# Patient Record
Sex: FEMALE | Race: WHITE | NOT HISPANIC OR LATINO | Employment: FULL TIME | URBAN - METROPOLITAN AREA
[De-identification: names, ages, dates, MRNs, and addresses within clinical notes are randomized per-mention and may not be internally consistent; named-entity substitution may affect disease eponyms.]

---

## 2017-04-17 ENCOUNTER — OFFICE VISIT (OUTPATIENT)
Dept: URGENT CARE | Facility: URGENT CARE | Age: 32
End: 2017-04-17
Payer: COMMERCIAL

## 2017-04-17 VITALS
HEART RATE: 85 BPM | SYSTOLIC BLOOD PRESSURE: 116 MMHG | OXYGEN SATURATION: 99 % | DIASTOLIC BLOOD PRESSURE: 76 MMHG | BODY MASS INDEX: 21.11 KG/M2 | TEMPERATURE: 98.8 F | WEIGHT: 123 LBS

## 2017-04-17 DIAGNOSIS — J45.20 MILD INTERMITTENT ASTHMA WITHOUT COMPLICATION: Primary | ICD-10-CM

## 2017-04-17 DIAGNOSIS — J45.20 MILD INTERMITTENT ASTHMA, UNCOMPLICATED: ICD-10-CM

## 2017-04-17 DIAGNOSIS — J06.9 VIRAL URI WITH COUGH: ICD-10-CM

## 2017-04-17 PROCEDURE — 99213 OFFICE O/P EST LOW 20 MIN: CPT | Performed by: FAMILY MEDICINE

## 2017-04-17 RX ORDER — ALBUTEROL SULFATE 0.83 MG/ML
1 SOLUTION RESPIRATORY (INHALATION) EVERY 6 HOURS PRN
Qty: 50 VIAL | Refills: 5 | Status: SHIPPED | OUTPATIENT
Start: 2017-04-17 | End: 2019-12-02

## 2017-04-17 RX ORDER — BUDESONIDE 0.5 MG/2ML
0.5 INHALANT ORAL DAILY
Qty: 1 BOX | Refills: 5 | Status: ON HOLD | OUTPATIENT
Start: 2017-04-17 | End: 2019-04-10

## 2017-04-17 RX ORDER — ALBUTEROL SULFATE 90 UG/1
2-4 AEROSOL, METERED RESPIRATORY (INHALATION) EVERY 4 HOURS PRN
Qty: 1 INHALER | Refills: 2 | Status: SHIPPED | OUTPATIENT
Start: 2017-04-17 | End: 2018-11-07

## 2017-04-17 NOTE — MR AVS SNAPSHOT
After Visit Summary   4/17/2017    Cuca Jung    MRN: [id number]           Patient Information     Date Of Birth          1985        Visit Information        Provider Department      4/17/2017 5:00 PM Giovanni Mir MD [facility]        Today's Diagnoses     Mild intermittent asthma without complication    -  1    Mild intermittent asthma, uncomplicated           Follow-ups after your visit        Who to contact     If you have questions or need follow up information about today's clinic visit or your schedule please contact [facility] directly at [phone].  Normal or non-critical lab and imaging results will be communicated to you by Survioshart, letter or phone within 4 business days after the clinic has received the results. If you do not hear from us within 7 days, please contact the clinic through Survios[employer] or phone. If you have a critical or abnormal lab result, we will notify you by phone as soon as possible.  Submit refill requests through [employer] or call your pharmacy and they will forward the refill request to us. Please allow 3 business days for your refill to be completed.          Additional Information About Your Visit        MyChart Information     [employer] gives you secure access to your electronic health record. If you see a primary care provider, you can also send messages to your care team and make appointments. If you have questions, please call your primary care clinic.  If you do not have a primary care provider, please call [phone] and they will assist you.        Care EveryWhere ID     This is your Care EveryWhere ID. This could be used by other organizations to access your [facility] medical records  [id number]        Your Vitals Were     Pulse Temperature Pulse Oximetry BMI (Body Mass Index)          85 98.8  F (37.1  C) (Oral) 99% 21.11 kg/m2         Blood Pressure from Last 3 Encounters:   04/17/17 116/76   09/27/16  118/71   08/11/16 111/68    Weight from Last 3 Encounters:   04/17/17 123 lb (55.8 kg)   09/27/16 121 lb (54.9 kg)   08/11/16 118 lb 12.8 oz (53.9 kg)              Today, you had the following     No orders found for display         Today's Medication Changes          These changes are accurate as of: 4/17/17  5:52 PM.  If you have any questions, ask your nurse or doctor.               These medicines have changed or have updated prescriptions.        Dose/Directions    * albuterol 108 (90 BASE) MCG/ACT Inhaler   Commonly known as:  PROAIR HFA/PROVENTIL HFA/VENTOLIN HFA   This may have changed:    - how much to take  - when to take this  - reasons to take this   Used for:  Mild intermittent asthma without complication   Changed by:  Giovanni Mir MD        Dose:  2-4 puff   Inhale 2-4 puffs into the lungs every 4 hours as needed for shortness of breath / dyspnea or wheezing Reported on 4/17/2017   Quantity:  1 Inhaler   Refills:  2       * albuterol (2.5 MG/3ML) 0.083% neb solution   This may have changed:  Another medication with the same name was changed. Make sure you understand how and when to take each.   Used for:  Mild intermittent asthma, uncomplicated   Changed by:  Giovanni Mir MD        Dose:  1 vial   Take 1 vial (2.5 mg) by nebulization every 6 hours as needed for shortness of breath / dyspnea or wheezing   Quantity:  50 vial   Refills:  5       * Notice:  This list has 2 medication(s) that are the same as other medications prescribed for you. Read the directions carefully, and ask your doctor or other care provider to review them with you.         Where to get your medicines      These medications were sent to [facility] [address] [place] [facility] [place] - [address] [facility]  [address] [facility] [address]     Phone:  [phone]     albuterol (2.5 MG/3ML) 0.083% neb solution    albuterol 108 (90 BASE) MCG/ACT Inhaler    budesonide 0.5 MG/2ML neb solution                 Primary Care Provider    None Specified       No primary provider on file.        Thank you!     Thank you for choosing [facility]  for your care. Our goal is always to provide you with excellent care. Hearing back from our patients is one way we can continue to improve our services. Please take a few minutes to complete the written survey that you may receive in the mail after your visit with us. Thank you!             Your Updated Medication List - Protect others around you: Learn how to safely use, store and throw away your medicines at www.disposemymeds.org.          This list is accurate as of: 4/17/17  5:52 PM.  Always use your most recent med list.                   Brand Name Dispense Instructions for use    * albuterol 108 (90 BASE) MCG/ACT Inhaler    PROAIR HFA/PROVENTIL HFA/VENTOLIN HFA    1 Inhaler    Inhale 2-4 puffs into the lungs every 4 hours as needed for shortness of breath / dyspnea or wheezing Reported on 4/17/2017       * albuterol (2.5 MG/3ML) 0.083% neb solution     50 vial    Take 1 vial (2.5 mg) by nebulization every 6 hours as needed for shortness of breath / dyspnea or wheezing       budesonide 0.5 MG/2ML neb solution    PULMICORT    1 Box    Take 2 mLs (0.5 mg) by nebulization daily       CALCIUM + D PO          drospirenone-ethinyl estradiol 3-0.02 MG per tablet    CAROLA    112 tablet    Take 1 tablet by mouth daily Active tablets only for prevention of menses       meclizine 25 MG tablet    ANTIVERT    30 tablet    Take 1 tablet (25 mg) by mouth every 6 hours as needed for dizziness       MELATONIN PO          Multi-vitamin Tabs tablet      Take 1 tablet by mouth daily       * Notice:  This list has 2 medication(s) that are the same as other medications prescribed for you. Read the directions carefully, and ask your doctor or other care provider to review them with you.

## 2017-04-17 NOTE — PROGRESS NOTES
Some of this note was populated by a medical assistant.    SUBJECTIVE:                                                    Cuca Jung is a 31 year old female who presents to clinic today for the following health issues:      RESPIRATORY SYMPTOMS      Duration: 3 days sore throat with strep exposure.     Description  nasal congestion, rhinorrhea, sore throat, facial pain/pressure, cough dry, wheezing, chills, fatigue/malaise, hoarse voice and myalgias    Severity: moderate    Accompanying signs and symptoms: None    History (predisposing factors):  strep exposure and asthma    Precipitating or alleviating factors: None    Therapies tried and outcome:  rest and fluids OTC NSAID     Has been using prn pulmicort and albuterol nebs or inhaler less than once per week on avg.     Problem list and histories reviewed & adjusted, as indicated.  Additional history: as documented    Patient Active Problem List   Diagnosis     Mild intermittent asthma, uncomplicated     Family history of malignant neoplasm of breast     Past Surgical History:   Procedure Laterality Date     PE TUBES  age 2     TONSILLECTOMY  age 23       Social History   Substance Use Topics     Smoking status: Never Smoker     Smokeless tobacco: Never Used     Alcohol use 0.0 oz/week     0 Standard drinks or equivalent per week     Family History   Problem Relation Age of Onset     DIABETES No family hx of      Glaucoma No family hx of      Macular Degeneration No family hx of      Hypertension Father      Breast Cancer Mother 40     Hypertension Mother      CEREBROVASCULAR DISEASE Mother      Thyroid Disease Maternal Uncle      Hypertension Paternal Grandmother      Hypertension Paternal Grandfather          Current Outpatient Prescriptions   Medication Sig Dispense Refill     Calcium Citrate-Vitamin D (CALCIUM + D PO)        MELATONIN PO        budesonide (PULMICORT) 0.5 MG/2ML nebulizer solution Take 2 mLs (0.5 mg) by nebulization daily 1 Box 11      albuterol (2.5 MG/3ML) 0.083% nebulizer solution Take 1 vial (2.5 mg) by nebulization every 6 hours as needed for shortness of breath / dyspnea or wheezing 50 vial 11     drospirenone-ethinyl estradiol (CAROLA) 3-0.02 MG per tablet Take 1 tablet by mouth daily Active tablets only for prevention of menses 112 tablet 3     multivitamin, therapeutic with minerals (MULTI-VITAMIN) TABS Take 1 tablet by mouth daily       meclizine (ANTIVERT) 25 MG tablet Take 1 tablet (25 mg) by mouth every 6 hours as needed for dizziness (Patient not taking: Reported on 4/17/2017) 30 tablet 1     albuterol (PROAIR HFA, PROVENTIL HFA, VENTOLIN HFA) 108 (90 BASE) MCG/ACT inhaler Inhale 2 puffs into the lungs every 6 hours Reported on 4/17/2017       Allergies   Allergen Reactions     Seasonal Allergies      Nickel Rash       Reviewed and updated as needed this visit by clinical staff  Tobacco  Allergies  Meds       Reviewed and updated as needed this visit by Provider         ROS:  Constitutional, HEENT, cardiovascular, pulmonary, gi and gu systems are negative, except as otherwise noted.    OBJECTIVE:                                                    /76 (BP Location: Left arm, Patient Position: Chair, Cuff Size: Adult Regular)  Pulse 85  Temp 98.8  F (37.1  C) (Oral)  Wt 123 lb (55.8 kg)  SpO2 99%  BMI 21.11 kg/m2  Body mass index is 21.11 kg/(m^2).  GENERAL: healthy, alert and no distress  NECK: no adenopathy, no asymmetry, masses, or scars and thyroid normal to palpation  RESP: noted mild expiratory wheeze with slightly decrease in air exchange.   CV: regular rate and rhythm, normal S1 S2, no S3 or S4, no murmur, click or rub, no peripheral edema and peripheral pulses strong  ABDOMEN: soft, nontender, no hepatosplenomegaly, no masses and bowel sounds normal  MS: no gross musculoskeletal defects noted, no edema          ASSESSMENT/PLAN:                                                        ICD-10-CM    1. Mild intermittent  asthma without complication J45.20 albuterol (PROAIR HFA/PROVENTIL HFA/VENTOLIN HFA) 108 (90 BASE) MCG/ACT Inhaler   2. Mild intermittent asthma, uncomplicated J45.20 albuterol (2.5 MG/3ML) 0.083% neb solution     budesonide (PULMICORT) 0.5 MG/2ML neb solution   3. Viral URI with cough J06.9     B97.89      Viral URI making her well controlled asthma worse.     PLAN  Patient educational/instructional material provided including reasons for follow-up   The patient indicates understanding of these issues and agrees with the plan.  Giovanni Mir MD    [facility]

## 2017-07-06 DIAGNOSIS — Z01.419 ENCOUNTER FOR GYNECOLOGICAL EXAMINATION WITHOUT ABNORMAL FINDING: ICD-10-CM

## 2017-07-06 RX ORDER — DROSPIRENONE AND ETHINYL ESTRADIOL 0.02-3(28)
1 KIT ORAL DAILY
Qty: 112 TABLET | Refills: 0 | Status: SHIPPED | OUTPATIENT
Start: 2017-07-06 | End: 2017-08-25

## 2017-07-06 NOTE — TELEPHONE ENCOUNTER
Pharmacy sent a refill request for OCPs. Pt is due for AE. TC to pt to get her scheduled. Scheduled with BE on 08/25/2017. Refill sent per protocol. Michaela Chaudhary RN

## 2017-08-25 ENCOUNTER — OFFICE VISIT (OUTPATIENT)
Dept: FAMILY MEDICINE | Facility: CLINIC | Age: 32
End: 2017-08-25
Payer: COMMERCIAL

## 2017-08-25 ENCOUNTER — RESULT FOLLOW UP (OUTPATIENT)
Dept: OBGYN | Facility: CLINIC | Age: 32
End: 2017-08-25

## 2017-08-25 ENCOUNTER — OFFICE VISIT (OUTPATIENT)
Dept: OBGYN | Facility: CLINIC | Age: 32
End: 2017-08-25
Payer: COMMERCIAL

## 2017-08-25 VITALS
BODY MASS INDEX: 21.51 KG/M2 | HEIGHT: 64 IN | SYSTOLIC BLOOD PRESSURE: 104 MMHG | DIASTOLIC BLOOD PRESSURE: 72 MMHG | HEART RATE: 77 BPM | WEIGHT: 126 LBS | OXYGEN SATURATION: 99 %

## 2017-08-25 VITALS
WEIGHT: 125 LBS | SYSTOLIC BLOOD PRESSURE: 101 MMHG | OXYGEN SATURATION: 100 % | HEART RATE: 69 BPM | DIASTOLIC BLOOD PRESSURE: 69 MMHG | TEMPERATURE: 98.5 F | BODY MASS INDEX: 21.46 KG/M2

## 2017-08-25 DIAGNOSIS — R20.2 PARESTHESIA: Primary | ICD-10-CM

## 2017-08-25 DIAGNOSIS — Z80.3 FAMILY HISTORY OF MALIGNANT NEOPLASM OF BREAST: ICD-10-CM

## 2017-08-25 DIAGNOSIS — Z01.419 ENCOUNTER FOR GYNECOLOGICAL EXAMINATION WITHOUT ABNORMAL FINDING: Primary | ICD-10-CM

## 2017-08-25 DIAGNOSIS — Z13.220 SCREENING FOR LIPOID DISORDERS: ICD-10-CM

## 2017-08-25 DIAGNOSIS — N87.0 DYSPLASIA OF CERVIX, LOW GRADE (CIN 1): ICD-10-CM

## 2017-08-25 LAB
BASOPHILS # BLD AUTO: 0 10E9/L (ref 0–0.2)
BASOPHILS NFR BLD AUTO: 0.1 %
DIFFERENTIAL METHOD BLD: NORMAL
EOSINOPHIL # BLD AUTO: 0.1 10E9/L (ref 0–0.7)
EOSINOPHIL NFR BLD AUTO: 1.4 %
ERYTHROCYTE [DISTWIDTH] IN BLOOD BY AUTOMATED COUNT: 12.3 % (ref 10–15)
FOLATE SERPL-MCNC: 64.5 NG/ML
HCT VFR BLD AUTO: 36.7 % (ref 35–47)
HGB BLD-MCNC: 12.2 G/DL (ref 11.7–15.7)
LYMPHOCYTES # BLD AUTO: 1.8 10E9/L (ref 0.8–5.3)
LYMPHOCYTES NFR BLD AUTO: 21.2 %
MCH RBC QN AUTO: 29 PG (ref 26.5–33)
MCHC RBC AUTO-ENTMCNC: 33.2 G/DL (ref 31.5–36.5)
MCV RBC AUTO: 87 FL (ref 78–100)
MONOCYTES # BLD AUTO: 0.8 10E9/L (ref 0–1.3)
MONOCYTES NFR BLD AUTO: 9.2 %
NEUTROPHILS # BLD AUTO: 5.7 10E9/L (ref 1.6–8.3)
NEUTROPHILS NFR BLD AUTO: 68.1 %
PLATELET # BLD AUTO: 270 10E9/L (ref 150–450)
RBC # BLD AUTO: 4.2 10E12/L (ref 3.8–5.2)
VIT B12 SERPL-MCNC: 380 PG/ML (ref 193–986)
WBC # BLD AUTO: 8.3 10E9/L (ref 4–11)

## 2017-08-25 PROCEDURE — 80053 COMPREHEN METABOLIC PANEL: CPT | Performed by: PHYSICIAN ASSISTANT

## 2017-08-25 PROCEDURE — 99395 PREV VISIT EST AGE 18-39: CPT | Performed by: OBSTETRICS & GYNECOLOGY

## 2017-08-25 PROCEDURE — 82746 ASSAY OF FOLIC ACID SERUM: CPT | Performed by: PHYSICIAN ASSISTANT

## 2017-08-25 PROCEDURE — 82306 VITAMIN D 25 HYDROXY: CPT | Performed by: PHYSICIAN ASSISTANT

## 2017-08-25 PROCEDURE — 82607 VITAMIN B-12: CPT | Performed by: PHYSICIAN ASSISTANT

## 2017-08-25 PROCEDURE — 36415 COLL VENOUS BLD VENIPUNCTURE: CPT | Performed by: PHYSICIAN ASSISTANT

## 2017-08-25 PROCEDURE — 85025 COMPLETE CBC W/AUTO DIFF WBC: CPT | Performed by: PHYSICIAN ASSISTANT

## 2017-08-25 PROCEDURE — G0124 SCREEN C/V THIN LAYER BY MD: HCPCS | Performed by: OBSTETRICS & GYNECOLOGY

## 2017-08-25 PROCEDURE — 99213 OFFICE O/P EST LOW 20 MIN: CPT | Performed by: PHYSICIAN ASSISTANT

## 2017-08-25 PROCEDURE — 87624 HPV HI-RISK TYP POOLED RSLT: CPT | Performed by: OBSTETRICS & GYNECOLOGY

## 2017-08-25 PROCEDURE — G0145 SCR C/V CYTO,THINLAYER,RESCR: HCPCS | Performed by: OBSTETRICS & GYNECOLOGY

## 2017-08-25 RX ORDER — DROSPIRENONE AND ETHINYL ESTRADIOL 0.02-3(28)
1 KIT ORAL DAILY
Qty: 112 TABLET | Refills: 3 | Status: SHIPPED | OUTPATIENT
Start: 2017-08-25 | End: 2017-10-13

## 2017-08-25 ASSESSMENT — ENCOUNTER SYMPTOMS
CHILLS: 0
HEADACHES: 0
DOUBLE VISION: 0
BLURRED VISION: 0
DIZZINESS: 0
FEVER: 0
FOCAL WEAKNESS: 0
ABDOMINAL PAIN: 0
SENSORY CHANGE: 1
NAUSEA: 0
TINGLING: 1
DIARRHEA: 0
SHORTNESS OF BREATH: 0
VOMITING: 0

## 2017-08-25 NOTE — PROGRESS NOTES
HPI    SUBJECTIVE:   Cuca Jung is a 31 year old female who presents to clinic today for the following health issues:      Numbness and tingling      Duration: on going    Description (location/character/radiation):hands, feet and chest    Intensity:  mild    Accompanying signs and symptoms: no    History (similar episodes/previous evaluation): None    Precipitating or alleviating factors: None    Therapies tried and outcome: None     Started about 1 yr ago, more frequent for past month. Tingling on all fingertips bilaterally. Feet also tingling but this is usually unilateral, sometimes same time as fingertips but also sometimes separate. Fingertips it lasts for several hours, for feet usually only a few minutes. Some reduced sensation in these areas also.  Denies vision changes, HA, neck pain, dizziness, N/V, focal weakness, presyncope, or syncope.    Mostly vegetarian diet, no hx anemia. Does take a multivitamin and a probiotic    Additional complaints: None    Chart Review:  No flowsheet data found.  No flowsheet data found.    Patient Active Problem List   Diagnosis     Mild intermittent asthma, uncomplicated     Family history of malignant neoplasm of breast     Past Surgical History:   Procedure Laterality Date     PE TUBES  age 2     TONSILLECTOMY  age 23     Family History   Problem Relation Age of Onset     Breast Cancer Mother 40     Hypertension Mother      CEREBROVASCULAR DISEASE Mother      Hypertension Father      Hypertension Paternal Grandfather      Hypertension Paternal Grandmother      Thyroid Disease Maternal Uncle      DIABETES No family hx of      Glaucoma No family hx of      Macular Degeneration No family hx of       Social History   Substance Use Topics     Smoking status: Never Smoker     Smokeless tobacco: Never Used     Alcohol use 0.0 oz/week     0 Standard drinks or equivalent per week        Problem list, Medication list, Allergies, Medical/Social/Surg hx reviewed in Crossbar, updated  as appropriate.      Review of Systems   Constitutional: Negative for chills and fever.   Eyes: Negative for blurred vision and double vision.   Respiratory: Negative for shortness of breath.    Cardiovascular: Negative for chest pain.   Gastrointestinal: Negative for abdominal pain, diarrhea, nausea and vomiting.   Skin: Negative for rash.   Neurological: Positive for tingling and sensory change. Negative for dizziness, focal weakness and headaches.   All other systems reviewed and are negative.        Physical Exam   Constitutional: She is oriented to person, place, and time and well-developed, well-nourished, and in no distress.   HENT:   Head: Normocephalic and atraumatic.   Right Ear: Tympanic membrane, external ear and ear canal normal.   Left Ear: Tympanic membrane, external ear and ear canal normal.   Cardiovascular: Normal rate, regular rhythm and normal heart sounds.    Pulmonary/Chest: Effort normal and breath sounds normal.   Musculoskeletal: Normal range of motion.   Neurological: She is alert and oriented to person, place, and time. She has normal motor skills, normal strength and intact cranial nerves. Gait normal.   Diminished sensation to fingertips   Skin: Skin is warm and dry.   Nursing note and vitals reviewed.    Vital Signs  /69  Pulse 69  Temp 98.5  F (36.9  C) (Oral)  Wt 125 lb (56.7 kg)  SpO2 100%  BMI 21.46 kg/m2   Body mass index is 21.46 kg/(m^2).    Diagnostic Test Results:  pending    ASSESSMENT/PLAN:                                                        ICD-10-CM    1. Paresthesia R20.2 Comprehensive metabolic panel     CBC with platelets differential     Vitamin B12     Folate     Vitamin D Deficiency     Unremarkable neuro exam. Labs pending. If labs normal, will refer to neurology.    I have discussed any lab or imaging results, the patient's diagnosis, and my plan of treatment with the patient and/or family. Patient is aware to come back in if with worsening symptoms or  if no relief despite treatment plan.  Patient voiced understanding and had no further questions.       Follow Up: Data Unavailable    RUBÉN Gore, PA-C  [facility]

## 2017-08-25 NOTE — LETTER
February 3, 2020      Cuca Jung  [address] [place] [id number]      Dear MsUsman Jung,    At [facility], your health and wellness is our primary concern. That is why we are following up on a positive high risk HPV test  from 12/02/19.  Your Provider had recommended that you have a Colposcopy  completed by 03/02/20. Our records do not show that this has been scheduled.    It is important to complete the follow up that your provider has suggested for you to ensure that there are no worsening changes which may, over time, develop into cancer.      Please call  [phone] to schedule an appointment for a Colposcopy (this cannot be scheduled through [facility])  at your earliest convenience. If you have questions or concerns, please call the clinic and we will be happy to assist you.    If you have completed the tests outside of [facility], please have the results forwarded to our office. We will update the chart for your primary Physician to review before your next annual physical.     Thank you for choosing [facility]!    Sincerely,      Your [facility] Care Team //[facility]

## 2017-08-25 NOTE — PROGRESS NOTES
"  SUBJECTIVE:   Cuca Jung is a 31 year old female who presents to clinic today for the following health issues:      Numbness and tingling      Duration: on going    Description (location/character/radiation):hands, feet and chest    Intensity:  mild    Accompanying signs and symptoms: no    History (similar episodes/previous evaluation): None    Precipitating or alleviating factors: None    Therapies tried and outcome: None     Started about 1 yr ago, more frequent for past month. Tingling on all fingertips bilaterally. Feet also tingling but this is usually unilateral, sometimes same time as fingertips but also sometimes separate. Fingertips it lats for several hours. Some reduced sensation.     Mostly vegetarian diet, no hx anemia. Does take a multivitamin and a probiotic    {additional problems for provider to add:930987}    Problem list and histories reviewed & adjusted, as indicated.  Additional history: {NONE - AS DOCUMENTED:107985::\"as documented\"}    {HIST REVIEW/ LINKS 2:871775}    Reviewed and updated as needed this visit by clinical staffTobacco  Allergies  Meds  Med Hx  Surg Hx  Fam Hx  Soc Hx      Reviewed and updated as needed this visit by Provider         {PROVIDER CHARTING PREFERENCE:814116}    "

## 2017-08-25 NOTE — NURSING NOTE
"Chief Complaint   Patient presents with     Numbness     On hands and feet       Initial /69  Pulse 69  Temp 98.5  F (36.9  C) (Oral)  Wt 125 lb (56.7 kg)  SpO2 100%  BMI 21.46 kg/m2 Estimated body mass index is 21.46 kg/(m^2) as calculated from the following:    Height as of 8/11/16: 5' 4\" (1.626 m).    Weight as of this encounter: 125 lb (56.7 kg).  Medication Reconciliation: complete   Susy Rothman MA      "

## 2017-08-25 NOTE — MR AVS SNAPSHOT
After Visit Summary   8/25/2017    Cuca Jung    MRN: [id number]           Patient Information     Date Of Birth          1985        Visit Information        Provider Department      8/25/2017 3:10 PM Pau Peter PA-C [facility]        Today's Diagnoses     Paresthesia    -  1       Follow-ups after your visit        Your next 10 appointments already scheduled     Aug 25, 2017  3:10 PM CDT   Office Visit with Pau Peter PA-C   [facility] ([facility])    [address] [place] [id number] [phone]           Bring a current list of meds and any records pertaining to this visit. For Physicals, please bring immunization records and any forms needing to be filled out. Please arrive 10 minutes early to complete paperwork.              Future tests that were ordered for you today     Open Future Orders        Priority Expected Expires Ordered    Lipid Profile Routine  2/23/2018 8/25/2017    *MA Screening Digital Bilateral Routine  8/25/2018 8/25/2017            Who to contact     If you have questions or need follow up information about today's clinic visit or your schedule please contact [facility] directly at [phone].  Normal or non-critical lab and imaging results will be communicated to you by MyChart, letter or phone within 4 business days after the clinic has received the results. If you do not hear from us within 7 days, please contact the clinic through SolidX Partnershart or phone. If you have a critical or abnormal lab result, we will notify you by phone as soon as possible.  Submit refill requests through [employer] or call your pharmacy and they will forward the refill request to us. Please allow 3 business days for your refill to be completed.          Additional Information About Your Visit        [employer]YepLike! Information     [employer] gives you secure access to your electronic  health record. If you see a primary care provider, you can also send messages to your care team and make appointments. If you have questions, please call your primary care clinic.  If you do not have a primary care provider, please call [phone] and they will assist you.        Care EveryWhere ID     This is your Care EveryWhere ID. This could be used by other organizations to access your [place] medical records  [id number]        Your Vitals Were     Pulse Temperature Pulse Oximetry BMI (Body Mass Index)          69 98.5  F (36.9  C) (Oral) 100% 21.46 kg/m2         Blood Pressure from Last 3 Encounters:   08/25/17 101/69   04/17/17 116/76   09/27/16 118/71    Weight from Last 3 Encounters:   08/25/17 125 lb (56.7 kg)   04/17/17 123 lb (55.8 kg)   09/27/16 121 lb (54.9 kg)              We Performed the Following     CBC with platelets differential     Comprehensive metabolic panel     Folate     Vitamin B12     Vitamin D Deficiency          Where to get your medicines      These medications were sent to [facility] [address] [place] [facility] [id number] [place] [address]  [id number] [place] [address]     Phone:  [phone]     drospirenone-ethinyl estradiol 3-0.02 MG per tablet          Primary Care Provider    None Specified       No primary provider on file.        Equal Access to Services     MARCELA OLIVEIRA AH: Radha Cerrato, waaxda luqadaha, qaybta kaalmada елена, jean-paul garber. So [facility] [phone].    ATENCIÓN: Si habla español, tiene a garcia disposición servicios gratuitos de asistencia lingüística. Llame al [phone].    We comply with applicable federal civil rights laws and [place] laws. We do not discriminate on the basis of race, color, national origin, age, disability sex, sexual orientation or gender identity.            Thank you!     Thank you for choosing [facility]  for your care. Our goal is always to provide you with  excellent care. Hearing back from our patients is one way we can continue to improve our services. Please take a few minutes to complete the written survey that you may receive in the mail after your visit with us. Thank you!             Your Updated Medication List - Protect others around you: Learn how to safely use, store and throw away your medicines at www.disposemymeds.org.          This list is accurate as of: 8/25/17  2:57 PM.  Always use your most recent med list.                   Brand Name Dispense Instructions for use Diagnosis    * albuterol 108 (90 BASE) MCG/ACT Inhaler    PROAIR HFA/PROVENTIL HFA/VENTOLIN HFA    1 Inhaler    Inhale 2-4 puffs into the lungs every 4 hours as needed for shortness of breath / dyspnea or wheezing Reported on 4/17/2017    Mild intermittent asthma without complication       * albuterol (2.5 MG/3ML) 0.083% neb solution     50 vial    Take 1 vial (2.5 mg) by nebulization every 6 hours as needed for shortness of breath / dyspnea or wheezing    Mild intermittent asthma, uncomplicated       budesonide 0.5 MG/2ML neb solution    PULMICORT    1 Box    Take 2 mLs (0.5 mg) by nebulization daily    Mild intermittent asthma, uncomplicated       CALCIUM + D PO           drospirenone-ethinyl estradiol 3-0.02 MG per tablet    CAROLA    112 tablet    Take 1 tablet by mouth daily Active tablets only for prevention of menses    Encounter for gynecological examination without abnormal finding       FIBER PO           MELATONIN PO           Multi-vitamin Tabs tablet      Take 1 tablet by mouth daily        PROBIOTIC DAILY PO           * Notice:  This list has 2 medication(s) that are the same as other medications prescribed for you. Read the directions carefully, and ask your doctor or other care provider to review them with you.

## 2017-08-25 NOTE — MR AVS SNAPSHOT
After Visit Summary   8/25/2017    Cuca Jung    MRN: [id number]           Patient Information     Date Of Birth          1985        Visit Information        Provider Department      8/25/2017 1:30 PM Karla Bae [place] [facility] [place]        Today's Diagnoses     Encounter for gynecological examination without abnormal finding    -  1    Family history of malignant neoplasm of breast        Screening for lipoid disorders           Follow-ups after your visit        Future tests that were ordered for you today     Open Future Orders        Priority Expected Expires Ordered    Lipid Profile Routine  2/23/2018 8/25/2017    *MA Screening Digital Bilateral Routine  8/25/2018 8/25/2017            Who to contact     If you have questions or need follow up information about today's clinic visit or your schedule please contact [facility] directly at [phone].  Normal or non-critical lab and imaging results will be communicated to you by MyChart, letter or phone within 4 business days after the clinic has received the results. If you do not hear from us within 7 days, please contact the clinic through Trineanhart or phone. If you have a critical or abnormal lab result, we will notify you by phone as soon as possible.  Submit refill requests through Medminder or call your pharmacy and they will forward the refill request to us. Please allow 3 business days for your refill to be completed.          Additional Information About Your Visit        MyChart Information     Medminder gives you secure access to your electronic health record. If you see a primary care provider, you can also send messages to your care team and make appointments. If you have questions, please call your primary care clinic.  If you do not have a primary care provider, please call [phone] and they will assist you.        Care EveryWhere ID     This is your Care EveryWhere ID. This could be used by  "other organizations to access your [place] medical records  [id number]        Your Vitals Were     Pulse Height Pulse Oximetry BMI (Body Mass Index)          77 5' 4\" (1.626 m) 99% 21.63 kg/m2         Blood Pressure from Last 3 Encounters:   08/25/17 101/69   08/25/17 104/72   04/17/17 116/76    Weight from Last 3 Encounters:   08/25/17 125 lb (56.7 kg)   08/25/17 126 lb (57.2 kg)   04/17/17 123 lb (55.8 kg)              We Performed the Following     HPV High Risk Types DNA Cervical     Pap imaged thin layer screen with HPV - recommended age 30 - 65 years (select HPV order below)          Where to get your medicines      These medications were sent to [facility] [place] [facility] [address] [facility]  [address] [facility] [address]     Phone:  [phone]     drospirenone-ethinyl estradiol 3-0.02 MG per tablet          Primary Care Provider    None Specified       No primary provider on file.        Equal Access to Services     MARCELA OLIVEIRA : Hadii esteban baigo Sobryce, waaxda luqadaha, qaybta kaalmada adesh[place], jean-paul casillas . So [facility] [phone].    ATENCIÓN: Si habla español, tiene a garcia disposición servicios gratuitos de asistencia lingüística. Llame al [phone].    We comply with applicable federal civil rights laws and [place] laws. We do not discriminate on the basis of race, color, national origin, age, disability sex, sexual orientation or gender identity.            Thank you!     Thank you for choosing [facility]  for your care. Our goal is always to provide you with excellent care. Hearing back from our patients is one way we can continue to improve our services. Please take a few minutes to complete the written survey that you may receive in the mail after your visit with us. Thank you!             Your Updated Medication List - Protect others around you: Learn how to safely use, store and throw away your medicines at " www.disposemymeds.org.          This list is accurate as of: 8/25/17 11:59 PM.  Always use your most recent med list.                   Brand Name Dispense Instructions for use Diagnosis    * albuterol 108 (90 BASE) MCG/ACT Inhaler    PROAIR HFA/PROVENTIL HFA/VENTOLIN HFA    1 Inhaler    Inhale 2-4 puffs into the lungs every 4 hours as needed for shortness of breath / dyspnea or wheezing Reported on 4/17/2017    Mild intermittent asthma without complication       * albuterol (2.5 MG/3ML) 0.083% neb solution     50 vial    Take 1 vial (2.5 mg) by nebulization every 6 hours as needed for shortness of breath / dyspnea or wheezing    Mild intermittent asthma, uncomplicated       budesonide 0.5 MG/2ML neb solution    PULMICORT    1 Box    Take 2 mLs (0.5 mg) by nebulization daily    Mild intermittent asthma, uncomplicated       CALCIUM + D PO           drospirenone-ethinyl estradiol 3-0.02 MG per tablet    CAROLA    112 tablet    Take 1 tablet by mouth daily Active tablets only for prevention of menses    Encounter for gynecological examination without abnormal finding       FIBER PO           MELATONIN PO           Multi-vitamin Tabs tablet      Take 1 tablet by mouth daily        PROBIOTIC DAILY PO           * Notice:  This list has 2 medication(s) that are the same as other medications prescribed for you. Read the directions carefully, and ask your doctor or other care provider to review them with you.

## 2017-08-25 NOTE — LETTER
October 2, 2018      Cuca Jung  [address]  [place] [address]    Dear ,      At [facility], your health and wellness is our primary concern. That is why we are following up on a colposcopy from 10/13/17, which was reported as atypical squamous epithelium of undetermined significance. Your provider had recommended that you have a Pap smear and HPV test completed by 10/13/18. Our records do not show that this has been scheduled.    It is important to complete the follow up that your provider has suggested for you to ensure that there are no worsening changes which may, over time, develop into cancer.      Please contact our office at  [phone] to schedule an appointment for a Pap smear and HPV test at your earliest convenience. If you have questions or concerns, please call the clinic and we will be happy to assist you.    If you have completed the tests outside of [facility], please have the results forwarded to our office. We will update the chart for your primary Physician to review before your next annual physical.     Thank you for choosing [facility]!    Sincerely,      JEWELL HANCOCK MD/catalina

## 2017-08-25 NOTE — PROGRESS NOTES
Cuca is a 31 year old  female who presents for annual exam.     Menses are rare  No LMP recorded.. Using oral contraceptives for contraception.  She is not currently considering pregnancy.  Besides routine health maintenance,  she would like to discuss some numbness of hands and feet at times.   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   Dating the same evette but since she has been  really not wanting more.  GYNECOLOGIC HISTORY:  Menarche:   Cuca is sexually active with 1male partner(s) and is currently in monogamous relationship.    History sexually transmitted infections:No STD history  STI testing offered?  Declined  BELLA exposure: Unknown  History of abnormal Pap smear: NO - age 30- 65 PAP every 3 years recommended  Family history of breast CA: Yes (Please explain): mom  Family history of uterine/ovarian CA: No    Family history of colon CA: No    HEALTH MAINTENANCE:  Cholesterol: (  Cholesterol   Date Value Ref Range Status   2016 233 (H) <200 mg/dL Final     Comment:     Desirable:       <200 mg/dl    History of abnormal lipids: Yes  Mammo: na . History of abnormal Mammo: Not applicable.  Regular Self Breast Exams: Yes  Calcium/Vitamin D intake: source:  dairy, dietary supplement(s) Adequate? Yes  TSH: (  TSH   Date Value Ref Range Status   2016 2.65 0.40 - 4.00 mU/L Final    )  Pap; (No results found for: PAP )    HISTORY:  Obstetric History       T0      L0     SAB0   TAB0   Ectopic0   Multiple0   Live Births0         Past Medical History:   Diagnosis Date     Borderline high cholesterol      Mild intermittent asthma, uncomplicated     exercise or smoke brings it on     Past Surgical History:   Procedure Laterality Date     PE TUBES  age 2     TONSILLECTOMY  age 23     Family History   Problem Relation Age of Onset     Breast Cancer Mother 40     Hypertension Mother      CEREBROVASCULAR DISEASE Mother      Hypertension Father      Hypertension  Paternal Grandfather      Hypertension Paternal Grandmother      Thyroid Disease Maternal Uncle      DIABETES No family hx of      Glaucoma No family hx of      Macular Degeneration No family hx of      Social History     Social History     Marital status: Single     Spouse name: N/A     Number of children: 0     Years of education: N/A     Occupational History     LPN at [employer]     just finished RN school     Social History Main Topics     Smoking status: Never Smoker     Smokeless tobacco: Never Used     Alcohol use 0.0 oz/week     0 Standard drinks or equivalent per week     Drug use: No     Sexual activity: Yes     Partners: Male     Birth control/ protection: Pill, Condom     Other Topics Concern     None     Social History Narrative       Current Outpatient Prescriptions:      Probiotic Product (PROBIOTIC DAILY PO), , Disp: , Rfl:      FIBER PO, , Disp: , Rfl:      drospirenone-ethinyl estradiol (CAROLA) 3-0.02 MG per tablet, Take 1 tablet by mouth daily Active tablets only for prevention of menses, Disp: 112 tablet, Rfl: 3     albuterol (PROAIR HFA/PROVENTIL HFA/VENTOLIN HFA) 108 (90 BASE) MCG/ACT Inhaler, Inhale 2-4 puffs into the lungs every 4 hours as needed for shortness of breath / dyspnea or wheezing Reported on 4/17/2017, Disp: 1 Inhaler, Rfl: 2     albuterol (2.5 MG/3ML) 0.083% neb solution, Take 1 vial (2.5 mg) by nebulization every 6 hours as needed for shortness of breath / dyspnea or wheezing, Disp: 50 vial, Rfl: 5     budesonide (PULMICORT) 0.5 MG/2ML neb solution, Take 2 mLs (0.5 mg) by nebulization daily, Disp: 1 Box, Rfl: 5     Calcium Citrate-Vitamin D (CALCIUM + D PO), , Disp: , Rfl:      MELATONIN PO, , Disp: , Rfl:      multivitamin, therapeutic with minerals (MULTI-VITAMIN) TABS, Take 1 tablet by mouth daily, Disp: , Rfl:      Allergies   Allergen Reactions     Seasonal Allergies      Nickel Rash       Past medical, surgical, social and family  "history were reviewed and updated in EPIC.      EXAM:  /72  Pulse 77  Ht 5' 4\" (1.626 m)  Wt 126 lb (57.2 kg)  SpO2 99%  BMI 21.63 kg/m2   BMI: Body mass index is 21.63 kg/(m^2).  Constitutional: healthy, alert and no distress  Head: Normocephalic. No masses, lesions, tenderness or abnormalities  Neck: Neck supple. Trachea midline. No adenopathy. Thyroid symmetric, normal size.   Cardiovascular: RRR.   Respiratory: Negative.   Breast: Breasts reveal mild symmetric fibrocystic densities, but there are no dominant, discrete, fixed or suspicious masses found.  Gastrointestinal: Abdomen soft, non-tender, non-distended. No masses, organomegaly.  :  Vulva:  No external lesions, normal female hair distribution, no inguinal adenopathy.    Urethra:  Midline, non-tender, well supported, no discharge  Vagina:  Moist, pink, no abnormal discharge, no lesions  Uterus:  Normal size , non-tender, freely mobile  Ovaries:  No masses appreciated, non-tender, mobile  Rectal Exam: deferred  Musculoskeletal: extremities normal  Skin: no suspicious lesions or rashes  Psychiatric: Affect appropriate, cooperative,mentation appears normal.     COUNSELING:   Reviewed preventive health counseling, as reflected in patient instructions       Contraception       Safe sex practices/STD prevention   reports that she has never smoked. She has never used smokeless tobacco.    Body mass index is 21.63 kg/(m^2).      FRAX Risk Assessment    ASSESSMENT:  31 year old female with satisfactory annual exam  (Z01.419) Encounter for gynecological examination without abnormal finding  (primary encounter diagnosis)  Comment: OCP  Plan: HPV High Risk Types DNA Cervical, Pap imaged         thin layer screen with HPV - recommended age 30        - 65 years (select HPV order below),         drospirenone-ethinyl estradiol (CAROLA) 3-0.02 MG         per tablet        Refill of OCP done with continuous use.     Discussed sending pap smear for HPV typing as " well and that if both pap and HPV are negative, then can have q5 year pap smears.      (Z80.3) Family history of malignant neoplasm of breast  Comment: mother age 40  Plan: *MA Screening Digital Bilateral         Still has not done her mammogram and will get this done.    (Z13.220) Screening for lipoid disorders  Comment: elevated last year  Plan: Lipid Profile        RTC fasting, check this yearly.      Karla Bae MD

## 2017-08-26 LAB
ALBUMIN SERPL-MCNC: 3.4 G/DL (ref 3.4–5)
ALP SERPL-CCNC: 43 U/L (ref 40–150)
ALT SERPL W P-5'-P-CCNC: 15 U/L (ref 0–50)
ANION GAP SERPL CALCULATED.3IONS-SCNC: 7 MMOL/L (ref 3–14)
AST SERPL W P-5'-P-CCNC: 14 U/L (ref 0–45)
BILIRUB SERPL-MCNC: 0.4 MG/DL (ref 0.2–1.3)
BUN SERPL-MCNC: 10 MG/DL (ref 7–30)
CALCIUM SERPL-MCNC: 8.9 MG/DL (ref 8.5–10.1)
CHLORIDE SERPL-SCNC: 107 MMOL/L (ref 94–109)
CO2 SERPL-SCNC: 26 MMOL/L (ref 20–32)
CREAT SERPL-MCNC: 0.74 MG/DL (ref 0.52–1.04)
GFR SERPL CREATININE-BSD FRML MDRD: >90 ML/MIN/1.7M2
GLUCOSE SERPL-MCNC: 85 MG/DL (ref 70–99)
POTASSIUM SERPL-SCNC: 3.8 MMOL/L (ref 3.4–5.3)
PROT SERPL-MCNC: 7.3 G/DL (ref 6.8–8.8)
SODIUM SERPL-SCNC: 140 MMOL/L (ref 133–144)

## 2017-08-27 LAB — DEPRECATED CALCIDIOL+CALCIFEROL SERPL-MC: 52 UG/L (ref 20–75)

## 2017-08-30 LAB
COPATH REPORT: ABNORMAL
PAP: ABNORMAL

## 2017-08-31 ENCOUNTER — RADIANT APPOINTMENT (OUTPATIENT)
Dept: MAMMOGRAPHY | Facility: CLINIC | Age: 32
End: 2017-08-31
Attending: OBSTETRICS & GYNECOLOGY
Payer: COMMERCIAL

## 2017-08-31 DIAGNOSIS — Z12.31 VISIT FOR SCREENING MAMMOGRAM: ICD-10-CM

## 2017-08-31 DIAGNOSIS — Z13.220 SCREENING FOR LIPOID DISORDERS: ICD-10-CM

## 2017-08-31 LAB
CHOLEST SERPL-MCNC: 265 MG/DL
HDLC SERPL-MCNC: 99 MG/DL
LDLC SERPL CALC-MCNC: 130 MG/DL
NONHDLC SERPL-MCNC: 166 MG/DL
TRIGL SERPL-MCNC: 181 MG/DL

## 2017-08-31 PROCEDURE — 36415 COLL VENOUS BLD VENIPUNCTURE: CPT | Performed by: OBSTETRICS & GYNECOLOGY

## 2017-08-31 PROCEDURE — 80061 LIPID PANEL: CPT | Performed by: OBSTETRICS & GYNECOLOGY

## 2017-08-31 PROCEDURE — G0202 SCR MAMMO BI INCL CAD: HCPCS

## 2017-09-01 PROBLEM — R87.610 ASCUS WITH POSITIVE HIGH RISK HPV CERVICAL: Status: ACTIVE | Noted: 2017-08-25

## 2017-09-01 PROBLEM — R87.612 PAPANICOLAOU SMEAR OF CERVIX WITH LOW GRADE SQUAMOUS INTRAEPITHELIAL LESION (LGSIL): Status: ACTIVE | Noted: 2017-08-25

## 2017-09-01 PROBLEM — R87.810 ASCUS WITH POSITIVE HIGH RISK HPV CERVICAL: Status: ACTIVE | Noted: 2017-08-25

## 2017-09-01 LAB
FINAL DIAGNOSIS: ABNORMAL
HPV HR 12 DNA CVX QL NAA+PROBE: POSITIVE
HPV16 DNA SPEC QL NAA+PROBE: NEGATIVE
HPV18 DNA SPEC QL NAA+PROBE: NEGATIVE
SPECIMEN DESCRIPTION: ABNORMAL

## 2017-09-01 NOTE — PROGRESS NOTES
7/06 LSIL pap -- [place] Bx: MERRY 1  5/07 NIL pap  5/08 LSIL pap -- 6/08 [place] ECC: neg  1/09 ASCUS pap, + HR HPV (type unknown)   7/09, 9/11, 1/13, 7/14 - all NIL paps  8/25/17 ASCUS pap, + HR HPV (not 16/18). Plan colp due by 11/25/17.  09/05/17: I called the pt and left a message for the pt to call me back. Pt returned the call and was informed of the results and given the recommendation for a [place] due by 11/25/17. (sk)  10/13/17: [place] Bx atypical squamous epithelium of undetermined significance. Plan cotest in 1 year.Pt was advised. (sk)  10/2/18 [facility] reminder letter sent ([facility])  11/07/18: Ascus pap,+ HR HPV (not 16 or 18) result. Plan cotest in 1 year per provider. [facility] result letter sent to the pt with the results and recommendations. Pt viewed [facility] message. (sk)  12/2/19 NIL, +HR HPV, not 16/18. Plan [place] by 3/2/20 per provider visit notes. (LN)  12/09/19:Msg left to call back. (sk)  12/10/19:Pt was advised. (sk)  02/03/20 [place] reminder letter sent. ([facility])  05/06/20 3mo colp not done, chart and tracking updated for 6mo colp/pap. ([facility])

## 2017-10-13 ENCOUNTER — OFFICE VISIT (OUTPATIENT)
Dept: OBGYN | Facility: CLINIC | Age: 32
End: 2017-10-13
Payer: COMMERCIAL

## 2017-10-13 VITALS
TEMPERATURE: 98.5 F | DIASTOLIC BLOOD PRESSURE: 64 MMHG | BODY MASS INDEX: 21.63 KG/M2 | HEART RATE: 83 BPM | OXYGEN SATURATION: 97 % | WEIGHT: 126.7 LBS | HEIGHT: 64 IN | SYSTOLIC BLOOD PRESSURE: 112 MMHG

## 2017-10-13 DIAGNOSIS — R87.810 CERVICAL HIGH RISK HUMAN PAPILLOMAVIRUS (HPV) DNA TEST POSITIVE: Primary | ICD-10-CM

## 2017-10-13 LAB — BETA HCG QUAL IFA URINE: NEGATIVE

## 2017-10-13 PROCEDURE — 57455 BIOPSY OF CERVIX W/SCOPE: CPT | Performed by: OBSTETRICS & GYNECOLOGY

## 2017-10-13 PROCEDURE — 84703 CHORIONIC GONADOTROPIN ASSAY: CPT | Performed by: OBSTETRICS & GYNECOLOGY

## 2017-10-13 PROCEDURE — 88305 TISSUE EXAM BY PATHOLOGIST: CPT | Performed by: OBSTETRICS & GYNECOLOGY

## 2017-10-13 PROCEDURE — 88342 IMHCHEM/IMCYTCHM 1ST ANTB: CPT | Performed by: OBSTETRICS & GYNECOLOGY

## 2017-10-13 ASSESSMENT — ANXIETY QUESTIONNAIRES
5. BEING SO RESTLESS THAT IT IS HARD TO SIT STILL: NOT AT ALL
1. FEELING NERVOUS, ANXIOUS, OR ON EDGE: SEVERAL DAYS
GAD7 TOTAL SCORE: 8
7. FEELING AFRAID AS IF SOMETHING AWFUL MIGHT HAPPEN: MORE THAN HALF THE DAYS
6. BECOMING EASILY ANNOYED OR IRRITABLE: SEVERAL DAYS
3. WORRYING TOO MUCH ABOUT DIFFERENT THINGS: SEVERAL DAYS
2. NOT BEING ABLE TO STOP OR CONTROL WORRYING: SEVERAL DAYS

## 2017-10-13 ASSESSMENT — PATIENT HEALTH QUESTIONNAIRE - PHQ9
5. POOR APPETITE OR OVEREATING: MORE THAN HALF THE DAYS
SUM OF ALL RESPONSES TO PHQ QUESTIONS 1-9: 4

## 2017-10-13 NOTE — MR AVS SNAPSHOT
After Visit Summary   10/13/2017    Cuca Jung    MRN: [id number]           Patient Information     Date Of Birth          1985        Visit Information        Provider Department      10/13/2017 10:30 AM Karla Bae MD; [facility]        Today's Diagnoses     Pregnancy examination or test, pregnancy unconfirmed    -  1      Care Instructions    Colposcopy Post-Procedure Patient Instructions      You may experience any of the following for a couple of days following colposcopy:    Mild cramping    Vaginal bleeding     Vaginal discharge that looks black and clumpy    Please call your healthcare provider if you have any of the following symptoms:    Fever--Temperature greater than 100 degrees    Cramping after 48 hours    Bleeding heavier than a normal period in the first 24-48 hours    If you are bleeding and soaking more than one pad an hour    Or any other worrisome problems.    We recommend that:    You use pads, not tampons for the bleeding.    You may resume sexual activity in 2-3 days, or after bleeding stops.    You may use Tylenol or ibuprofen (Motrin or Advil) for any discomfort.      [facility] - OB/GYN : [phone]          Follow-ups after your visit        Who to contact     If you have questions or need follow up information about today's clinic visit or your schedule please contact [facility] [place] at [phone].  Normal or non-critical lab and imaging results will be communicated to you by MyChart, letter or phone within 4 business days after the clinic has received the results. If you do not hear from us within 7 days, please contact the clinic through MyChart or phone. If you have a critical or abnormal lab result, we will notify you by phone as soon as possible.  Submit refill requests through StudySoup or call your pharmacy and they will forward the refill request to us. Please allow 3 business days  "for your refill to be completed.          Additional Information About Your Visit        MyChart Information     kingskyhart gives you secure access to your electronic health record. If you see a primary care provider, you can also send messages to your care team and make appointments. If you have questions, please call your primary care clinic.  If you do not have a primary care provider, please call [phone] and they will assist you.        Care EveryWhere ID     This is your Care EveryWhere ID. This could be used by other organizations to access your [place] medical records  [id number]        Your Vitals Were     Pulse Temperature Height Pulse Oximetry BMI (Body Mass Index)       83 98.5  F (36.9  C) (Oral) 5' 4\" (1.626 m) 97% 21.75 kg/m2        Blood Pressure from Last 3 Encounters:   10/13/17 112/64   08/25/17 101/69   08/25/17 104/72    Weight from Last 3 Encounters:   10/13/17 126 lb 11.2 oz (57.5 kg)   08/25/17 125 lb (56.7 kg)   08/25/17 126 lb (57.2 kg)              We Performed the Following     Beta HCG qual IFA urine        Primary Care Provider    None Specified       No primary provider on file.        Equal Access to Services     MARCELA OLIVEIRA : Hadii esteban Cerrato, wawendyda christiano, qaybta kaalmada adecamilayada, jean-paul casillas . So [facility] [phone].    ATENCIÓN: Si habla español, tiene a garcia disposición servicios gratuitos de asistencia lingüística. Llame al [phone].    We comply with applicable federal civil rights laws and [place] laws. We do not discriminate on the basis of race, color, national origin, age, disability, sex, sexual orientation, or gender identity.            Thank you!     Thank you for [place] [facility]  [place]. Our goal is always to provide you with excellent care. Hearing back from our patients is one way we can continue to improve our services. Please take a few minutes to complete the written survey that you " may receive in the mail after your visit with us. Thank you!             Your Updated Medication List - Protect others around you: Learn how to safely use, store and throw away your medicines at www.disposemymeds.org.          This list is accurate as of: 10/13/17 11:13 AM.  Always use your most recent med list.                   Brand Name Dispense Instructions for use Diagnosis    * albuterol 108 (90 BASE) MCG/ACT Inhaler    PROAIR HFA/PROVENTIL HFA/VENTOLIN HFA    1 Inhaler    Inhale 2-4 puffs into the lungs every 4 hours as needed for shortness of breath / dyspnea or wheezing Reported on 4/17/2017    Mild intermittent asthma without complication       * albuterol (2.5 MG/3ML) 0.083% neb solution     50 vial    Take 1 vial (2.5 mg) by nebulization every 6 hours as needed for shortness of breath / dyspnea or wheezing    Mild intermittent asthma, uncomplicated       budesonide 0.5 MG/2ML neb solution    PULMICORT    1 Box    Take 2 mLs (0.5 mg) by nebulization daily    Mild intermittent asthma, uncomplicated       CALCIUM + D PO           FIBER PO           LORYNA PO           MELATONIN PO           Multi-vitamin Tabs tablet      Take 1 tablet by mouth daily        PHENERGAN 25 MG tablet   Generic drug:  promethazine     20    1 TABLET EVERY 4 TO 6 HOURS AS NEEDED FOR NAUSEA    Food poisoning, unspecified       PROBIOTIC DAILY PO           rifampin 300 MG capsule    RIFADIN    8    2 CAPSULES PO BID x 2 days    Contact with or exposure to other communicable diseases(V01.89)       * Notice:  This list has 2 medication(s) that are the same as other medications prescribed for you. Read the directions carefully, and ask your doctor or other care provider to review them with you.

## 2017-10-13 NOTE — PROGRESS NOTES
Cuca Jung is a 31 year old year old female  who presents for initial colposcopy, referred. Pap smear 2 months ago showed: ASCUS, hpv other hr. The prior pap showed normal.     No LMP recorded.  UPT today is negative  Patient does not smoke  Type of contraception: oral contraceptive  Age at first sexual intercourse: 15  Number of sexual partners (lifetime): 6+  Past GYN history: HPV  Prior cervical/vaginal disease: MERRY 1  Prior cervical treatment: no treatment.    PROCEDURE:  Before the procedure, it was ensured that the patient was educated regarding the nature of her findings to date, the implications, and what was to be done. She has been made aware of the role of HPV, the natural history of infection, ways to minimize her future risk, the effect of HPV on the cervix, and treatment options available should they be indicated. The details of the   colposcopic procedure were reviewed. All questions were answered before proceeding, and informed consent was therefore obtained.  Speculum placed in vagina and excellent visualization of cervix   acheived, cervix swabbed x 3 with acetic acid solution.    FINDINGS:  Cervix: acetowhite area from 12:00 to 9:00 and HPV effect at 3:00  Please refer to images section for details.  Pap repeated?: No  SCJ seen?: yes   ECC done?: No  Lugol's solution used?: Yes all stains but above  Satisfactory examination?: yes  Small cervical biopsy done at 3 o'clock and monsels used for hemostasis. EBL minimal, tolerated well.    ASSESSMENT: HPV related changes.  PLAN: specimens labelled and sent to Pathology, will base further treatment on Pathology findings, treatment options discussed with patient and post biopsy instructions given to patient.  Discussed if biopsy is MERRY 1 or normal, plan repeat pap with HPV one year.      Karla Bae MD

## 2017-10-14 ASSESSMENT — ANXIETY QUESTIONNAIRES: GAD7 TOTAL SCORE: 8

## 2017-10-18 LAB — COPATH REPORT: NORMAL

## 2018-04-08 ENCOUNTER — OFFICE VISIT (OUTPATIENT)
Dept: URGENT CARE | Facility: URGENT CARE | Age: 33
End: 2018-04-08
Payer: COMMERCIAL

## 2018-04-08 VITALS
DIASTOLIC BLOOD PRESSURE: 75 MMHG | WEIGHT: 130 LBS | TEMPERATURE: 98.4 F | SYSTOLIC BLOOD PRESSURE: 116 MMHG | OXYGEN SATURATION: 99 % | BODY MASS INDEX: 22.31 KG/M2 | HEART RATE: 73 BPM

## 2018-04-08 DIAGNOSIS — S76.911A MUSCLE STRAIN OF THIGH, RIGHT, INITIAL ENCOUNTER: Primary | ICD-10-CM

## 2018-04-08 DIAGNOSIS — S76.311A HAMSTRING MUSCLE STRAIN, RIGHT, INITIAL ENCOUNTER: ICD-10-CM

## 2018-04-08 PROCEDURE — 99213 OFFICE O/P EST LOW 20 MIN: CPT | Performed by: FAMILY MEDICINE

## 2018-04-08 ASSESSMENT — PAIN SCALES - GENERAL: PAINLEVEL: MILD PAIN (3)

## 2018-04-08 NOTE — PROGRESS NOTES
SUBJECTIVE:  Chief Complaint   Patient presents with     Muscle Pain     r hamstring     Cuca Jung is a 32 year old female who presents with a chief complaint of right thigh pain and tenderness.  Symptoms began 1 day(s) ago, are moderate and sudden onset, still present and constant  Context:while running she developed sudden right thigh posterior pain that gradually worsened in the hours after presentation     Pain exacerbated by walking, running, weight-bearing and going up on her toes.   Relieved by rest.  She treated it initially with Tylenol and Ibuprofen. This is the first time this type of injury has occurred to this patient.     Past Medical History:   Diagnosis Date     ASCUS with positive high risk HPV cervical 1/15/09, 08/25/2017    type unknown; not 16/18     Borderline high cholesterol      History of colposcopy 7/19/2006, 06/10/2008    2006 - MERRY 1, 2008 - ECC: neg     Mild intermittent asthma, uncomplicated     exercise or smoke brings it on     NO ACTIVE PROBLEMS      Papanicolaou smear of cervix with low grade squamous intraepithelial lesion (LGSIL) 7/12/2006, 05/13/2008       ALLERGIES:  Seasonal allergies; Staples; Adhesive tape; and Nickel      Current Outpatient Prescriptions on File Prior to Visit:  Drospirenone-Ethinyl Estradiol (LORYNA PO)    Probiotic Product (PROBIOTIC DAILY PO)    FIBER PO    albuterol (PROAIR HFA/PROVENTIL HFA/VENTOLIN HFA) 108 (90 BASE) MCG/ACT Inhaler Inhale 2-4 puffs into the lungs every 4 hours as needed for shortness of breath / dyspnea or wheezing Reported on 4/17/2017   albuterol (2.5 MG/3ML) 0.083% neb solution Take 1 vial (2.5 mg) by nebulization every 6 hours as needed for shortness of breath / dyspnea or wheezing   budesonide (PULMICORT) 0.5 MG/2ML neb solution Take 2 mLs (0.5 mg) by nebulization daily   Calcium Citrate-Vitamin D (CALCIUM + D PO)    multivitamin, therapeutic with minerals (MULTI-VITAMIN) TABS Take 1 tablet by mouth daily   MELATONIN PO       No current facility-administered medications on file prior to visit.     Social History   Substance Use Topics     Smoking status: Never Smoker     Smokeless tobacco: Never Used     Alcohol use 0.0 oz/week     0 Standard drinks or equivalent per week      Comment: once per month       Family History   Problem Relation Age of Onset     Breast Cancer Mother 40     Mastectomy in Early 40s     Hypertension Mother      CEREBROVASCULAR DISEASE Mother      Hypertension Father      Hypertension Paternal Grandfather      Hypertension Paternal Grandmother      Thyroid Disease Maternal Uncle      DIABETES No family hx of      Glaucoma No family hx of      Macular Degeneration No family hx of      Lipids Father      Neurologic Disorder Paternal Grandmother      Parkinson's     Neurologic Disorder Other      MS       ROS:  CONSTITUTIONAL:NEGATIVE for fever, chills,    INTEGUMENTARY/SKIN: NEGATIVE for worrisome rashes,    EYES: NEGATIVE for vision changes or irritation  ENT/MOUTH: NEGATIVE for ear, mouth and throat problems  RESP:NEGATIVE for significant cough or SOB  GI: NEGATIVE for nausea, abdominal pain  or change in bowel habits    EXAM:   /75 (BP Location: Left arm, Patient Position: Chair, Cuff Size: Adult Regular)  Pulse 73  Temp 98.4  F (36.9  C) (Oral)  Wt 130 lb (59 kg)  SpO2 99%  BMI 22.31 kg/m2  M/S Exam:right thigh tenderness to palpation distal posterior thigh, pain with standing/ walking on her toes and purple discoloration distal posterior thigh  GENERAL APPEARANCE: alert, mild distress and cooperative  EXTREMITIES: peripheral pulses normal  SKIN: no suspicious lesions or rashes  NEURO: Normal strength and tone, sensory exam grossly normal, mentation intact and speech normal    X-RAY was not done.    ASSESSMENT:  Muscle strain of thigh, right, initial encounter     Hamstring muscle strain, right, initial encounter          acetaminophen and/or ibuprofen for pain  Warm packs to the site of pain  Use  cane   if needed for ambulating    Note for work

## 2018-04-08 NOTE — PATIENT INSTRUCTIONS
Muscle Strain in the Extremities  A muscle strain is a stretching and tearing of muscle fibers. This causes pain, especially when you move that muscle. There may also be some swelling and bruising.  Home care    Keep the hurt area raised to reduce pain and swelling. This is especially important during the first 48 hours.    Apply an ice pack over the injured area for 15 to 20 minutes every 3 to 6 hours. You should do this for the first 24 to 48 hours. You can make an ice pack by filling a plastic bag that seals at the top with ice cubes and then wrapping it with a thin towel. Be careful not to injure your skin with the ice treatments. Ice should never be applied directly to skin. Continue the use of ice packs for relief of pain and swelling as needed. After 48 hours, apply heat (warm shower or warm bath) for 15 to 20 minutes several times a day, or alternate ice and heat.    You may use over-the-counter pain medicine to control pain, unless another medicine was prescribed. If you have chronic liver or kidney disease or ever had a stomach ulcer or GI bleeding, talk with your healthcare provider before using these medicines.    For leg strains: If crutches have been recommended, don t put full weight on the hurt leg until you can do so without pain. You can return to sports when you are able to hop and run on the injured leg without pain.  Follow-up care  Follow up with your healthcare provider, or as advised.  When to seek medical advice  Call your healthcare provider right away if any of these occur:    The toes of the injured leg become swollen, cold, blue, numb, or tingly    Pain or swelling increases  Date Last Reviewed: 11/19/2015 2000-2017 [employer]. [address] [place] [address], [place] [address]. All rights reserved. This information is not intended as a substitute for professional medical care. Always follow your healthcare professional's instructions.

## 2018-04-08 NOTE — NURSING NOTE
"Chief Complaint   Patient presents with     Muscle Pain     r hamstring       Initial /75 (BP Location: Left arm, Patient Position: Chair, Cuff Size: Adult Regular)  Pulse 73  Temp 98.4  F (36.9  C) (Oral)  Wt 130 lb (59 kg)  SpO2 99%  BMI 22.31 kg/m2 Estimated body mass index is 22.31 kg/(m^2) as calculated from the following:    Height as of 10/13/17: 5' 4\" (1.626 m).    Weight as of this encounter: 130 lb (59 kg).  Medication Reconciliation: complete     Judith Crespo CMA      "

## 2018-04-08 NOTE — MR AVS SNAPSHOT
After Visit Summary   4/8/2018    Cuca Jung    MRN: [id number]           Patient Information     Date Of Birth          1985        Visit Information        Provider Department      4/8/2018 11:35 AM Nguyen Flaherty MD [facility]        Today's Diagnoses     Muscle strain of thigh, right, initial encounter    -  1      Care Instructions      Muscle Strain in the Extremities  A muscle strain is a stretching and tearing of muscle fibers. This causes pain, especially when you move that muscle. There may also be some swelling and bruising.  Home care    Keep the hurt area raised to reduce pain and swelling. This is especially important during the first 48 hours.    Apply an ice pack over the injured area for 15 to 20 minutes every 3 to 6 hours. You should do this for the first 24 to 48 hours. You can make an ice pack by filling a plastic bag that seals at the top with ice cubes and then wrapping it with a thin towel. Be careful not to injure your skin with the ice treatments. Ice should never be applied directly to skin. Continue the use of ice packs for relief of pain and swelling as needed. After 48 hours, apply heat (warm shower or warm bath) for 15 to 20 minutes several times a day, or alternate ice and heat.    You may use over-the-counter pain medicine to control pain, unless another medicine was prescribed. If you have chronic liver or kidney disease or ever had a stomach ulcer or GI bleeding, talk with your healthcare provider before using these medicines.    For leg strains: If crutches have been recommended, don t put full weight on the hurt leg until you can do so without pain. You can return to sports when you are able to hop and run on the injured leg without pain.  Follow-up care  Follow up with your healthcare provider, or as advised.  When to seek medical advice  Call your healthcare provider right away if any of these occur:    The toes of the injured  leg become swollen, cold, blue, numb, or tingly    Pain or swelling increases  Date Last Reviewed: 11/19/2015 2000-2017 [employer]. [address] [place] [address], [place] [address]. All rights reserved. This information is not intended as a substitute for professional medical care. Always follow your healthcare professional's instructions.                Follow-ups after your visit        Who to contact     If you have questions or need follow up information about today's clinic visit or your schedule please contact [facility] directly at [phone].  Normal or non-critical lab and imaging results will be communicated to you by [email or web address], letter or phone within 4 business days after the clinic has received the results. If you do not hear from us within 7 days, please contact the clinic through [employer]Bank[employer] or phone. If you have a critical or abnormal lab result, we will notify you by phone as soon as possible.  Submit refill requests through Orderlord or call your pharmacy and they will forward the refill request to us. Please allow 3 business days for your refill to be completed.          Additional Information About Your Visit        MyChart Information     [employer] gives you secure access to your electronic health record. If you see a primary care provider, you can also send messages to your care team and make appointments. If you have questions, please call your primary care clinic.  If you do not have a primary care provider, please call [phone] and they will assist you.        Care EveryWhere ID     This is your Care EveryWhere ID. This could be used by other organizations to access your [facility] medical records  [id number]        Your Vitals Were     Pulse Temperature Pulse Oximetry BMI (Body Mass Index)          73 98.4  F (36.9  C) (Oral) 99% 22.31 kg/m2         Blood Pressure from Last 3 Encounters:   04/08/18 116/75   10/13/17 112/64   08/25/17 101/69    Weight from Last 3  Encounters:   04/08/18 130 lb (59 kg)   10/13/17 126 lb 11.2 oz (57.5 kg)   08/25/17 125 lb (56.7 kg)              Today, you had the following     No orders found for display       Primary Care Provider    None Specified       No primary provider on file.        Equal Access to Services     MARCELA OLIVEIRA : Hadii aad ku hadaurydeirdre Sobryce, waaxda luqadaha, qaybta kaalmada adecamilayaallison, jean-paul fultonkiamelinda casillas . So [facility] [phone].    ATENCIÓN: Si habla español, tiene a garcia disposición servicios gratuitos de asistencia lingüística. Sheilaame al [phone].    We comply with applicable federal civil rights laws and [place] laws. We do not discriminate on the basis of race, color, national origin, age, disability, sex, sexual orientation, or gender identity.            Thank you!     Thank you for choosing [facility]  for your care. Our goal is always to provide you with excellent care. Hearing back from our patients is one way we can continue to improve our services. Please take a few minutes to complete the written survey that you may receive in the mail after your visit with us. Thank you!             Your Updated Medication List - Protect others around you: Learn how to safely use, store and throw away your medicines at www.disposemymeds.org.          This list is accurate as of 4/8/18 12:32 PM.  Always use your most recent med list.                   Brand Name Dispense Instructions for use Diagnosis    * albuterol 108 (90 BASE) MCG/ACT Inhaler    PROAIR HFA/PROVENTIL HFA/VENTOLIN HFA    1 Inhaler    Inhale 2-4 puffs into the lungs every 4 hours as needed for shortness of breath / dyspnea or wheezing Reported on 4/17/2017    Mild intermittent asthma without complication       * albuterol (2.5 MG/3ML) 0.083% neb solution     50 vial    Take 1 vial (2.5 mg) by nebulization every 6 hours as needed for shortness of breath / dyspnea or wheezing    Mild intermittent asthma, uncomplicated        budesonide 0.5 MG/2ML neb solution    PULMICORT    1 Box    Take 2 mLs (0.5 mg) by nebulization daily    Mild intermittent asthma, uncomplicated       CALCIUM + D PO           FIBER PO           LORYNA PO       Cervical high risk human papillomavirus (HPV) DNA test positive       MELATONIN PO           Multi-vitamin Tabs tablet      Take 1 tablet by mouth daily        PROBIOTIC DAILY PO           * Notice:  This list has 2 medication(s) that are the same as other medications prescribed for you. Read the directions carefully, and ask your doctor or other care provider to review them with you.

## 2018-04-08 NOTE — LETTER
[facility]  [address]  [facility] [address]      April 8, 2018    RE:  Cuca Jung                                                                                                                                                       [address] [facility] [address]  [place] [facility] [address]            To whom it may concern:    Cuca Jung is under my professional care for Muscle strain of thigh, right, initial encounter.   She  may return to work with the following: Light duty-no standing or walking more than 2 hours per shift for 7 days .          Sincerely,        Nguyen Flaherty MD    [facility]

## 2018-05-15 ENCOUNTER — OFFICE VISIT (OUTPATIENT)
Dept: URGENT CARE | Facility: URGENT CARE | Age: 33
End: 2018-05-15
Payer: COMMERCIAL

## 2018-05-15 VITALS
RESPIRATION RATE: 16 BRPM | SYSTOLIC BLOOD PRESSURE: 117 MMHG | TEMPERATURE: 98.7 F | OXYGEN SATURATION: 99 % | HEART RATE: 89 BPM | BODY MASS INDEX: 21.97 KG/M2 | WEIGHT: 128 LBS | DIASTOLIC BLOOD PRESSURE: 79 MMHG

## 2018-05-15 DIAGNOSIS — R10.30 LOWER ABDOMINAL PAIN: ICD-10-CM

## 2018-05-15 DIAGNOSIS — R19.7 DIARRHEA, UNSPECIFIED TYPE: Primary | ICD-10-CM

## 2018-05-15 LAB
ALBUMIN UR-MCNC: ABNORMAL MG/DL
APPEARANCE UR: CLEAR
BILIRUB UR QL STRIP: NEGATIVE
COLOR UR AUTO: YELLOW
GLUCOSE UR STRIP-MCNC: NEGATIVE MG/DL
HGB UR QL STRIP: NEGATIVE
KETONES UR STRIP-MCNC: NEGATIVE MG/DL
LEUKOCYTE ESTERASE UR QL STRIP: NEGATIVE
MUCOUS THREADS #/AREA URNS LPF: PRESENT /LPF
NITRATE UR QL: NEGATIVE
NON-SQ EPI CELLS #/AREA URNS LPF: ABNORMAL /LPF
PH UR STRIP: 5.5 PH (ref 5–7)
RBC #/AREA URNS AUTO: ABNORMAL /HPF
SOURCE: ABNORMAL
SP GR UR STRIP: >1.03 (ref 1–1.03)
UROBILINOGEN UR STRIP-ACNC: 0.2 EU/DL (ref 0.2–1)
WBC #/AREA URNS AUTO: ABNORMAL /HPF

## 2018-05-15 PROCEDURE — 99213 OFFICE O/P EST LOW 20 MIN: CPT | Performed by: INTERNAL MEDICINE

## 2018-05-15 PROCEDURE — 81001 URINALYSIS AUTO W/SCOPE: CPT | Performed by: INTERNAL MEDICINE

## 2018-05-15 RX ORDER — ONDANSETRON 4 MG/1
4 TABLET, FILM COATED ORAL EVERY 6 HOURS PRN
Qty: 18 TABLET | Refills: 0 | Status: SHIPPED | OUTPATIENT
Start: 2018-05-15 | End: 2018-11-07

## 2018-05-15 NOTE — PROGRESS NOTES
SUBJECTIVE:  Cuca Jung is an 32 year old female who presents for not feeling well.  Yesterday started having stomach issues and diarrhea.  Has some low back and flank pain and has h/o UTIs and kidney stones.  Fever up to 101.  Stools are like brown water, over 10 times today.  As soon as eats or drinks, stomach gets agitated and has stools.  No blood in the stools.  No recent travel.  Took some pepto and tums and ibuprofen, which don't help much.  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  Did eat some chicken she now questions if was good.  No recent travel.  No skin rashes.  No cough or runny nose.  Has some fatigue.  No recent abx.  No vaginal itching or discharge.  Has IUD in place.  She doesn not have concerns for STDs.       has a past medical history of ASCUS with positive high risk HPV cervical (1/15/09, 08/25/2017); Borderline high cholesterol; History of colposcopy (7/19/2006, 06/10/2008); Mild intermittent asthma, uncomplicated; NO ACTIVE PROBLEMS; and Papanicolaou smear of cervix with low grade squamous intraepithelial lesion (LGSIL) (7/12/2006, 05/13/2008).  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 [employer]     Social History Main Topics     Smoking status: Never Smoker     Smokeless tobacco: Never Used     Alcohol use 0.0 oz/week     0 Standard drinks or equivalent per week      Comment: once per month     Drug use: No     Sexual activity: Yes     Partners: Male     Birth control/ protection: Condom, Pill     Other Topics Concern     None     Social History Narrative    ** Merged History Encounter **          Family History   Problem Relation Age of Onset     Breast Cancer Mother 40     Mastectomy in Early 40s     Hypertension Mother      CEREBROVASCULAR DISEASE Mother      Hypertension Father      Hypertension Paternal  Grandfather      Hypertension Paternal Grandmother      Thyroid Disease Maternal Uncle      DIABETES No family hx of      Glaucoma No family hx of      Macular Degeneration No family hx of      Lipids Father      Neurologic Disorder Paternal Grandmother      Parkinson's     Neurologic Disorder Other      MS       ALLERGIES:  Seasonal allergies; Staples; Adhesive tape; and Nickel    Current Outpatient Prescriptions   Medication     albuterol (2.5 MG/3ML) 0.083% neb solution     albuterol (PROAIR HFA/PROVENTIL HFA/VENTOLIN HFA) 108 (90 BASE) MCG/ACT Inhaler     budesonide (PULMICORT) 0.5 MG/2ML neb solution     Calcium Citrate-Vitamin D (CALCIUM + D PO)     Drospirenone-Ethinyl Estradiol (LORYNA PO)     FIBER PO     MELATONIN PO     multivitamin, therapeutic with minerals (MULTI-VITAMIN) TABS     Probiotic Product (PROBIOTIC DAILY PO)     No current facility-administered medications for this visit.          ROS:  ROS is done and is negative for general, constitutional, eye, ENT, Respiratory, cardiovascular, GI, , Skin, musculoskeletal except as noted elsewhere.      OBJECTIVE:  /79 (BP Location: Left arm, Patient Position: Chair, Cuff Size: Adult Regular)  Pulse 89  Temp 98.7  F (37.1  C) (Oral)  Resp 16  Wt 128 lb (58.1 kg)  SpO2 99%  BMI 21.97 kg/m2  GENERAL APPEARANCE: Alert, in no acute distress  EYES: normal  NOSE:normal  OROPHARYNX:normal, mmm  NECK:No adenopathy,masses or thyromegaly  RESP: normal and clear to auscultation  CV:regular rate and rhythm and no murmurs, clicks, or gallops  ABDOMEN: Abdomen soft. BS normal.  Lower abdomen diffuse mild tenderness with moderate tenderness over mid suprapubic region. No rebound. No masses, organomegaly  SKIN: no ulcers, lesions or rash  MUSCULOSKELETAL:Musculoskeletal normal      RESULTS  Results for orders placed or performed in visit on 10/13/17   Beta HCG qual IFA urine   Result Value Ref Range    Beta HCG Qual IFA Urine Negative NEG^Negative     "  Surgical pathology exam   Result Value Ref Range    Copath Report       Patient Name: JOCELINE MANZANO  MR#: [id number]  Specimen #: [id number]  Collected: 10/13/2017  Received: 10/16/2017  Reported: 10/18/2017 11:12  Ordering Phy(s): JEWELL HANCOCK    For improved result formatting, select 'View Enhanced Report Format'  under Linked Documents section.    SPECIMEN(S):  Cervical biopsy, 3 o'clock    FINAL DIAGNOSIS:  Cervix at 3:00, biopsy:  Cervix from transformation zone with:  - A small amount of very thin atypical squamous epithelium of  undetermined significance.  - Normal endocervical glandular epithelium.  - Severe chronic cervicitis.    Electronically signed out by:    Kavon Beltran M.D.    CLINICAL HISTORY:  31 year old female with ASCUS pap smear [id number] and positive high risk  HPV, not 16 or 18, on 08/25/2017.    GROSS:  The specimen is received in formalin, labeled with the patient's name  and date of birth, and designated \"cervical biopsy 3:00\". It consists of  one fragment of tan-white soft tissue admixed with a scant amount of  mucus , measuring up to 0.3 cm in greatest dimension.  Entirely submitted  in one cassette. (Dictated by: Deja QUINTANA 10/16/2017 10:36 AM)    MICROSCOPIC:  Microscopic examination is performed.  Multiple deeper tissue levels are  also examined microscopically.  Immunohistochemical antibody assay for  p16 is performed and is negative in the squamous epithelium.  The  immunohistochemical control reacts appropriately.    CPT Codes:  A: 18071-XQ6, 14071-EKY    TESTING LAB LOCATION:  [address] [address] [phone]    COLLECTION SITE:  Client: [facility]  Location: [facility]OB (B)     .  No results found for this or any previous visit (from the past 48 hour(s)).    ASSESSMENT/PLAN:    ASSESSMENT / PLAN:  (R19.7) Diarrhea, unspecified type  (primary " encounter diagnosis)  Comment: currently c/w viral or food bourne illness.  Does have fever, so cannot r/o bacterial etiology.    Plan: Enteric Bacteria and Virus Panel by NATHAN Stool,         Clostridium difficile Toxin B PCR, Ova and         Parasite Exam Routine, ondansetron (ZOFRAN) 4         MG tablet        Discussed with pt that sxs will likely improve over the next 2 days.  zofran prn if develops nausea.  I reviewed supportive care including hydration, expected course, and signs of concern including dehydration or worsened abd pain.  Follow up as needed or if she does not improve within 2 day(s) or if worsens in any way.  Reviewed red flag symptoms and is to go to the ER if experiences any of these.  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    (R10.30) Lower abdominal pain  Comment: check UA as has h/o UTI and kidney stones, but UA not c/w with either of these.  Pain is c/w diarrhea.  Plan: *UA reflex to Microscopic and Culture ([place]         and [facility] (except [place] and         [place]), Urine Microscopic        I reviewed supportive care, expected course, and signs of concern.  Follow up as needed or if she does not improve within 2 day(s) or if worsens in any way.  Reviewed red flag symptoms and is to go to the ER if experiences any of these.      See [facility] for orders, medications, letters, patient instructions    Lana Humphries M.D.

## 2018-05-15 NOTE — MR AVS SNAPSHOT
After Visit Summary   5/15/2018    Cuca Jung    MRN: [id number]           Patient Information     Date Of Birth          1985        Visit Information        Provider Department      5/15/2018 5:15 PM Lana Humphries MD [facility]        Today's Diagnoses     Diarrhea, unspecified type    -  1    Lower abdominal pain           Follow-ups after your visit        Follow-up notes from your care team     Return in about 2 days (around 5/17/2018), or if symptoms worsen or fail to improve, for follow up with primary doctor.      Future tests that were ordered for you today     Open Future Orders        Priority Expected Expires Ordered    Enteric Bacteria and Virus Panel by NATHAN Stool Routine  5/15/2019 5/15/2018    Clostridium difficile Toxin B PCR Routine  6/14/2018 5/15/2018    Ova and Parasite Exam Routine Routine  5/15/2019 5/15/2018            Who to contact     If you have questions or need follow up information about today's clinic visit or your schedule please contact [facility] directly at [phone].  Normal or non-critical lab and imaging results will be communicated to you by MyChart, letter or phone within 4 business days after the clinic has received the results. If you do not hear from us within 7 days, please contact the clinic through Compliance 360hart or phone. If you have a critical or abnormal lab result, we will notify you by phone as soon as possible.  Submit refill requests through GenSpera or call your pharmacy and they will forward the refill request to us. Please allow 3 business days for your refill to be completed.          Additional Information About Your Visit        Compliance 360hart Information     [employer]Sper[employer] gives you secure access to your electronic health record. If you see a primary care provider, you can also send messages to your care team and make appointments. If you have questions, please call your primary care clinic.  If you do not  have a primary care provider, please call [phone] and they will assist you.        Care EveryWhere ID     This is your Care EveryWhere ID. This could be used by other organizations to access your [facility] medical records  [id number]        Your Vitals Were     Pulse Temperature Respirations Pulse Oximetry BMI (Body Mass Index)       89 98.7  F (37.1  C) (Oral) 16 99% 21.97 kg/m2        Blood Pressure from Last 3 Encounters:   05/15/18 117/79   04/08/18 116/75   10/13/17 112/64    Weight from Last 3 Encounters:   05/15/18 128 lb (58.1 kg)   04/08/18 130 lb (59 kg)   10/13/17 126 lb 11.2 oz (57.5 kg)              We Performed the Following     *UA reflex to Microscopic and Culture ([facility] (except [place] and [place])     Urine Microscopic          Today's Medication Changes          These changes are accurate as of 5/15/18  6:55 PM.  If you have any questions, ask your nurse or doctor.               Start taking these medicines.        Dose/Directions    ondansetron 4 MG tablet   Commonly known as:  ZOFRAN   Used for:  Diarrhea, unspecified type   Started by:  Lana Humphries MD        Dose:  4 mg   Take 1 tablet (4 mg) by mouth every 6 hours as needed for nausea   Quantity:  18 tablet   Refills:  0            Where to get your medicines      These medications were sent to [facility] [id number] [place] [facility] [id number] [place] [address]  [id number] [place] [id number]     Phone:  [phone]     ondansetron 4 MG tablet                Primary Care Provider Fax #    Physician No Ref-Primary [phone]       No address on file        Equal Access to Services     [facility] ROXANNE : Radha Cerrato, waaxda luqadaha, qaybta kaalmajean-paul sousa. So [facility] [phone].    ATENCIÓN: Si habla español, tiene a garcia disposición servicios gratuitos de asistencia lingüística. Llame al [phone].    We comply with applicable federal  civil rights laws and [place] laws. We do not discriminate on the basis of race, color, national origin, age, disability, sex, sexual orientation, or gender identity.            Thank you!     Thank you for choosing [facility]  for your care. Our goal is always to provide you with excellent care. Hearing back from our patients is one way we can continue to improve our services. Please take a few minutes to complete the written survey that you may receive in the mail after your visit with us. Thank you!             Your Updated Medication List - Protect others around you: Learn how to safely use, store and throw away your medicines at www.disposemymeds.org.          This list is accurate as of 5/15/18  6:55 PM.  Always use your most recent med list.                   Brand Name Dispense Instructions for use Diagnosis    * albuterol 108 (90 Base) MCG/ACT Inhaler    PROAIR HFA/PROVENTIL HFA/VENTOLIN HFA    1 Inhaler    Inhale 2-4 puffs into the lungs every 4 hours as needed for shortness of breath / dyspnea or wheezing Reported on 4/17/2017    Mild intermittent asthma without complication       * albuterol (2.5 MG/3ML) 0.083% neb solution     50 vial    Take 1 vial (2.5 mg) by nebulization every 6 hours as needed for shortness of breath / dyspnea or wheezing    Mild intermittent asthma, uncomplicated       budesonide 0.5 MG/2ML neb solution    PULMICORT    1 Box    Take 2 mLs (0.5 mg) by nebulization daily    Mild intermittent asthma, uncomplicated       CALCIUM + D PO           FIBER PO           LORYNA PO       Cervical high risk human papillomavirus (HPV) DNA test positive       MELATONIN PO           Multi-vitamin Tabs tablet      Take 1 tablet by mouth daily        ondansetron 4 MG tablet    ZOFRAN    18 tablet    Take 1 tablet (4 mg) by mouth every 6 hours as needed for nausea    Diarrhea, unspecified type       PROBIOTIC DAILY PO           * Notice:  This list has 2 medication(s) that are  the same as other medications prescribed for you. Read the directions carefully, and ask your doctor or other care provider to review them with you.

## 2018-05-18 DIAGNOSIS — R19.7 DIARRHEA, UNSPECIFIED TYPE: ICD-10-CM

## 2018-05-18 LAB
C DIFF TOX B STL QL: NORMAL
SPECIMEN SOURCE: NORMAL

## 2018-05-18 PROCEDURE — 87209 SMEAR COMPLEX STAIN: CPT | Performed by: INTERNAL MEDICINE

## 2018-05-18 PROCEDURE — 87506 IADNA-DNA/RNA PROBE TQ 6-11: CPT | Performed by: INTERNAL MEDICINE

## 2018-05-18 PROCEDURE — 87177 OVA AND PARASITES SMEARS: CPT | Performed by: INTERNAL MEDICINE

## 2018-05-21 LAB
O+P STL MICRO: NORMAL
O+P STL MICRO: NORMAL
SPECIMEN SOURCE: NORMAL

## 2018-06-16 ENCOUNTER — OFFICE VISIT (OUTPATIENT)
Dept: URGENT CARE | Facility: URGENT CARE | Age: 33
End: 2018-06-16
Payer: COMMERCIAL

## 2018-06-16 VITALS
TEMPERATURE: 99.2 F | SYSTOLIC BLOOD PRESSURE: 129 MMHG | WEIGHT: 128 LBS | DIASTOLIC BLOOD PRESSURE: 85 MMHG | HEART RATE: 84 BPM | OXYGEN SATURATION: 98 % | BODY MASS INDEX: 21.97 KG/M2

## 2018-06-16 DIAGNOSIS — T14.8XXA WOUND INFECTION: Primary | ICD-10-CM

## 2018-06-16 DIAGNOSIS — L08.9 WOUND INFECTION: Primary | ICD-10-CM

## 2018-06-16 PROCEDURE — 99213 OFFICE O/P EST LOW 20 MIN: CPT | Performed by: FAMILY MEDICINE

## 2018-06-16 RX ORDER — CEPHALEXIN 500 MG/1
500 CAPSULE ORAL 3 TIMES DAILY
Qty: 30 CAPSULE | Refills: 0 | Status: SHIPPED | OUTPATIENT
Start: 2018-06-16 | End: 2018-11-07

## 2018-06-16 NOTE — PATIENT INSTRUCTIONS
Laceration: Infected Repair  You have a laceration, or a cut through the skin. It has been closed with stitches. But the cut has become infected. Infection is more likely to occur if:    The wound was deep or a puncture wound. These are more likely to get infected than shallow or wide-open wounds that are easier to clean.    Dirt or particles were deep into the wound at the time of injury    You have diabetes, HIV infection, or other problem that affects your immune system    You are taking medicine that affects your immune system    Home care of the laceration was not followed carefully  Treatment of an infected repair may require removal of some or all of the stitches. You may need to take oral antibiotic medicine to treat the infection.  Home care    If antibiotics have been prescribed, take them exactly as directed. Don't stop taking them until they are gone or you are told to stop, even if you feel better.     Follow the healthcare provider s instructions on how to care for the cut.    Unless otherwise instructed, change the bandage twice a day for the first few days, until the drainage stops. Then change it once a day. Change the bandage if it becomes wet, stained with wound fluid, or dirty.  Clean the wound daily:    After removing the bandage, gently wash the area with soap and water. Use a wet cotton swab to loosen and remove any blood or crust that forms.    After cleaning, apply a thin layer of over-the-counter antibiotic ointment if advised. Reapply a fresh bandage.    Follow the healthcare provider's instructions for keeping the wound dry. You may be given restrictions on showering or tub baths.    If the bandage gets wet, remove it. Gently pat the wound dry with a clean cloth, then replace the wet bandage with a dry one.    Don't scratch, rub, or pick at the area.    Wash your hands with soap and warm water before and after cleaning the wound or changing the bandage.  Follow-up care  Follow up with  your healthcare provider, or as advised. It is important to follow up to make sure the infection is improving.  When to seek medical advice  Call your healthcare provider right away if any of these occur:    Symptoms don't begin to improve    Wound pain, redness, or swelling increases    Red streaks spread from the wound    Increase in pus coming from the wound    Fever of 100.4 F (38 C) or higher, or as directed by your healthcare provider  Date Last Reviewed: 7/1/2017 2000-2017 [employer]. [address] [place] [address]. All rights reserved. This information is not intended as a substitute for professional medical care. Always follow your healthcare professional's instructions.

## 2018-06-16 NOTE — MR AVS SNAPSHOT
After Visit Summary   6/16/2018    Cuca Jung    MRN: [id number]           Patient Information     Date Of Birth          1985        Visit Information        Provider Department      6/16/2018 9:05 AM Nguyen Flaherty MD [facility]        Today's Diagnoses     Wound infection    -  1      Care Instructions      Laceration: Infected Repair  You have a laceration, or a cut through the skin. It has been closed with stitches. But the cut has become infected. Infection is more likely to occur if:    The wound was deep or a puncture wound. These are more likely to get infected than shallow or wide-open wounds that are easier to clean.    Dirt or particles were deep into the wound at the time of injury    You have diabetes, HIV infection, or other problem that affects your immune system    You are taking medicine that affects your immune system    Home care of the laceration was not followed carefully  Treatment of an infected repair may require removal of some or all of the stitches. You may need to take oral antibiotic medicine to treat the infection.  Home care    If antibiotics have been prescribed, take them exactly as directed. Don't stop taking them until they are gone or you are told to stop, even if you feel better.     Follow the healthcare provider s instructions on how to care for the cut.    Unless otherwise instructed, change the bandage twice a day for the first few days, until the drainage stops. Then change it once a day. Change the bandage if it becomes wet, stained with wound fluid, or dirty.  Clean the wound daily:    After removing the bandage, gently wash the area with soap and water. Use a wet cotton swab to loosen and remove any blood or crust that forms.    After cleaning, apply a thin layer of over-the-counter antibiotic ointment if advised. Reapply a fresh bandage.    Follow the healthcare provider's instructions for keeping the wound dry. You may  be given restrictions on showering or tub baths.    If the bandage gets wet, remove it. Gently pat the wound dry with a clean cloth, then replace the wet bandage with a dry one.    Don't scratch, rub, or pick at the area.    Wash your hands with soap and warm water before and after cleaning the wound or changing the bandage.  Follow-up care  Follow up with your healthcare provider, or as advised. It is important to follow up to make sure the infection is improving.  When to seek medical advice  Call your healthcare provider right away if any of these occur:    Symptoms don't begin to improve    Wound pain, redness, or swelling increases    Red streaks spread from the wound    Increase in pus coming from the wound    Fever of 100.4 F (38 C) or higher, or as directed by your healthcare provider  Date Last Reviewed: 7/1/2017 2000-2017 [employer]. [address] [place] [address]. All rights reserved. This information is not intended as a substitute for professional medical care. Always follow your healthcare professional's instructions.                Follow-ups after your visit        Who to contact     If you have questions or need follow up information about today's clinic visit or your schedule please contact [facility] directly at [phone].  Normal or non-critical lab and imaging results will be communicated to you by Venus Concepthart, letter or phone within 4 business days after the clinic has received the results. If you do not hear from us within 7 days, please contact the clinic through [employer] Concepthart or phone. If you have a critical or abnormal lab result, we will notify you by phone as soon as possible.  Submit refill requests through Aliveshoes or call your pharmacy and they will forward the refill request to us. Please allow 3 business days for your refill to be completed.          Additional Information About Your Visit        Aliveshoes Information     [employer] gives you  secure access to your electronic health record. If you see a primary care provider, you can also send messages to your care team and make appointments. If you have questions, please call your primary care clinic.  If you do not have a primary care provider, please call [phone] and they will assist you.        Care EveryWhere ID     This is your Care EveryWhere ID. This could be used by other organizations to access your [facility] medical records  [id number]        Your Vitals Were     Pulse Temperature Pulse Oximetry BMI (Body Mass Index)          84 99.2  F (37.3  C) (Oral) 98% 21.97 kg/m2         Blood Pressure from Last 3 Encounters:   06/16/18 129/85   05/15/18 117/79   04/08/18 116/75    Weight from Last 3 Encounters:   06/16/18 128 lb (58.1 kg)   05/15/18 128 lb (58.1 kg)   04/08/18 130 lb (59 kg)              Today, you had the following     No orders found for display         Today's Medication Changes          These changes are accurate as of 6/16/18  9:16 AM.  If you have any questions, ask your nurse or doctor.               Start taking these medicines.        Dose/Directions    cephALEXin 500 MG capsule   Commonly known as:  KEFLEX   Used for:  Wound infection   Started by:  Nguyen Flaherty MD        Dose:  500 mg   Take 1 capsule (500 mg) by mouth 3 times daily   Quantity:  30 capsule   Refills:  0            Where to get your medicines      These medications were sent to [place] [address] [place] [facility] [id number] [place] [address]  [address] [facility] [address]     Phone:  [phone]     cephALEXin 500 MG capsule                Primary Care Provider Fax #    Physician No Ref-Primary [phone]       No address on file        Equal Access to Services     MARCELA OLIVEIRA : Radha Cerrato, elizabeth alvarado, jean-paul caal. So [facility] [phone].    ATENCIÓN: Si habla español, tiene a garcia disposición servicios  dian de asistencia lingüística. Key palafox [phone].    We comply with applicable federal civil rights laws and [place] laws. We do not discriminate on the basis of race, color, national origin, age, disability, sex, sexual orientation, or gender identity.            Thank you!     Thank you for choosing [facility]  for your care. Our goal is always to provide you with excellent care. Hearing back from our patients is one way we can continue to improve our services. Please take a few minutes to complete the written survey that you may receive in the mail after your visit with us. Thank you!             Your Updated Medication List - Protect others around you: Learn how to safely use, store and throw away your medicines at www.disposemymeds.org.          This list is accurate as of 6/16/18  9:16 AM.  Always use your most recent med list.                   Brand Name Dispense Instructions for use Diagnosis    * albuterol 108 (90 Base) MCG/ACT Inhaler    PROAIR HFA/PROVENTIL HFA/VENTOLIN HFA    1 Inhaler    Inhale 2-4 puffs into the lungs every 4 hours as needed for shortness of breath / dyspnea or wheezing Reported on 4/17/2017    Mild intermittent asthma without complication       * albuterol (2.5 MG/3ML) 0.083% neb solution     50 vial    Take 1 vial (2.5 mg) by nebulization every 6 hours as needed for shortness of breath / dyspnea or wheezing    Mild intermittent asthma, uncomplicated       budesonide 0.5 MG/2ML neb solution    PULMICORT    1 Box    Take 2 mLs (0.5 mg) by nebulization daily    Mild intermittent asthma, uncomplicated       CALCIUM + D PO           cephALEXin 500 MG capsule    KEFLEX    30 capsule    Take 1 capsule (500 mg) by mouth 3 times daily    Wound infection       FIBER PO           LORYNA PO       Cervical high risk human papillomavirus (HPV) DNA test positive       MELATONIN PO           Multi-vitamin Tabs tablet      Take 1 tablet by mouth daily         ondansetron 4 MG tablet    ZOFRAN    18 tablet    Take 1 tablet (4 mg) by mouth every 6 hours as needed for nausea    Diarrhea, unspecified type       PROBIOTIC DAILY PO           * Notice:  This list has 2 medication(s) that are the same as other medications prescribed for you. Read the directions carefully, and ask your doctor or other care provider to review them with you.

## 2018-06-16 NOTE — PROGRESS NOTES
SUBJECTIVE:   Chief Complaint   Patient presents with     Knee Pain     Patient complains of pain and redness in left knee after having surgery      Cuca Jung is a 32 year old female who presents for evaluation of and area of redness, tenderness, swelling and warmth of the skin that developed on the  left, distal  Lateral thigh  She had excision of a melanoma at the site 10 days ago and in the last 2 days has noticed increased redness, tenderness and warmth.  She has about 15 stitches in 8 cm wound      Therapies tried: none.    Past Medical History:   Diagnosis Date     ASCUS with positive high risk HPV cervical 1/15/09, 08/25/2017    type unknown; not 16/18     Borderline high cholesterol      History of colposcopy 7/19/2006, 06/10/2008    2006 - MERRY 1, 2008 - ECC: neg     Mild intermittent asthma, uncomplicated     exercise or smoke brings it on     NO ACTIVE PROBLEMS      Papanicolaou smear of cervix with low grade squamous intraepithelial lesion (LGSIL) 7/12/2006, 05/13/2008     Patient Active Problem List   Diagnosis     Encounter for surveillance of other contraceptive     Family history of malignant neoplasm of breast     CARDIOVASCULAR SCREENING; LDL GOAL LESS THAN 160     Mild intermittent asthma, uncomplicated     Family history of malignant neoplasm of breast     Dysplasia of cervix, low grade (MERRY 1)       ALLERGIES:  Seasonal allergies; Staples; Adhesive tape; and Nickel      Current Outpatient Prescriptions on File Prior to Visit:  albuterol (2.5 MG/3ML) 0.083% neb solution Take 1 vial (2.5 mg) by nebulization every 6 hours as needed for shortness of breath / dyspnea or wheezing   albuterol (PROAIR HFA/PROVENTIL HFA/VENTOLIN HFA) 108 (90 BASE) MCG/ACT Inhaler Inhale 2-4 puffs into the lungs every 4 hours as needed for shortness of breath / dyspnea or wheezing Reported on 4/17/2017   budesonide (PULMICORT) 0.5 MG/2ML neb solution Take 2 mLs (0.5 mg) by nebulization daily   Calcium  Citrate-Vitamin D (CALCIUM + D PO)    Drospirenone-Ethinyl Estradiol (LORYNA PO)    FIBER PO    MELATONIN PO    multivitamin, therapeutic with minerals (MULTI-VITAMIN) TABS Take 1 tablet by mouth daily   ondansetron (ZOFRAN) 4 MG tablet Take 1 tablet (4 mg) by mouth every 6 hours as needed for nausea   Probiotic Product (PROBIOTIC DAILY PO)      No current facility-administered medications on file prior to visit.     Social History   Substance Use Topics     Smoking status: Never Smoker     Smokeless tobacco: Never Used     Alcohol use 0.0 oz/week     0 Standard drinks or equivalent per week      Comment: once per month       Family History   Problem Relation Age of Onset     Breast Cancer Mother 40     Mastectomy in Early 40s     Hypertension Mother      CEREBROVASCULAR DISEASE Mother      Hypertension Father      Hypertension Paternal Grandfather      Hypertension Paternal Grandmother      Thyroid Disease Maternal Uncle      DIABETES No family hx of      Glaucoma No family hx of      Macular Degeneration No family hx of      Lipids Father      Neurologic Disorder Paternal Grandmother      Parkinson's     Neurologic Disorder Other      MS       ROS:  CONSTITUTIONAL:NEGATIVE for fever, chills,  Has felt hot flashes  INTEGUMENTARY/SKIN: NEGATIVE for worrisome rashes,    EYES: NEGATIVE for vision changes or irritation  ENT/MOUTH: NEGATIVE for ear, mouth and throat problems  GI: NEGATIVE for nausea, abdominal pain,      OBJECTIVE:  /85 (BP Location: Left arm, Patient Position: Chair, Cuff Size: Adult Regular)  Pulse 84  Temp 99.2  F (37.3  C) (Oral)  Wt 128 lb (58.1 kg)  SpO2 98%  BMI 21.97 kg/m2    Skin area involved is 9 cm by 4 cm on the left,  lateral and distal thigh.   The skin appear erythematous, moderately swollen, with tenderness and warmth to the touch.  GENERAL APPEARANCE: healthy, alert    EYES: EOMI,  PERRL, conjunctiva clear  MS:  extremities normal- no gross deformities noted, no erythema,  FROM noted in all extremities  NEURO: Normal strength and tone, sensory exam grossly normal,  normal speech and mentation     ASSESSMENT:  Wound infection     - cephALEXin (KEFLEX) 500 MG capsule; Take 1 capsule (500 mg) by mouth 3 times daily      patient to monitor for signs or symptoms of worsening/ spreading infection.    Stitches out in 4 days  Go to Urgent care or Emergency Department if worsening fevers/chills and/or spreading infection.     Use Tylenol or ibuprofen for pain or fever.

## 2018-10-03 RX ORDER — DROSPIRENONE AND ETHINYL ESTRADIOL 0.02-3(28)
1 KIT ORAL DAILY
Qty: 112 TABLET | Refills: 0 | Status: SHIPPED | OUTPATIENT
Start: 2018-10-03 | End: 2018-11-07

## 2018-10-03 NOTE — TELEPHONE ENCOUNTER
Pending Prescriptions:                       Disp   Refills    drospirenone-ethinyl estradiol (CAROLA) 3-0.*84 tab*3            Sig: Take 1 tablet by mouth daily    Due for AE - appt made. Rx sent.  Charissa Mckeon

## 2018-11-07 ENCOUNTER — OFFICE VISIT (OUTPATIENT)
Dept: OBGYN | Facility: CLINIC | Age: 33
End: 2018-11-07
Payer: COMMERCIAL

## 2018-11-07 VITALS
DIASTOLIC BLOOD PRESSURE: 61 MMHG | SYSTOLIC BLOOD PRESSURE: 103 MMHG | BODY MASS INDEX: 22.14 KG/M2 | WEIGHT: 129.7 LBS | HEART RATE: 61 BPM | HEIGHT: 64 IN | OXYGEN SATURATION: 100 %

## 2018-11-07 DIAGNOSIS — Z13.220 SCREENING FOR LIPOID DISORDERS: ICD-10-CM

## 2018-11-07 DIAGNOSIS — R25.2 MUSCLE CRAMPS: ICD-10-CM

## 2018-11-07 DIAGNOSIS — Z01.419 ENCOUNTER FOR GYNECOLOGICAL EXAMINATION WITHOUT ABNORMAL FINDING: Primary | ICD-10-CM

## 2018-11-07 DIAGNOSIS — N87.0 DYSPLASIA OF CERVIX, LOW GRADE (CIN 1): ICD-10-CM

## 2018-11-07 DIAGNOSIS — Z80.3 FAMILY HISTORY OF MALIGNANT NEOPLASM OF BREAST: ICD-10-CM

## 2018-11-07 DIAGNOSIS — Z13.0 SCREENING FOR IRON DEFICIENCY ANEMIA: ICD-10-CM

## 2018-11-07 DIAGNOSIS — J45.20 MILD INTERMITTENT ASTHMA WITHOUT COMPLICATION: ICD-10-CM

## 2018-11-07 PROCEDURE — 87624 HPV HI-RISK TYP POOLED RSLT: CPT | Performed by: OBSTETRICS & GYNECOLOGY

## 2018-11-07 PROCEDURE — 88141 CYTOPATH C/V INTERPRET: CPT | Performed by: OBSTETRICS & GYNECOLOGY

## 2018-11-07 PROCEDURE — 99395 PREV VISIT EST AGE 18-39: CPT | Performed by: OBSTETRICS & GYNECOLOGY

## 2018-11-07 PROCEDURE — 88175 CYTOPATH C/V AUTO FLUID REDO: CPT | Performed by: OBSTETRICS & GYNECOLOGY

## 2018-11-07 RX ORDER — ALBUTEROL SULFATE 90 UG/1
2-4 AEROSOL, METERED RESPIRATORY (INHALATION) EVERY 4 HOURS PRN
Qty: 1 INHALER | Refills: 11 | Status: SHIPPED | OUTPATIENT
Start: 2018-11-07 | End: 2019-12-02

## 2018-11-07 RX ORDER — DROSPIRENONE AND ETHINYL ESTRADIOL 0.02-3(28)
1 KIT ORAL DAILY
Qty: 112 TABLET | Refills: 3 | Status: SHIPPED | OUTPATIENT
Start: 2018-11-07 | End: 2019-04-05

## 2018-11-07 NOTE — PROGRESS NOTES
"Chief Complaint   Patient presents with     Physical       Initial /61 (BP Location: Left arm, Patient Position: Sitting, Cuff Size: Adult Regular)  Pulse 61  Ht 5' 4.25\" (1.632 m)  Wt 129 lb 11.2 oz (58.8 kg)  LMP 10/07/2018  SpO2 100%  Breastfeeding? No  BMI 22.09 kg/m2 Estimated body mass index is 22.09 kg/(m^2) as calculated from the following:    Height as of this encounter: 5' 4.25\" (1.632 m).    Weight as of this encounter: 129 lb 11.2 oz (58.8 kg).  BP completed using cuff size: regular    Questioned patient about current smoking habits.  Pt. has never smoked.          The following HM Due: pap smear      The following patient reported/Care Every where data was sent to:  P ABSTRACT QUALITY INITIATIVES [09806]  n/a      patient has appointment for today and orders have been placed                  Cuca is a 32 year old P0. female who presents for annual exam.     Menses are regular q 12 weeks and normal and crampy lasting 5 days.  Menses flow: light and medium.  Patient's last menstrual period was 10/07/2018.. Using oral contraceptives and condoms for contraception.  She is not currently considering pregnancy.  Besides routine health maintenance,  she would like to discuss health updates, labs, muscle cramps.  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 Has been getting more charley horse pains and not sure why.  Mother was found to have high platelets after TIA so wonders if she also needs to be concerned about that?  GYNECOLOGIC HISTORY:  Menarche: 13  Age at first intercourse: 15 Number of lifetime partners: more than 6  Cuca is sexually active with male partner(s) and is currently in monogamous relationship with boyfriend.    History sexually transmitted infections:  HPV  STI testing offered?  Declined  BELLA exposure: No  History of abnormal Pap smear: YES - updated in Problem List and Health Maintenance accordingly  Family history of breast " CA: Yes (Please explain): mother  Family history of uterine/ovarian CA: No    Family history of colon CA: No    HEALTH MAINTENANCE:  Cholesterol: (  Cholesterol   Date Value Ref Range Status   2017 265 (H) <200 mg/dL Final     Comment:     Desirable:       <200 mg/dl   2016 233 (H) <200 mg/dL Final     Comment:     Desirable:       <200 mg/dl    History of abnormal lipids: Yes  Mammo: 2017 . History of abnormal Mammo: No.  Regular Self Breast Exams: Yes  Calcium/Vitamin D intake: source:  dairy, dietary supplement(s), green leafy veggies Adequate? Yes  TSH: (  TSH   Date Value Ref Range Status   2016 2.65 0.40 - 4.00 mU/L Final    )  Pap; (  Lab Results   Component Value Date    PAP ASC-US 2017    PAP NIL 2007    PAP LSIL 2006    )    HISTORY:  Obstetric History       T0      L0     SAB0   TAB0   Ectopic0   Multiple0   Live Births0         Past Medical History:   Diagnosis Date     ASCUS with positive high risk HPV cervical 1/15/09, 2017    type unknown; not 16/18     Borderline high cholesterol      History of colposcopy 2006, 06/10/2008    2006 - MERRY 1, 2008 - ECC: neg     Mild intermittent asthma, uncomplicated     exercise or smoke brings it on     NO ACTIVE PROBLEMS      Papanicolaou smear of cervix with low grade squamous intraepithelial lesion (LGSIL) 2006, 2008     Past Surgical History:   Procedure Laterality Date     HC CREATE EARDRUM OPENING,GEN ANESTH  1991    P.E. Tubes     PE TUBES  age 2     skin cancer Left 2018    maligant melenoma     TONSILLECTOMY  age 23     Family History   Problem Relation Age of Onset     Breast Cancer Mother 40     Mastectomy in Early 40s     Hypertension Mother      Cerebrovascular Disease Mother      TIA     Blood Disease Mother      high platelets      Hypertension Father      Lipids Father      Hypertension Paternal Grandfather      Hypertension Paternal Grandmother      Neurologic Disorder  Paternal Grandmother      Parkinson's     Thyroid Disease Maternal Uncle      Neurologic Disorder Other      MS     Diabetes No family hx of      Glaucoma No family hx of      Macular Degeneration No family hx of      Social History     Social History     Marital status: Single     Spouse name: N/A     Number of children: 0     Years of education: N/A     Occupational History     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 [employer]     Social History Main Topics     Smoking status: Never Smoker     Smokeless tobacco: Never Used     Alcohol use 0.0 oz/week     0 Standard drinks or equivalent per week      Comment: once per month     Drug use: No     Sexual activity: Yes     Partners: Male     Birth control/ protection: Condom, Pill     Other Topics Concern     Not on file     Social History Narrative    ** Merged History Encounter **            Current Outpatient Prescriptions:      albuterol (PROAIR HFA/PROVENTIL HFA/VENTOLIN HFA) 108 (90 BASE) MCG/ACT Inhaler, Inhale 2-4 puffs into the lungs every 4 hours as needed for shortness of breath / dyspnea or wheezing Reported on 4/17/2017, Disp: 1 Inhaler, Rfl: 2     Calcium Citrate-Vitamin D (CALCIUM + D PO), , Disp: , Rfl:      Drospirenone-Ethinyl Estradiol (LORYNA PO), , Disp: , Rfl:      multivitamin, therapeutic with minerals (MULTI-VITAMIN) TABS, Take 1 tablet by mouth daily, Disp: , Rfl:      albuterol (2.5 MG/3ML) 0.083% neb solution, Take 1 vial (2.5 mg) by nebulization every 6 hours as needed for shortness of breath / dyspnea or wheezing (Patient not taking: Reported on 11/7/2018), Disp: 50 vial, Rfl: 5     budesonide (PULMICORT) 0.5 MG/2ML neb solution, Take 2 mLs (0.5 mg) by nebulization daily (Patient not taking: Reported on 11/7/2018), Disp: 1 Box, Rfl: 5     FIBER PO, , Disp: , Rfl:      MELATONIN PO, , Disp: , Rfl:      Probiotic Product (PROBIOTIC DAILY PO), , Disp: , Rfl:      [DISCONTINUED] drospirenone-ethinyl estradiol (CAROLA) 3-0.02 MG per  "tablet, Take 1 tablet by mouth daily Active pills only, Disp: 112 tablet, Rfl: 0     Allergies   Allergen Reactions     Seasonal Allergies      Staples      metals     Adhesive Tape Rash     Nickel Rash       Past medical, surgical, social and family history were reviewed and updated in [facility].    EXAM:  /61 (BP Location: Left arm, Patient Position: Sitting, Cuff Size: Adult Regular)  Pulse 61  Ht 5' 4.25\" (1.632 m)  Wt 129 lb 11.2 oz (58.8 kg)  LMP 10/07/2018  SpO2 100%  Breastfeeding? No  BMI 22.09 kg/m2   BMI: Body mass index is 22.09 kg/(m^2).  Constitutional: healthy, alert and no distress  Head: Normocephalic. No masses, lesions, tenderness or abnormalities  Neck: Neck supple. Trachea midline. No adenopathy. Thyroid symmetric, normal size.   Cardiovascular: RRR.   Respiratory: Negative.   Breast: Breasts reveal mild symmetric fibrocystic densities, but there are no dominant, discrete, fixed or suspicious masses found.  Gastrointestinal: Abdomen soft, non-tender, non-distended. No masses, organomegaly.  :  Vulva:  No external lesions, normal female hair distribution, no inguinal adenopathy.    Urethra:  Midline, non-tender, well supported, no discharge  Vagina:  Moist, pink, no abnormal discharge, no lesions  Uterus:  Normal size , non-tender, freely mobile  Ovaries:  No masses appreciated, non-tender, mobile  Rectal Exam: deferred  Musculoskeletal: extremities normal  Skin: no suspicious lesions or rashes  Psychiatric: Affect appropriate, cooperative,mentation appears normal.     COUNSELING:   Reviewed preventive health counseling, as reflected in patient instructions   reports that she has never smoked. She has never used smokeless tobacco.    Body mass index is 22.09 kg/(m^2).    FRAX Risk Assessment    ASSESSMENT:  32 year old female with satisfactory annual exam  (Z01.419) Encounter for gynecological examination without abnormal finding  (primary encounter diagnosis)  Comment: OCP  Plan: " drospirenone-ethinyl estradiol (CAROLA) 3-0.02 MG         per tablet        Refill done since doing well.     She will check on last tdap and get one now if due. ??    (N87.0) Dysplasia of cervix, low grade (MERRY 1)  Comment: last pap ASCUS other hr HPV  Plan: HPV High Risk Types DNA Cervical, Pap imaged         thin layer diagnostic with HPV (select HPV         order below)        Discussed if this pap is normal or ASCUS with other hr HPV can skip the colpo this year since we did one last year and trying to limit. Questions answered. Will let her know pap results when available.     (Z13.220) Screening for lipoid disorders  Comment: elevated always--discussed risk calculator  Plan: Lipid Profile        RTC fasting for lab    (Z13.0) Screening for iron deficiency anemia  Plan: CBC with platelets        RTC for lab, mother with elevated platelets.    (R25.2) Muscle cramps  Plan: Basic metabolic panel  (Ca, Cl, CO2, Creat,         Gluc, K, Na, BUN)        RTC for lab    (J45.20) Mild intermittent asthma without complication  Comment: stable  Plan: albuterol (PROAIR HFA/PROVENTIL HFA/VENTOLIN         HFA) 108 (90 Base) MCG/ACT inhaler        Refill done.    (Z80.3) Family history of malignant neoplasm of breast  Comment: mom age 40  Plan: MA Screen Bilateral w/Ayo        mammo done last year was normal, due for repeat.      Karla Bae MD

## 2018-11-07 NOTE — PATIENT INSTRUCTIONS
Make lab only appointment fasting (nothing but water 10 hours prior to blood draw) at any [facility].

## 2018-11-07 NOTE — MR AVS SNAPSHOT
After Visit Summary   11/7/2018    Cuca Jung    MRN: [id number]           Patient Information     Date Of Birth          1985        Visit Information        Provider Department      11/7/2018 9:15 AM Karla Bae MD [facility]        Today's Diagnoses     Encounter for gynecological examination without abnormal finding    -  1    Dysplasia of cervix, low grade (MERRY 1)        Screening for lipoid disorders        Screening for iron deficiency anemia        Muscle cramps        Mild intermittent asthma without complication        Family history of malignant neoplasm of breast          Care Instructions    Make lab only appointment fasting (nothing but water 10 hours prior to blood draw) at any [facility].            Follow-ups after your visit        Future tests that were ordered for you today     Open Future Orders        Priority Expected Expires Ordered    MA Screen Bilateral w/Ayo Routine  11/7/2019 11/7/2018    CBC with platelets Routine  11/7/2019 11/7/2018    Basic metabolic panel  (Ca, Cl, CO2, Creat, Gluc, K, Na, BUN) Routine  11/7/2019 11/7/2018    Lipid Profile Routine  11/7/2019 11/7/2018            Who to contact     If you have questions or need follow up information about today's clinic visit or your schedule please contact [facility] directly at [phone].  Normal or non-critical lab and imaging results will be communicated to you by MyChart, letter or phone within 4 business days after the clinic has received the results. If you do not hear from us within 7 days, please contact the clinic through MyChart or phone. If you have a critical or abnormal lab result, we will notify you by phone as soon as possible.  Submit refill requests through [employer] or call your pharmacy and they will forward the refill request to us. Please allow 3 business days for your refill to be completed.          Additional Information About Your  "Visit        [facility] Information     [employer] gives you secure access to your electronic health record. If you see a primary care provider, you can also send messages to your care team and make appointments. If you have questions, please call your primary care clinic.  If you do not have a primary care provider, please call [phone] and they will assist you.        Care EveryWhere ID     This is your Care EveryWhere ID. This could be used by other organizations to access your [place] medical records  [id number]        Your Vitals Were     Pulse Height Last Period Pulse Oximetry Breastfeeding? BMI (Body Mass Index)    61 5' 4.25\" (1.632 m) 10/07/2018 100% No 22.09 kg/m2       Blood Pressure from Last 3 Encounters:   11/07/18 103/61   06/16/18 129/85   05/15/18 117/79    Weight from Last 3 Encounters:   11/07/18 129 lb 11.2 oz (58.8 kg)   06/16/18 128 lb (58.1 kg)   05/15/18 128 lb (58.1 kg)              We Performed the Following     HPV High Risk Types DNA Cervical     Pap imaged thin layer diagnostic with HPV (select HPV order below)          Today's Medication Changes          These changes are accurate as of 11/7/18 10:50 AM.  If you have any questions, ask your nurse or doctor.               These medicines have changed or have updated prescriptions.        Dose/Directions    * albuterol (2.5 MG/3ML) 0.083% neb solution   This may have changed:  Another medication with the same name was changed. Make sure you understand how and when to take each.   Used for:  Mild intermittent asthma, uncomplicated   Changed by:  Karla Bae MD        Dose:  1 vial   Take 1 vial (2.5 mg) by nebulization every 6 hours as needed for shortness of breath / dyspnea or wheezing   Quantity:  50 vial   Refills:  5       * albuterol 108 (90 Base) MCG/ACT inhaler   Commonly known as:  PROAIR HFA/PROVENTIL HFA/VENTOLIN HFA   This may have changed:  additional instructions   Used for:  Mild intermittent asthma without " complication   Changed by:  Karla Bae MD        Dose:  2-4 puff   Inhale 2-4 puffs into the lungs every 4 hours as needed for shortness of breath / dyspnea or wheezing   Quantity:  1 Inhaler   Refills:  11       * Notice:  This list has 2 medication(s) that are the same as other medications prescribed for you. Read the directions carefully, and ask your doctor or other care provider to review them with you.      Stop taking these medicines if you haven't already. Please contact your care team if you have questions.     ondansetron 4 MG tablet   Commonly known as:  ZOFRAN   Stopped by:  Karla Bae MD                Where to get your medicines      These medications were sent to [place] [address] [place] [facility] [address] [address] [facility]  [address] [facility] [address]     Phone:  [phone]     albuterol 108 (90 Base) MCG/ACT inhaler    drospirenone-ethinyl estradiol 3-0.02 MG per tablet                Primary Care Provider Fax #    Physician No Ref-Primary [phone]       No address on file        Equal Access to Services     [facility]: Hadii esteban valencia hadasho Sobryce, waaxda luqadaha, qaybta kaalmada [place], jean-paul casillas . So [facility] [phone].    ATENCIÓN: Si habla español, tiene a garcia disposición servicios gratuitos de asistencia lingüística. [facility] [phone].    We comply with applicable federal civil rights laws and [place] laws. We do not discriminate on the basis of race, color, national origin, age, disability, sex, sexual orientation, or gender identity.            Thank you!     Thank you for choosing [facility]  for your care. Our goal is always to provide you with excellent care. Hearing back from our patients is one way we can continue to improve our services. Please take a few minutes to complete the written survey that you may receive in the mail after your visit with us. Thank you!             Your  Updated Medication List - Protect others around you: Learn how to safely use, store and throw away your medicines at www.disposemymeds.org.          This list is accurate as of 11/7/18 10:50 AM.  Always use your most recent med list.                   Brand Name Dispense Instructions for use Diagnosis    * albuterol (2.5 MG/3ML) 0.083% neb solution     50 vial    Take 1 vial (2.5 mg) by nebulization every 6 hours as needed for shortness of breath / dyspnea or wheezing    Mild intermittent asthma, uncomplicated       * albuterol 108 (90 Base) MCG/ACT inhaler    PROAIR HFA/PROVENTIL HFA/VENTOLIN HFA    1 Inhaler    Inhale 2-4 puffs into the lungs every 4 hours as needed for shortness of breath / dyspnea or wheezing    Mild intermittent asthma without complication       budesonide 0.5 MG/2ML neb solution    PULMICORT    1 Box    Take 2 mLs (0.5 mg) by nebulization daily    Mild intermittent asthma, uncomplicated       CALCIUM + D PO           FIBER PO           * LORYNA PO       Cervical high risk human papillomavirus (HPV) DNA test positive       * drospirenone-ethinyl estradiol 3-0.02 MG per tablet    CAROLA    112 tablet    Take 1 tablet by mouth daily Active pills only    Encounter for gynecological examination without abnormal finding       MELATONIN PO           Multi-vitamin Tabs tablet      Take 1 tablet by mouth daily        PROBIOTIC DAILY PO           * Notice:  This list has 4 medication(s) that are the same as other medications prescribed for you. Read the directions carefully, and ask your doctor or other care provider to review them with you.

## 2018-11-12 LAB
COPATH REPORT: ABNORMAL
PAP: ABNORMAL

## 2018-11-14 DIAGNOSIS — R25.2 MUSCLE CRAMPS: ICD-10-CM

## 2018-11-14 DIAGNOSIS — Z13.0 SCREENING FOR IRON DEFICIENCY ANEMIA: ICD-10-CM

## 2018-11-14 DIAGNOSIS — Z13.220 SCREENING FOR LIPOID DISORDERS: ICD-10-CM

## 2018-11-14 LAB
ANION GAP SERPL CALCULATED.3IONS-SCNC: 10 MMOL/L (ref 3–14)
BUN SERPL-MCNC: 14 MG/DL (ref 7–30)
CALCIUM SERPL-MCNC: 8.9 MG/DL (ref 8.5–10.1)
CHLORIDE SERPL-SCNC: 105 MMOL/L (ref 94–109)
CHOLEST SERPL-MCNC: 196 MG/DL
CO2 SERPL-SCNC: 25 MMOL/L (ref 20–32)
CREAT SERPL-MCNC: 0.82 MG/DL (ref 0.52–1.04)
ERYTHROCYTE [DISTWIDTH] IN BLOOD BY AUTOMATED COUNT: 12.3 % (ref 10–15)
FINAL DIAGNOSIS: ABNORMAL
GFR SERPL CREATININE-BSD FRML MDRD: 80 ML/MIN/1.7M2
GLUCOSE SERPL-MCNC: 83 MG/DL (ref 70–99)
HCT VFR BLD AUTO: 34.3 % (ref 35–47)
HDLC SERPL-MCNC: 76 MG/DL
HGB BLD-MCNC: 11.4 G/DL (ref 11.7–15.7)
HPV HR 12 DNA CVX QL NAA+PROBE: POSITIVE
HPV16 DNA SPEC QL NAA+PROBE: NEGATIVE
HPV18 DNA SPEC QL NAA+PROBE: NEGATIVE
LDLC SERPL CALC-MCNC: 103 MG/DL
MCH RBC QN AUTO: 28.8 PG (ref 26.5–33)
MCHC RBC AUTO-ENTMCNC: 33.2 G/DL (ref 31.5–36.5)
MCV RBC AUTO: 87 FL (ref 78–100)
NONHDLC SERPL-MCNC: 120 MG/DL
PLATELET # BLD AUTO: 266 10E9/L (ref 150–450)
POTASSIUM SERPL-SCNC: 3.5 MMOL/L (ref 3.4–5.3)
RBC # BLD AUTO: 3.96 10E12/L (ref 3.8–5.2)
SODIUM SERPL-SCNC: 140 MMOL/L (ref 133–144)
SPECIMEN DESCRIPTION: ABNORMAL
SPECIMEN SOURCE CVX/VAG CYTO: ABNORMAL
TRIGL SERPL-MCNC: 85 MG/DL
WBC # BLD AUTO: 8.3 10E9/L (ref 4–11)

## 2018-11-14 PROCEDURE — 36415 COLL VENOUS BLD VENIPUNCTURE: CPT | Performed by: OBSTETRICS & GYNECOLOGY

## 2018-11-14 PROCEDURE — 80061 LIPID PANEL: CPT | Performed by: OBSTETRICS & GYNECOLOGY

## 2018-11-14 PROCEDURE — 80048 BASIC METABOLIC PNL TOTAL CA: CPT | Performed by: OBSTETRICS & GYNECOLOGY

## 2018-11-14 PROCEDURE — 85027 COMPLETE CBC AUTOMATED: CPT | Performed by: OBSTETRICS & GYNECOLOGY

## 2019-01-01 ENCOUNTER — OFFICE VISIT (OUTPATIENT)
Dept: URGENT CARE | Facility: URGENT CARE | Age: 34
End: 2019-01-01
Payer: COMMERCIAL

## 2019-01-01 ENCOUNTER — ANCILLARY PROCEDURE (OUTPATIENT)
Dept: GENERAL RADIOLOGY | Facility: CLINIC | Age: 34
End: 2019-01-01
Attending: PHYSICIAN ASSISTANT
Payer: COMMERCIAL

## 2019-01-01 VITALS
TEMPERATURE: 98.7 F | WEIGHT: 126 LBS | RESPIRATION RATE: 19 BRPM | OXYGEN SATURATION: 98 % | BODY MASS INDEX: 21.46 KG/M2 | DIASTOLIC BLOOD PRESSURE: 90 MMHG | SYSTOLIC BLOOD PRESSURE: 131 MMHG | HEART RATE: 103 BPM

## 2019-01-01 DIAGNOSIS — R05.9 COUGH: ICD-10-CM

## 2019-01-01 DIAGNOSIS — R06.02 SOB (SHORTNESS OF BREATH): Primary | ICD-10-CM

## 2019-01-01 PROCEDURE — 94640 AIRWAY INHALATION TREATMENT: CPT | Performed by: PHYSICIAN ASSISTANT

## 2019-01-01 PROCEDURE — 99215 OFFICE O/P EST HI 40 MIN: CPT | Mod: 25 | Performed by: PHYSICIAN ASSISTANT

## 2019-01-01 RX ORDER — IPRATROPIUM BROMIDE AND ALBUTEROL SULFATE 2.5; .5 MG/3ML; MG/3ML
SOLUTION RESPIRATORY (INHALATION)
Start: 2019-01-01 | End: 2019-04-05

## 2019-01-01 NOTE — PROGRESS NOTES
S: 34 yo female here for SHORTNESS OF BREATH and wet cough for 1 month, worse the last 2 days. Albuterol and Pulmicort no help. No fever. Airplane ride mid December. No leg or calf swelling. On Carola. Nonsmoker. 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      Allergies   Allergen Reactions     Seasonal Allergies      Staples      metals     Adhesive Tape Rash     Nickel Rash       Past Medical History:   Diagnosis Date     ASCUS with positive high risk HPV cervical 1/15/09, 08/25/2017, 11/07/18    type unknown; not 16/18. 11/07/18: See problem list.      Borderline high cholesterol      History of colposcopy 7/19/2006, 06/10/2008    2006 - MERRY 1, 2008 - ECC: neg     Mild intermittent asthma, uncomplicated     exercise or smoke brings it on     Papanicolaou smear of cervix with low grade squamous intraepithelial lesion (LGSIL) 7/12/2006, 05/13/2008         Current Outpatient Medications on File Prior to Visit:  albuterol (PROAIR HFA/PROVENTIL HFA/VENTOLIN HFA) 108 (90 Base) MCG/ACT inhaler Inhale 2-4 puffs into the lungs every 4 hours as needed for shortness of breath / dyspnea or wheezing   Calcium Citrate-Vitamin D (CALCIUM + D PO)    Drospirenone-Ethinyl Estradiol (LORYNA PO)    drospirenone-ethinyl estradiol (CAROLA) 3-0.02 MG per tablet Take 1 tablet by mouth daily Active pills only   FIBER PO    MELATONIN PO    multivitamin, therapeutic with minerals (MULTI-VITAMIN) TABS Take 1 tablet by mouth daily   Probiotic Product (PROBIOTIC DAILY PO)    albuterol (2.5 MG/3ML) 0.083% neb solution Take 1 vial (2.5 mg) by nebulization every 6 hours as needed for shortness of breath / dyspnea or wheezing (Patient not taking: Reported on 1/1/2019)   budesonide (PULMICORT) 0.5 MG/2ML neb solution Take 2 mLs (0.5 mg) by nebulization daily (Patient not taking: Reported on 11/7/2018)     No current facility-administered medications on file prior to visit.     Social History     Tobacco Use     Smoking status:  Never Smoker     Smokeless tobacco: Never Used   Substance Use Topics     Alcohol use: Yes     Alcohol/week: 0.0 oz     Comment: once per month       ROS:  CONSTITUTIONAL: Negative for fatigue or fever.  EYES: Negative for eye problems.  ENT: As above.  RESP: As above.  CV: Negative for chest pains.  GI: Negative for vomiting.  MUSCULOSKELETAL:  Negative for significant muscle or joint pains.  NEUROLOGIC: Negative for headaches.  SKIN: Negative for rash.    OBJECTIVE:  /90 (BP Location: Left arm, Patient Position: Chair, Cuff Size: Adult Regular)   Pulse 103   Temp 98.7  F (37.1  C) (Oral)   Resp 19   Wt 57.2 kg (126 lb)   SpO2 98%   BMI 21.46 kg/m    GENERAL APPEARANCE: visibly appears short of breath.  EYES:Conjunctiva/sclera clear.  EARS: No cerumen.   Ear canals w/o erythema.  TM's intact w/o erythema.    NOSE/MOUTH: Nose without ulcers, erythema or lesions.  SINUSES: No maxillary sinus tenderness.  THROAT: No erythema w/o tonsillar enlargement . No exudates.  NECK: Supple, nontender, no lymphadenopathy.  RESP: Lungs clear to auscultation - no rales, rhonchi or wheezes  CV: Regular rate and rhythm, normal S1 S2, no murmur noted.  NEURO: Awake, alert    SKIN: No rashes        ASSESSMENT:     ICD-10-CM    1. SOB (shortness of breath) R06.02    2. Cough R05 INHALATION/NEBULIZER TREATMENT, INITIAL     ipratropium - albuterol 0.5 mg/2.5 mg/3 mL (DUONEB) 0.5-2.5 (3) MG/3ML neb solution     ALBUTEROL/IPRATROPIUM 3ML NEB     CANCELED: XR Chest 2 Views     CANCELED: CBC with platelets differential     CANCELED: Beta HCG Qual, Urine - [facility] and [place] ([id number])     PLAN:Duoneb did not help. She is visibly short of breath. Will go to [facility] for further eval. I worry about PE and cannot do d dimer here and get back in timely fashion to help in my decision process. [facility] called.    Natty Mera PA-C

## 2019-01-01 NOTE — NURSING NOTE
The following nebulizer treatment was given:     MEDICATION: Duoneb  : Connectivity  LOT #: [id number]    EXPIRATION DATE:  05/20  NDC # 9992-9935-71     Nebulizer Start Time:  0110  Nebulizer Stop Time:  0115  See Vital Signs Flowsheet  Judith Crespo [facility]

## 2019-01-16 ENCOUNTER — E-VISIT (OUTPATIENT)
Dept: OBGYN | Facility: CLINIC | Age: 34
End: 2019-01-16
Payer: COMMERCIAL

## 2019-01-16 DIAGNOSIS — N89.8 VAGINAL DISCHARGE: Primary | ICD-10-CM

## 2019-01-16 PROCEDURE — 99207 ZZC NO BILLABLE SERVICE THIS VISIT: CPT | Performed by: OBSTETRICS & GYNECOLOGY

## 2019-01-17 DIAGNOSIS — N89.8 VAGINAL DISCHARGE: ICD-10-CM

## 2019-01-17 LAB
SPECIMEN SOURCE: ABNORMAL
WET PREP SPEC: ABNORMAL

## 2019-01-17 PROCEDURE — 87210 SMEAR WET MOUNT SALINE/INK: CPT | Performed by: OBSTETRICS & GYNECOLOGY

## 2019-01-18 DIAGNOSIS — N76.0 BV (BACTERIAL VAGINOSIS): Primary | ICD-10-CM

## 2019-01-18 DIAGNOSIS — B96.89 BV (BACTERIAL VAGINOSIS): Primary | ICD-10-CM

## 2019-01-18 RX ORDER — METRONIDAZOLE 500 MG/1
500 TABLET ORAL 2 TIMES DAILY
Qty: 14 TABLET | Refills: 0 | Status: SHIPPED | OUTPATIENT
Start: 2019-01-18 | End: 2019-03-14

## 2019-02-27 ENCOUNTER — E-VISIT (OUTPATIENT)
Dept: OBGYN | Facility: CLINIC | Age: 34
End: 2019-02-27
Payer: COMMERCIAL

## 2019-02-27 DIAGNOSIS — N76.0 BV (BACTERIAL VAGINOSIS): Primary | ICD-10-CM

## 2019-02-27 DIAGNOSIS — B96.89 BV (BACTERIAL VAGINOSIS): Primary | ICD-10-CM

## 2019-02-27 PROCEDURE — 99444 ZZC PHYSICIAN ONLINE EVALUATION & MANAGEMENT SERVICE: CPT | Performed by: OBSTETRICS & GYNECOLOGY

## 2019-02-27 RX ORDER — METRONIDAZOLE 7.5 MG/G
1 GEL VAGINAL AT BEDTIME
Qty: 70 G | Refills: 0 | Status: SHIPPED | OUTPATIENT
Start: 2019-02-27 | End: 2019-03-14

## 2019-03-14 ENCOUNTER — OFFICE VISIT (OUTPATIENT)
Dept: FAMILY MEDICINE | Facility: CLINIC | Age: 34
End: 2019-03-14
Payer: COMMERCIAL

## 2019-03-14 VITALS
BODY MASS INDEX: 22.91 KG/M2 | HEIGHT: 64 IN | TEMPERATURE: 97.8 F | WEIGHT: 134.2 LBS | OXYGEN SATURATION: 98 % | HEART RATE: 103 BPM | DIASTOLIC BLOOD PRESSURE: 77 MMHG | SYSTOLIC BLOOD PRESSURE: 118 MMHG

## 2019-03-14 DIAGNOSIS — R20.2 PARESTHESIAS: ICD-10-CM

## 2019-03-14 DIAGNOSIS — R26.89 BALANCE PROBLEMS: ICD-10-CM

## 2019-03-14 DIAGNOSIS — R53.83 FATIGUE, UNSPECIFIED TYPE: Primary | ICD-10-CM

## 2019-03-14 DIAGNOSIS — J45.20 MILD INTERMITTENT ASTHMA, UNCOMPLICATED: ICD-10-CM

## 2019-03-14 DIAGNOSIS — Z23 NEED FOR TDAP VACCINATION: ICD-10-CM

## 2019-03-14 LAB
ANION GAP SERPL CALCULATED.3IONS-SCNC: 7 MMOL/L (ref 3–14)
BUN SERPL-MCNC: 18 MG/DL (ref 7–30)
CALCIUM SERPL-MCNC: 8.9 MG/DL (ref 8.5–10.1)
CHLORIDE SERPL-SCNC: 109 MMOL/L (ref 94–109)
CO2 SERPL-SCNC: 25 MMOL/L (ref 20–32)
CREAT SERPL-MCNC: 0.8 MG/DL (ref 0.52–1.04)
CRP SERPL-MCNC: <2.9 MG/L (ref 0–8)
ERYTHROCYTE [DISTWIDTH] IN BLOOD BY AUTOMATED COUNT: 12.3 % (ref 10–15)
ERYTHROCYTE [SEDIMENTATION RATE] IN BLOOD BY WESTERGREN METHOD: 8 MM/H (ref 0–20)
FOLATE SERPL-MCNC: 50.1 NG/ML
GFR SERPL CREATININE-BSD FRML MDRD: >90 ML/MIN/{1.73_M2}
GLUCOSE SERPL-MCNC: 105 MG/DL (ref 70–99)
HCT VFR BLD AUTO: 37.4 % (ref 35–47)
HGB BLD-MCNC: 12.8 G/DL (ref 11.7–15.7)
MCH RBC QN AUTO: 29.5 PG (ref 26.5–33)
MCHC RBC AUTO-ENTMCNC: 34.2 G/DL (ref 31.5–36.5)
MCV RBC AUTO: 86 FL (ref 78–100)
PLATELET # BLD AUTO: 295 10E9/L (ref 150–450)
POTASSIUM SERPL-SCNC: 3.8 MMOL/L (ref 3.4–5.3)
RBC # BLD AUTO: 4.34 10E12/L (ref 3.8–5.2)
SODIUM SERPL-SCNC: 141 MMOL/L (ref 133–144)
TSH SERPL DL<=0.005 MIU/L-ACNC: 1.13 MU/L (ref 0.4–4)
VIT B12 SERPL-MCNC: 360 PG/ML (ref 193–986)
WBC # BLD AUTO: 10.7 10E9/L (ref 4–11)

## 2019-03-14 PROCEDURE — 84443 ASSAY THYROID STIM HORMONE: CPT | Performed by: NURSE PRACTITIONER

## 2019-03-14 PROCEDURE — 99214 OFFICE O/P EST MOD 30 MIN: CPT | Performed by: NURSE PRACTITIONER

## 2019-03-14 PROCEDURE — 85652 RBC SED RATE AUTOMATED: CPT | Performed by: NURSE PRACTITIONER

## 2019-03-14 PROCEDURE — 82607 VITAMIN B-12: CPT | Performed by: NURSE PRACTITIONER

## 2019-03-14 PROCEDURE — 85027 COMPLETE CBC AUTOMATED: CPT | Performed by: NURSE PRACTITIONER

## 2019-03-14 PROCEDURE — 36415 COLL VENOUS BLD VENIPUNCTURE: CPT | Performed by: NURSE PRACTITIONER

## 2019-03-14 PROCEDURE — 80048 BASIC METABOLIC PNL TOTAL CA: CPT | Performed by: NURSE PRACTITIONER

## 2019-03-14 PROCEDURE — 86039 ANTINUCLEAR ANTIBODIES (ANA): CPT | Performed by: NURSE PRACTITIONER

## 2019-03-14 PROCEDURE — 82746 ASSAY OF FOLIC ACID SERUM: CPT | Performed by: NURSE PRACTITIONER

## 2019-03-14 PROCEDURE — 86038 ANTINUCLEAR ANTIBODIES: CPT | Performed by: NURSE PRACTITIONER

## 2019-03-14 PROCEDURE — 86140 C-REACTIVE PROTEIN: CPT | Performed by: NURSE PRACTITIONER

## 2019-03-14 RX ORDER — OMEGA-3 FATTY ACIDS/FISH OIL 300-1000MG
CAPSULE ORAL
Status: ON HOLD | COMMUNITY
End: 2019-04-09

## 2019-03-14 ASSESSMENT — MIFFLIN-ST. JEOR: SCORE: 1302.7

## 2019-03-14 NOTE — PROGRESS NOTES
bnp  SUBJECTIVE:   Cuca Jung is a 33 year old female who presents to clinic today for the following health issues:      Musculoskeletal problem/pain      Duration: 1 month    Description  Location: everywhere    Intensity:  moderate    Accompanying signs and symptoms: numbness, tingling and hypersensitive skin    History  Previous similar problem: no   Previous evaluation:  none    Precipitating or alleviating factors:  Trauma or overuse: no   Aggravating factors include: holding objects, lifting and certain movements    Therapies tried and outcome: nothing    Patient has concern for worsening fatigue, intermittent numbness/tingling of her extremities, hypesthesia of her skin.  She's noted weakness when holding a coffee cup, needs to hole objects with 2 hands for fear of dropping them.  She is more clumsy as well, is concerned for neurologic/rheumatologic problem. No headaches, visual changes, confusion (but admits to feeling foggy sometimes).loss of bowel. Bladder control, no skin rashes. 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.        Problem list and histories reviewed & adjusted, as indicated.  Additional history: as documented    Patient Active Problem List   Diagnosis     Encounter for surveillance of other contraceptive     Family history of malignant neoplasm of breast     CARDIOVASCULAR SCREENING; LDL GOAL LESS THAN 160     Mild intermittent asthma, uncomplicated     Family history of malignant neoplasm of breast     Dysplasia of cervix, low grade (MERRY 1)     Past Surgical History:   Procedure Laterality Date     HC CREATE EARDRUM OPENING,GEN ANESTH  1991    P.E. Tubes     PE TUBES  age 2     skin cancer Left 06/2018    maligant melenoma     TONSILLECTOMY  age 23       Social History     Tobacco Use     Smoking status: Never Smoker     Smokeless tobacco: Never Used   Substance Use Topics     Alcohol use: Yes     Alcohol/week: 0.0 oz     Comment: once per month     Family History   Problem Relation  Age of Onset     Breast Cancer Mother 40        Mastectomy in Early 40s     Hypertension Mother      Cerebrovascular Disease Mother         TIA     Blood Disease Mother         high platelets      Hypertension Father      Lipids Father      Hypertension Paternal Grandfather      Hypertension Paternal Grandmother      Neurologic Disorder Paternal Grandmother         Parkinson's     Thyroid Disease Maternal Uncle         thyroid cancer     Neurologic Disorder Other         MS     Multiple Sclerosis Paternal Aunt      Diabetes No family hx of      Glaucoma No family hx of      Macular Degeneration No family hx of          Current Outpatient Medications   Medication Sig Dispense Refill     albuterol (2.5 MG/3ML) 0.083% neb solution Take 1 vial (2.5 mg) by nebulization every 6 hours as needed for shortness of breath / dyspnea or wheezing 50 vial 5     albuterol (PROAIR HFA/PROVENTIL HFA/VENTOLIN HFA) 108 (90 Base) MCG/ACT inhaler Inhale 2-4 puffs into the lungs every 4 hours as needed for shortness of breath / dyspnea or wheezing 1 Inhaler 11     Biotin 2500 MCG CAPS Take 2,500 mcg by mouth       budesonide (PULMICORT) 0.5 MG/2ML neb solution Take 2 mLs (0.5 mg) by nebulization daily 1 Box 5     Calcium Citrate-Vitamin D (CALCIUM + D PO)        Drospirenone-Ethinyl Estradiol (LORYNA PO)        drospirenone-ethinyl estradiol (CAROLA) 3-0.02 MG per tablet Take 1 tablet by mouth daily Active pills only 112 tablet 3     ipratropium - albuterol 0.5 mg/2.5 mg/3 mL (DUONEB) 0.5-2.5 (3) MG/3ML neb solution 1 vial in clinic once       MELATONIN PO        multivitamin, therapeutic with minerals (MULTI-VITAMIN) TABS Take 1 tablet by mouth daily       omega 3 1000 MG CAPS        Probiotic Product (PROBIOTIC DAILY PO)        FIBER PO        BP Readings from Last 3 Encounters:   03/14/19 118/77   01/01/19 131/90   11/07/18 103/61    Wt Readings from Last 3 Encounters:   03/14/19 60.9 kg (134 lb 3.2 oz)   01/01/19 57.2 kg (126 lb)  "  11/07/18 58.8 kg (129 lb 11.2 oz)                    Reviewed and updated as needed this visit by clinical staff  Tobacco  Allergies  Meds  Med Hx  Surg Hx  Fam Hx  Soc Hx      Reviewed and updated as needed this visit by Provider         ROS:  Constitutional, HEENT, cardiovascular, pulmonary, gi and gu systems are negative, except as otherwise noted.    OBJECTIVE:     /77   Pulse 103   Temp 97.8  F (36.6  C) (Oral)   Ht 1.632 m (5' 4.25\")   Wt 60.9 kg (134 lb 3.2 oz)   SpO2 98%   BMI 22.86 kg/m    Body mass index is 22.86 kg/m .  GENERAL: healthy, alert and no distress  EYES: Eyes grossly normal to inspection and fundi benign-no diabetic or hypertensive changes seen  HENT: ear canals and TM's normal, nose and mouth without ulcers or lesions  NECK: no adenopathy, no asymmetry, masses, or scars and thyroid normal to palpation  RESP: lungs clear to auscultation - no rales, rhonchi or wheezes  CV: regular rate and rhythm, normal S1 S2, no S3 or S4, no murmur, click or rub, no peripheral edema and peripheral pulses strong  ABDOMEN: soft, nontender, no hepatosplenomegaly, no masses and bowel sounds normal  MS: no gross musculoskeletal defects noted, no edema,tandem  gait is normal.  SKIN: no suspicious lesions or rashes  NEURO: Normal strength and tone, mentation intact and speech normal,CN II-XII grossly intact, reflexes UE/LE symmetric, no pronator drift.  BACK: no CVA tenderness, no paralumbar tenderness  PSYCH: mentation appears normal, affect normal/bright  LYMPH: normal ant/post cervical, supraclavicular nodes    Diagnostic Test Results:  Results for orders placed or performed in visit on 03/14/19   CBC with platelets   Result Value Ref Range    WBC 10.7 4.0 - 11.0 10e9/L    RBC Count 4.34 3.8 - 5.2 10e12/L    Hemoglobin 12.8 11.7 - 15.7 g/dL    Hematocrit 37.4 35.0 - 47.0 %    MCV 86 78 - 100 fl    MCH 29.5 26.5 - 33.0 pg    MCHC 34.2 31.5 - 36.5 g/dL    RDW 12.3 10.0 - 15.0 %    Platelet Count " "295 150 - 450 10e9/L   Vitamin B12   Result Value Ref Range    Vitamin B12 360 193 - 986 pg/mL   Folate   Result Value Ref Range    Folate 50.1 >5.4 ng/mL   TSH with free T4 reflex   Result Value Ref Range    TSH 1.13 0.40 - 4.00 mU/L   Anti Nuclear Dyana IgG by IFA with Reflex   Result Value Ref Range    ALIZE interpretation Positive (A) NEG^Negative    ALIZE pattern 1 NUCLEOLAR     ALIZE titer 1 1:160    ESR: Erythrocyte sedimentation rate   Result Value Ref Range    Sed Rate 8 0 - 20 mm/h   CRP, inflammation   Result Value Ref Range    CRP Inflammation <2.9 0.0 - 8.0 mg/L   Basic metabolic panel  (Ca, Cl, CO2, Creat, Gluc, K, Na, BUN)   Result Value Ref Range    Sodium 141 133 - 144 mmol/L    Potassium 3.8 3.4 - 5.3 mmol/L    Chloride 109 94 - 109 mmol/L    Carbon Dioxide 25 20 - 32 mmol/L    Anion Gap 7 3 - 14 mmol/L    Glucose 105 (H) 70 - 99 mg/dL    Urea Nitrogen 18 7 - 30 mg/dL    Creatinine 0.80 0.52 - 1.04 mg/dL    GFR Estimate >90 >60 mL/min/[1.73_m2]    GFR Estimate If Black >90 >60 mL/min/[1.73_m2]    Calcium 8.9 8.5 - 10.1 mg/dL       ASSESSMENT/PLAN:           BMI:   Estimated body mass index is 22.86 kg/m  as calculated from the following:    Height as of this encounter: 1.632 m (5' 4.25\").    Weight as of this encounter: 60.9 kg (134 lb 3.2 oz).         1. Fatigue, unspecified type  Referring to Rheumatology for + ANA.  - CBC with platelets  - Vitamin B12  - Folate  - TSH with free T4 reflex  - Basic metabolic panel  (Ca, Cl, CO2, Creat, Gluc, K, Na, BUN)  - RHEUMATOLOGY REFERRAL    2. Paresthesias  + ALIZE- referring to Rheumatology  - Anti Nuclear Dyana IgG by IFA with Reflex  - ESR: Erythrocyte sedimentation rate  - CRP, inflammation  - Basic metabolic panel  (Ca, Cl, CO2, Creat, Gluc, K, Na, BUN)  - RHEUMATOLOGY REFERRAL    3. Balance problems  Referring to Rheumatology.    - RHEUMATOLOGY REFERRAL    4. Mild intermittent asthma, uncomplicated  ACT=23.  Well controlled.    5. Need for TDaP " berta  -TDaP  -      ADMIN VACCINE, FIRST    See Patient Instructions    CODY Qiu [facility]

## 2019-03-14 NOTE — PATIENT INSTRUCTIONS
"At [facility], we strive to deliver an exceptional experience to you, every time we see you.  If you receive a survey in the mail, please send us back your thoughts. We really do value your feedback.    Your care team:                            Family Medicine Internal Medicine   MD Jamaal Brothers MD Shantel Branch-Fleming, MD Katya Georgiev PA-C Megan Hill, APRN [facility]    Miles Cervantes, MD Pediatrics   Sheldon Meléndez, ROBY Caro, MD Chantell Grant APRN CNP   MD Diane Millan MD Deborah Mielke, MD Christina Daly, APRN [facility]      Clinic hours: Monday - Thursday 7 am-7 pm; Fridays 7 am-5 pm.   Urgent care: Monday - Friday 11 am-9 pm; Saturday and Sunday 9 am-5 pm.  Pharmacy : Monday -Thursday 8 am-8 pm; Friday 8 am-6 pm; Saturday and Sunday 9 am-5 pm.     Clinic: [phone]   Pharmacy: [phone]        Patient Education     Paraesthesias  Paraesthesia is a burning or prickling sensation that is sometimes felt in the hands, arms, legs or feet. It can also occur in other parts of the body. It can also feel like tingling or numbness, skin crawling, or itching. The feeling is not comfortable, but it is not painful. (The \"pins and needles\" feeling that happens when a foot or hand \"falls asleep\" is a temporary paraesthesia.)  Paraesthesias that last or come and go may be caused by medical issues that need to be treated. These include stroke, a bulging disk pressing on a nerve, a trapped nerve, vitamin deficiencies, uncontrolled diabetes, alcohol abuse, or even certain medicines.  Tests are often done. These tests may include blood tests, X-ray, CT (computerized tomography) scan, nerve conduction studies (NCS), or a muscle test (electromyography). Depending on the cause, treatment may include physical therapy.  Home care    Tell your healthcare provider about all medicines you take. This includes prescription and over-the-counter medicines, " vitamins, and herbs. Ask if any of the medicines may be causing your problems. Don't make any changes to prescription medicines without talking to your healthcare provider first.    You may be prescribed medicines to help relieve the tingling feeling or for pain. Take all medicines as directed.    A numb hand or foot may be more prone to injury. To help protect it:  ? Always use oven mitts.  ? Test water with an unaffected hand or foot.  ? Use caution when trimming nails. File sharp areas.  ? Wear shoes that fit well to avoid pressure points, blisters, and ulcers.  ? Inspect your hands and feet carefully (including the soles of your feet and between your toes) daily. If you see red areas, sores, or other problems, tell your healthcare provider.  Follow-up care  Follow up with your doctor, or as advised. You may need further testing or evaluation.     When to seek medical advice  Call your healthcare provider right away if any of the following occur:    Numbness or weakness of the face, one arm, or one leg    Slurred speech, confusion, trouble speaking, walking, or seeing    Severe headache, fainting spell, dizziness, or seizure    Chest, arm, neck, or upper back pain    Loss of bladder or bowel control    Open wound with redness, swelling, or pus         Date Last Reviewed: 4/1/2018 2000-2018 [employer]. [address] [place], [place] [id number]. All rights reserved. This information is not intended as a substitute for professional medical care. Always follow your healthcare professional's instructions.           Patient Education     Weakness with Uncertain Cause  Based on your exam today, the exact cause of your weakness is not certain. But your weakness does not seem to be a sign of a serious illness at this time. Keep an eye on your symptoms and get medical advice as instructed below.  Home care    Rest at home today. Don't over-exert yourself.    Take any medicine as prescribed.    For  the next few days, drink extra fluids (unless your healthcare provider wants you to restrict fluids for other reasons). Don't skip meals.    Unless otherwise directed, continue to take any prescription medicines.    Contact your healthcare provider if you have any questions or concerns.  Follow-up care  Follow up with your healthcare provider, or as advised.  When to seek medical advice  Call your healthcare provider right away for any of the following:    Symptoms get worse    Symptoms don't start getting better within 2 days    Fever of 100.4  F (38  C) or higher, or as directed by your healthcare provider  Call 911  Call 911 for any of these:    Chest, arm, neck, jaw, or upper back pain    Trouble breathing    Numbness or weakness of the face, one arm, or one leg    Slurred speech, confusion, or trouble speaking, walking, or seeing    Blood in vomit or stool (black or red color)    Loss of consciousness    Severe headache  Date Last Reviewed: 10/1/2017    5737-9936 [employer] [address] [place] [address], [place] [address]. All rights reserved. This information is not intended as a substitute for professional medical care. Always follow your healthcare professional's instructions.

## 2019-03-15 LAB
ANA PAT SER IF-IMP: ABNORMAL
ANA SER QL IF: POSITIVE
ANA TITR SER IF: ABNORMAL {TITER}

## 2019-03-16 ASSESSMENT — ASTHMA QUESTIONNAIRES: ACT_TOTALSCORE: 23

## 2019-03-17 ENCOUNTER — MYC MEDICAL ADVICE (OUTPATIENT)
Dept: FAMILY MEDICINE | Facility: CLINIC | Age: 34
End: 2019-03-17

## 2019-03-18 ENCOUNTER — OFFICE VISIT (OUTPATIENT)
Dept: FAMILY MEDICINE | Facility: CLINIC | Age: 34
End: 2019-03-18
Payer: COMMERCIAL

## 2019-03-18 ENCOUNTER — TELEPHONE (OUTPATIENT)
Dept: FAMILY MEDICINE | Facility: CLINIC | Age: 34
End: 2019-03-18

## 2019-03-18 VITALS
SYSTOLIC BLOOD PRESSURE: 123 MMHG | OXYGEN SATURATION: 100 % | TEMPERATURE: 98.6 F | HEIGHT: 65 IN | WEIGHT: 134.8 LBS | BODY MASS INDEX: 22.46 KG/M2 | DIASTOLIC BLOOD PRESSURE: 70 MMHG | HEART RATE: 68 BPM | RESPIRATION RATE: 18 BRPM

## 2019-03-18 DIAGNOSIS — Z23 NEED FOR PROPHYLACTIC VACCINATION WITH TETANUS-DIPHTHERIA (TD): ICD-10-CM

## 2019-03-18 DIAGNOSIS — R20.2 PARESTHESIA OF BOTH HANDS: ICD-10-CM

## 2019-03-18 DIAGNOSIS — R26.89 BALANCE PROBLEM: Primary | ICD-10-CM

## 2019-03-18 PROBLEM — C43.9 SKIN CANCER (MELANOMA) (H): Status: ACTIVE | Noted: 2018-06-05

## 2019-03-18 PROCEDURE — 99214 OFFICE O/P EST MOD 30 MIN: CPT | Performed by: PHYSICIAN ASSISTANT

## 2019-03-18 ASSESSMENT — MIFFLIN-ST. JEOR: SCORE: 1309.39

## 2019-03-18 ASSESSMENT — PAIN SCALES - GENERAL: PAINLEVEL: NO PAIN (0)

## 2019-03-18 NOTE — LETTER
My Asthma Action Plan  Name: Cuca Jung   YOB: 1985  Date: 3/18/2019   My doctor: Dodie Victoria PA-C   My clinic: [facility]        My Control Medicine: { :694858}  My Rescue Medicine: { :226217}  {AAP include Oral Steroid:155443} My Asthma Severity: { :322670}  Avoid your asthma triggers: { :091591}        {Is patient a child or adult?:049780}       GREEN ZONE   Good Control    I feel good    No cough or wheeze    Can work, sleep and play without asthma symptoms       Take your asthma control medicine every day.     1. If exercise triggers your asthma, take your rescue medication    15 minutes before exercise or sports, and    During exercise if you have asthma symptoms  2. Spacer to use with inhaler: If you have a spacer, make sure to use it with your inhaler             YELLOW ZONE Getting Worse  I have ANY of these:    I do not feel good    Cough or wheeze    Chest feels tight    Wake up at night   1. Keep taking your Green Zone medications  2. Start taking your rescue medicine:    every 20 minutes for up to 1 hour. Then every 4 hours for 24-48 hours.  3. If you stay in the Yellow Zone for more than 12-24 hours, contact your doctor.  4. If you do not return to the Green Zone in 12-24 hours or you get worse, start taking your oral steroid medicine if prescribed by your provider.           RED ZONE Medical Alert - Get Help  I have ANY of these:    I feel awful    Medicine is not helping    Breathing getting harder    Trouble walking or talking    Nose opens wide to breathe       1. Take your rescue medicine NOW  2. If your provider has prescribed an oral steroid medicine, start taking it NOW  3. Call your doctor NOW  4. If you are still in the Red Zone after 20 minutes and you have not reached your doctor:    Take your rescue medicine again and    Call 911 or go to the emergency room right away    See your regular doctor within 2 weeks of an Emergency Room  or Urgent Care visit for follow-up treatment.          Annual Reminders:  Meet with Asthma Educator,  Flu Shot in the Fall, consider Pneumonia Vaccination for patients with asthma (aged 19 and older).    Pharmacy:    [id number] - [place], [place] - [address] AT [facility] [address]  [id number] - [place], [place] - [address] [facility] [address]  [facility] - [place], [place] - [address]  [facility] - [place], [place] - [address] [facility]                      Asthma Triggers  How To Control Things That Make Your Asthma Worse    Triggers are things that make your asthma worse.  Look at the list below to help you find your triggers and what you can do about them.  You can help prevent asthma flare-ups by staying away from your triggers.      Trigger                                                          What you can do   Cigarette Smoke  Tobacco smoke can make asthma worse. Do not allow smoking in your home, car or around you.  Be sure no one smokes at a child s day care or school.  If you smoke, ask your health care provider for ways to help you quit.  Ask family members to quit too.  Ask your health care provider for a referral to Quit Plan to help you quit smoking, or call 2-347-768-PLAN.     Colds, Flu, Bronchitis  These are common triggers of asthma. Wash your hands often.  Don t touch your eyes, nose or mouth.  Get a flu shot every year.     Dust Mites  These are tiny bugs that live in cloth or carpet. They are too small to see. Wash sheets and blankets in hot water every week.   Encase pillows and mattress in dust mite proof covers.  Avoid having carpet if you can. If you have carpet, vacuum weekly.   Use a dust mask and HEPA vacuum.   Pollen and Outdoor Mold  Some people are allergic to trees, grass, or weed pollen, or molds. Try to keep your windows closed.  Limit time out doors when pollen count is high.   Ask you health care provider about  taking medicine during allergy season.     Animal Dander  Some people are allergic to skin flakes, urine or saliva from pets with fur or feathers. Keep pets with fur or feathers out of your home.    If you can t keep the pet outdoors, then keep the pet out of your bedroom.  Keep the bedroom door closed.  Keep pets off cloth furniture and away from stuffed toys.     Mice, Rats, and Cockroaches  Some people are allergic to the waste from these pests.   Cover food and garbage.  Clean up spills and food crumbs.  Store grease in the refrigerator.   Keep food out of the bedroom.   Indoor Mold  This can be a trigger if your home has high moisture. Fix leaking faucets, pipes, or other sources of water.   Clean moldy surfaces.  Dehumidify basement if it is damp and smelly.   Smoke, Strong Odors, and Sprays  These can reduce air quality. Stay away from strong odors and sprays, such as perfume, powder, hair spray, paints, smoke incense, paint, cleaning products, candles and new carpet.   Exercise or Sports  Some people with asthma have this trigger. Be active!  Ask your doctor about taking medicine before sports or exercise to prevent symptoms.    Warm up for 5-10 minutes before and after sports or exercise.     Other Triggers of Asthma  Cold air:  Cover your nose and mouth with a scarf.  Sometimes laughing or crying can be a trigger.  Some medicines and food can trigger asthma.

## 2019-03-18 NOTE — PROGRESS NOTES
"  SUBJECTIVE:   Cuca Jung is a 33 year old female who presents to clinic today for the following health issues:    Concern - off balance, tingling, weakness  Onset: onset 3 weeks ago    Description:     Patient states that her hands and fingers have been numb since 10 this morning. Patient states that before it was on and off. Patient also states that she feels more off balance -f.e when walking on treadmill she shift side to side and can't stay straight on it..Patient also states she feels \" fatigued and off\" in the entire body.     Intensity: severe    Progression of Symptoms:  Slowly worsening    Therapies Tried and outcome: None, was referred to rheumatology but haven't made an appointment.     Problem list and histories reviewed & adjusted, as indicated.  Additional history: as documented    Patient Active Problem List   Diagnosis     Encounter for surveillance of other contraceptive     Family history of malignant neoplasm of breast     CARDIOVASCULAR SCREENING; LDL GOAL LESS THAN 160     Mild intermittent asthma, uncomplicated     Family history of malignant neoplasm of breast     Dysplasia of cervix, low grade (MERRY 1)     Skin cancer (melanoma) (H)     Past Surgical History:   Procedure Laterality Date     HC CREATE EARDRUM OPENING,GEN ANESTH  1991    P.E. Tubes     PE TUBES  age 2     skin cancer Left 06/2018    maligant melenoma     TONSILLECTOMY  age 23       Social History     Tobacco Use     Smoking status: Never Smoker     Smokeless tobacco: Never Used   Substance Use Topics     Alcohol use: Yes     Alcohol/week: 0.0 oz     Comment: once per month     Family History   Problem Relation Age of Onset     Breast Cancer Mother 40        Mastectomy in Early 40s     Hypertension Mother      Cerebrovascular Disease Mother         TIA     Blood Disease Mother         high platelets      Hypertension Father      Lipids Father      Hypertension Paternal Grandfather      Hypertension Paternal Grandmother  "     Neurologic Disorder Paternal Grandmother         Parkinson's     Thyroid Disease Maternal Uncle         thyroid cancer     Neurologic Disorder Other         MS     Multiple Sclerosis Paternal Aunt      Diabetes No family hx of      Glaucoma No family hx of      Macular Degeneration No family hx of          Current Outpatient Medications   Medication Sig Dispense Refill     albuterol (2.5 MG/3ML) 0.083% neb solution Take 1 vial (2.5 mg) by nebulization every 6 hours as needed for shortness of breath / dyspnea or wheezing 50 vial 5     albuterol (PROAIR HFA/PROVENTIL HFA/VENTOLIN HFA) 108 (90 Base) MCG/ACT inhaler Inhale 2-4 puffs into the lungs every 4 hours as needed for shortness of breath / dyspnea or wheezing 1 Inhaler 11     Biotin 2500 MCG CAPS Take 2,500 mcg by mouth       budesonide (PULMICORT) 0.5 MG/2ML neb solution Take 2 mLs (0.5 mg) by nebulization daily 1 Box 5     Calcium Citrate-Vitamin D (CALCIUM + D PO)        Drospirenone-Ethinyl Estradiol (LORYNA PO)        drospirenone-ethinyl estradiol (CAROLA) 3-0.02 MG per tablet Take 1 tablet by mouth daily Active pills only 112 tablet 3     FIBER PO        ipratropium - albuterol 0.5 mg/2.5 mg/3 mL (DUONEB) 0.5-2.5 (3) MG/3ML neb solution 1 vial in clinic once       MELATONIN PO        multivitamin, therapeutic with minerals (MULTI-VITAMIN) TABS Take 1 tablet by mouth daily       omega 3 1000 MG CAPS        order for DME Equipment being ordered: bilateral short wrist braces, no thumb 2 Device 0     Probiotic Product (PROBIOTIC DAILY PO)        Allergies   Allergen Reactions     Seasonal Allergies      Staples      metals     Adhesive Tape Rash     Nickel Rash       Reviewed and updated as needed this visit by clinical staff  Tobacco  Allergies  Meds  Problems  Med Hx  Surg Hx  Fam Hx  Soc Hx        Reviewed and updated as needed this visit by Provider  Tobacco  Allergies  Meds  Problems  Med Hx  Surg Hx  Fam Hx         ROS:  Constitutional,  "HEENT, cardiovascular, pulmonary, gi and gu systems are negative, except as otherwise noted.    OBJECTIVE:     /70 (BP Location: Right arm, Patient Position: Sitting, Cuff Size: Adult Regular)   Pulse 68   Temp 98.6  F (37  C) (Oral)   Resp 18   Ht 1.638 m (5' 4.5\")   Wt 61.1 kg (134 lb 12.8 oz)   SpO2 100%   Breastfeeding? No   BMI 22.78 kg/m    Body mass index is 22.78 kg/m .  GENERAL: healthy, alert and no distress  EYES: Eyes grossly normal to inspection, PERRL and conjunctivae and sclerae normal  HENT: ear canals and TM's normal, nose and mouth without ulcers or lesions  NECK: no adenopathy, no asymmetry, masses, or scars and thyroid normal to palpation  RESP: lungs clear to auscultation - no rales, rhonchi or wheezes  CV: regular rate and rhythm, normal S1 S2, no S3 or S4, no murmur, click or rub, no peripheral edema and peripheral pulses strong  ABDOMEN: soft, nontender, no hepatosplenomegaly, no masses and bowel sounds normal  MS: no gross musculoskeletal defects noted, no edema  NEURO: Normal strength and tone, sensory exam grossly normal, mentation intact, oriented times 3, speech normal, cranial nerves 2-12 intact, DTR's normal and symmetric +2 and gait normal including heel/toe/tandem walking    Diagnostic Test Results:  Results for orders placed or performed in visit on 03/14/19   CBC with platelets   Result Value Ref Range    WBC 10.7 4.0 - 11.0 10e9/L    RBC Count 4.34 3.8 - 5.2 10e12/L    Hemoglobin 12.8 11.7 - 15.7 g/dL    Hematocrit 37.4 35.0 - 47.0 %    MCV 86 78 - 100 fl    MCH 29.5 26.5 - 33.0 pg    MCHC 34.2 31.5 - 36.5 g/dL    RDW 12.3 10.0 - 15.0 %    Platelet Count 295 150 - 450 10e9/L   Vitamin B12   Result Value Ref Range    Vitamin B12 360 193 - 986 pg/mL   Folate   Result Value Ref Range    Folate 50.1 >5.4 ng/mL   TSH with free T4 reflex   Result Value Ref Range    TSH 1.13 0.40 - 4.00 mU/L   Anti Nuclear Dyana IgG by IFA with Reflex   Result Value Ref Range    ALIZE " interpretation Positive (A) NEG^Negative    ALIZE pattern 1 NUCLEOLAR     ALIZE titer 1 1:160    ESR: Erythrocyte sedimentation rate   Result Value Ref Range    Sed Rate 8 0 - 20 mm/h   CRP, inflammation   Result Value Ref Range    CRP Inflammation <2.9 0.0 - 8.0 mg/L   Basic metabolic panel  (Ca, Cl, CO2, Creat, Gluc, K, Na, BUN)   Result Value Ref Range    Sodium 141 133 - 144 mmol/L    Potassium 3.8 3.4 - 5.3 mmol/L    Chloride 109 94 - 109 mmol/L    Carbon Dioxide 25 20 - 32 mmol/L    Anion Gap 7 3 - 14 mmol/L    Glucose 105 (H) 70 - 99 mg/dL    Urea Nitrogen 18 7 - 30 mg/dL    Creatinine 0.80 0.52 - 1.04 mg/dL    GFR Estimate >90 >60 mL/min/[1.73_m2]    GFR Estimate If Black >90 >60 mL/min/[1.73_m2]    Calcium 8.9 8.5 - 10.1 mg/dL       ASSESSMENT/PLAN:       ICD-10-CM    1. Balance problem R26.89 MR Brain w/o Contrast     NEUROLOGY ADULT REFERRAL   2. Paresthesia of both hands R20.2 MR Brain w/o Contrast     NEUROLOGY ADULT REFERRAL     order for DME   3. Need for prophylactic vaccination with tetanus-diphtheria (Td) Z23    neuro exam is normal, no acute findings such as focal neurological weakness, speech or vision problem that would warrant emergency intervention.   1,2.  Patient will schedule MRI of the brain to rule out mass/plaques  Wear braces to see if it helps at night and during the day with tingling-possible carpal tunnel  Make appointment with neurology for consult   If symptom worse or develop focal weakness, speech, vision issues go to ED immediately.     Dodie Victoria PA-C  [facility]

## 2019-03-18 NOTE — TELEPHONE ENCOUNTER
Please see other telephone encounter for this same reason on 3/18/19. Call ended up being transferred instead of being taken off park.  Stefany Tobar RN

## 2019-03-18 NOTE — TELEPHONE ENCOUNTER
Reason for call:  Patient reporting a symptom    Symptom or request: difficulty walking, hands and fingers are numb    Duration (how long have symptoms been present): several weeks , worse today    Have you been treated for this before? Yes    Additional comments: sending to triage - call park call  [phone]    Phone Number patient can be reached at:  Cell number on file:    Telephone Information:   Mobile [phone]     Best Time:  any    Can we leave a detailed message on this number:  YES    Call taken on 3/18/2019 at 12:14 PM by Sonya Hennessy

## 2019-03-26 ENCOUNTER — TRANSFERRED RECORDS (OUTPATIENT)
Dept: HEALTH INFORMATION MANAGEMENT | Facility: CLINIC | Age: 34
End: 2019-03-26

## 2019-03-29 ENCOUNTER — TRANSFERRED RECORDS (OUTPATIENT)
Dept: HEALTH INFORMATION MANAGEMENT | Facility: CLINIC | Age: 34
End: 2019-03-29

## 2019-03-29 NOTE — TELEPHONE ENCOUNTER
You can call to schedule your appointment but check to be sure that  it is covered by your insurance.  Thanks,    Natty ROSS, CNP

## 2019-04-05 ENCOUNTER — OFFICE VISIT (OUTPATIENT)
Dept: FAMILY MEDICINE | Facility: CLINIC | Age: 34
End: 2019-04-05
Payer: COMMERCIAL

## 2019-04-05 VITALS
HEIGHT: 65 IN | DIASTOLIC BLOOD PRESSURE: 77 MMHG | HEART RATE: 77 BPM | WEIGHT: 131 LBS | OXYGEN SATURATION: 97 % | TEMPERATURE: 98.3 F | BODY MASS INDEX: 21.83 KG/M2 | RESPIRATION RATE: 12 BRPM | SYSTOLIC BLOOD PRESSURE: 111 MMHG

## 2019-04-05 DIAGNOSIS — Z01.818 PREOP GENERAL PHYSICAL EXAM: Primary | ICD-10-CM

## 2019-04-05 PROCEDURE — 99214 OFFICE O/P EST MOD 30 MIN: CPT | Performed by: FAMILY MEDICINE

## 2019-04-05 RX ORDER — GABAPENTIN 100 MG/1
100 CAPSULE ORAL 2 TIMES DAILY
COMMUNITY
End: 2019-12-02

## 2019-04-05 RX ORDER — BACLOFEN 5 MG/1
5 TABLET ORAL 3 TIMES DAILY
COMMUNITY
End: 2019-05-02

## 2019-04-05 ASSESSMENT — MIFFLIN-ST. JEOR: SCORE: 1292.15

## 2019-04-05 ASSESSMENT — PAIN SCALES - GENERAL: PAINLEVEL: MODERATE PAIN (4)

## 2019-04-05 NOTE — PATIENT INSTRUCTIONS
Before Your Surgery      Call your surgeon if there is any change in your health. This includes signs of a cold or flu (such as a sore throat, runny nose, cough, rash or fever).    Do not smoke, drink alcohol or take over the counter medicine (unless your surgeon or primary care doctor tells you to) for the 24 hours before and after surgery.    If you take prescribed drugs: Follow your doctor s orders about which medicines to take and which to stop until after surgery.    Eating and drinking prior to surgery: follow the instructions from your surgeon    Take a shower or bath the night before surgery. Use the soap your surgeon gave you to gently clean your skin. If you do not have soap from your surgeon, use your regular soap. Do not shave or scrub the surgery site.  Wear clean pajamas and have clean sheets on your bed.     At [facility], we strive to deliver an exceptional experience to you, every time we see you.  If you receive a survey in the mail, please send us back your thoughts. We really do value your feedback.    Based on your medical history, these are the current health maintenance/preventive care services that you are due for (some may have been done at this visit.)  Health Maintenance Due   Topic Date Due     ASTHMA ACTION PLAN Q1 YR  12/01/1990     EYE EXAM Q1 YEAR  01/15/2017     DTAP/TDAP/TD IMMUNIZATION (8 - Td) 12/04/2017     INFLUENZA VACCINE (1) 09/01/2018         Suggested websites for health information:  Www.[facility]org : Up to date and easily searchable information on multiple topics.  Www.medlineplus.gov : medication info, interactive tutorials, watch real surgeries online  Www.familydoctor.org : good info from the Academy of Family Physicians  Www.cdc.gov : public health info, travel advisories, epidemics (H1N1)  Www.aap.org : children's health info, normal development, vaccinations  Www.health.state.[place].us : [place] dept of health, public health issues in [place],  N1N1    Your care team:                            Family Medicine Internal Medicine   MD Jamaal Brothers MD Shantel Branch-Fleming, MD Katya Georgiev PA-C Nam Ho, MD Pediatrics   ROBY Spears, MD Diane Bravo CNP, MD Deborah Mielke, MD Kim Thein, APRN CNP      Clinic hours: Monday - Thursday 7 am-7 pm; Fridays 7 am-5 pm.   Urgent care: Monday - Friday 11 am-9 pm; Saturday and Sunday 9 am-5 pm.  Pharmacy : Monday -Thursday 8 am-8 pm; Friday 8 am-6 pm; Saturday and Sunday 9 am-5 pm.     Clinic: [phone]   Pharmacy: [phone]

## 2019-04-05 NOTE — PROGRESS NOTES
[address] [address]  [phone]  Dept: [phone]    PRE-OP EVALUATION:  Today's date: 2019    Cuca Jung (: 1985) presents for pre-operative evaluation assessment as requested by Dr. Culp.  She requires evaluation and anesthesia risk assessment prior to undergoing surgery/procedure.    Proposed Surgery/ Procedure: C5-6 anterior cervical disc arthroplasty  Date of Surgery/ Procedure: 19  Time of Surgery/ Procedure: 3:20 pm  Hospital/Surgical Facility: [facility]  Fax number for surgical facility: [phone]  Primary Physician: No Ref-Primary, Physician  Type of Anesthesia Anticipated: General    Patient has a Health Care Directive or Living Will:  NO    1. NO - Do you have a history of heart attack, stroke, stent, bypass or surgery on an artery in the head, neck, heart or legs?  2. NO - Do you ever have any pain or discomfort in your chest?  3. NO - Do you have a history of  Heart Failure?  4. NO - Are you troubled by shortness of breath when: walking on the level, up a slight hill or at night?  5. NO - Do you currently have a cold, bronchitis or other respiratory infection?  6. NO - Do you have a cough, shortness of breath or wheezing?  7. NO - Do you sometimes get pains in the calves of your legs when you walk?  8. YES - DO YOU OR ANYONE IN YOUR FAMILY HAVE PREVIOUS HISTORY OF BLOOD CLOTS?   9. NO - Do you or does anyone in your family have a serious bleeding problem such as prolonged bleeding following surgeries or cuts?  10. NO - Have you ever had problems with anemia or been told to take iron pills?  11. NO - Have you had any abnormal blood loss such as black, tarry or bloody stools, or abnormal vaginal bleeding?  12. NO - Have you ever had a blood transfusion?  13. NO - Have you or any of your relatives ever had problems with anesthesia?  14. NO - Do you have sleep apnea, excessive snoring or daytime  drowsiness?  15. NO - Do you have any prosthetic heart valves?  16. NO - Do you have prosthetic joints?  17. NO - Is there any chance that you may be pregnant?      HPI:     HPI related to upcoming procedure: h/o cervical radiculopathy      See problem list for active medical problems.  Problems all longstanding and stable, except as noted/documented.  See ROS for pertinent symptoms related to these conditions.                                                                                                                                                          .    MEDICAL HISTORY:     Patient Active Problem List    Diagnosis Date Noted     Skin cancer (melanoma) (H) 06/05/2018     Priority: Medium     Dysplasia of cervix, low grade (MERRY 1) 08/25/2017     Priority: Medium     7/06 LSIL pap -- [place] Bx: MERRY 1  5/07 NIL pap  5/08 LSIL pap   6/08 [place] ECC: neg  1/09 ASCUS pap, + HR HPV (type unknown)   7/09, 9/11, 1/13, 7/14 - all NIL paps  8/25/17 ASCUS pap, + HR HPV (not 16/18). Plan colp due by 11/25/17.  10/13/17: [place] Bx atypical squamous epithelium of undetermined significance. Plan cotest in 1 year.  11/07/18: Ascus pap,+ HR HPV (not 16 or 18) result. Plan cotest in 1 year per provider.        Family history of malignant neoplasm of breast 08/11/2016     Priority: Medium     Mother age 40       Mild intermittent asthma, uncomplicated      Priority: Medium     exercise or smoke brings it on       CARDIOVASCULAR SCREENING; LDL GOAL LESS THAN 160 10/31/2010     Priority: Medium     Family history of malignant neoplasm of breast 07/19/2005     Priority: Medium     Encounter for surveillance of other contraceptive 02/18/2004     Priority: Medium     Diagnosis updated by automated process. Provider to review and confirm.        Past Medical History:   Diagnosis Date     ASCUS with positive high risk HPV cervical 1/15/09, 08/25/2017, 11/07/18    type unknown; not 16/18. 11/07/18: See problem list.      Borderline high  cholesterol      History of colposcopy 7/19/2006, 06/10/2008    2006 - MERRY 1, 2008 - ECC: neg     Mild intermittent asthma, uncomplicated     exercise or smoke brings it on     Papanicolaou smear of cervix with low grade squamous intraepithelial lesion (LGSIL) 7/12/2006, 05/13/2008     Past Surgical History:   Procedure Laterality Date     HC CREATE EARDRUM OPENING,GEN ANESTH  1991    P.E. Tubes     PE TUBES  age 2     skin cancer Left 06/2018    maligant melenoma     TONSILLECTOMY  age 23     Current Outpatient Medications   Medication Sig Dispense Refill     albuterol (2.5 MG/3ML) 0.083% neb solution Take 1 vial (2.5 mg) by nebulization every 6 hours as needed for shortness of breath / dyspnea or wheezing 50 vial 5     albuterol (PROAIR HFA/PROVENTIL HFA/VENTOLIN HFA) 108 (90 Base) MCG/ACT inhaler Inhale 2-4 puffs into the lungs every 4 hours as needed for shortness of breath / dyspnea or wheezing 1 Inhaler 11     Biotin 2500 MCG CAPS Take 2,500 mcg by mouth       budesonide (PULMICORT) 0.5 MG/2ML neb solution Take 2 mLs (0.5 mg) by nebulization daily 1 Box 5     Calcium Citrate-Vitamin D (CALCIUM + D PO)        Drospirenone-Ethinyl Estradiol (LORYNA PO)        drospirenone-ethinyl estradiol (CAROLA) 3-0.02 MG per tablet Take 1 tablet by mouth daily Active pills only 112 tablet 3     MELATONIN PO        multivitamin, therapeutic with minerals (MULTI-VITAMIN) TABS Take 1 tablet by mouth daily       omega 3 1000 MG CAPS        Probiotic Product (PROBIOTIC DAILY PO)        OTC products: no recent use of OTC ASA, NSAIDS or Steroids    Allergies   Allergen Reactions     Seasonal Allergies      Staples      metals     Adhesive Tape Rash     Nickel Rash      Latex Allergy: NO    Social History     Tobacco Use     Smoking status: Never Smoker     Smokeless tobacco: Never Used   Substance Use Topics     Alcohol use: Yes     Alcohol/week: 0.0 oz     Comment: once per month     History   Drug Use No       REVIEW OF SYSTEMS:  "  CONSTITUTIONAL: NEGATIVE for fever, chills, change in weight  ENT/MOUTH: NEGATIVE for ear, mouth and throat problems  RESP: NEGATIVE for significant cough or SOB  CV: NEGATIVE for chest pain, palpitations or peripheral edema    EXAM:   /77 (BP Location: Left arm, Patient Position: Chair, Cuff Size: Adult Regular)   Pulse 77   Temp 98.3  F (36.8  C) (Oral)   Resp 12   Ht 1.638 m (5' 4.5\")   Wt 59.4 kg (131 lb)   LMP  (LMP Unknown)   SpO2 97%   BMI 22.14 kg/m    GENERAL APPEARANCE: healthy, alert and no distress  HENT: ear canals and TM's normal and nose and mouth without ulcers or lesions  RESP: lungs clear to auscultation - no rales, rhonchi or wheezes  CV: regular rate and rhythm, normal S1 S2, no S3 or S4 and no murmur, click or rub   ABDOMEN: soft, nontender, no HSM or masses and bowel sounds normal  NEURO: Normal strength and tone, sensory exam grossly normal, mentation intact and speech normal    DIAGNOSTICS:       Recent Labs   Lab Test 03/14/19  1415 11/14/18  0756   HGB 12.8 11.4*    266    140   POTASSIUM 3.8 3.5   CR 0.80 0.82      Last Comprehensive Metabolic Panel:  Sodium   Date Value Ref Range Status   03/14/2019 141 133 - 144 mmol/L Final     Potassium   Date Value Ref Range Status   03/14/2019 3.8 3.4 - 5.3 mmol/L Final     Chloride   Date Value Ref Range Status   03/14/2019 109 94 - 109 mmol/L Final     Carbon Dioxide   Date Value Ref Range Status   03/14/2019 25 20 - 32 mmol/L Final     Anion Gap   Date Value Ref Range Status   03/14/2019 7 3 - 14 mmol/L Final     Glucose   Date Value Ref Range Status   03/14/2019 105 (H) 70 - 99 mg/dL Final     Comment:     Non Fasting     Urea Nitrogen   Date Value Ref Range Status   03/14/2019 18 7 - 30 mg/dL Final     Creatinine   Date Value Ref Range Status   03/14/2019 0.80 0.52 - 1.04 mg/dL Final     GFR Estimate   Date Value Ref Range Status   03/14/2019 >90 >60 mL/min/[1.73_m2] Final     Comment:     Non  GFR " Calc  Starting 12/18/2018, serum creatinine based estimated GFR (eGFR) will be   calculated using the Chronic Kidney Disease Epidemiology Collaboration   (CKD-EPI) equation.       Calcium   Date Value Ref Range Status   03/14/2019 8.9 8.5 - 10.1 mg/dL Final         IMPRESSION:   Reason for surgery/procedure: C5-6 anterior cervical disc arthroplasty  Diagnosis/reason for consult: preop clearance    The proposed surgical procedure is considered INTERMEDIATE risk.    REVISED CARDIAC RISK INDEX  The patient has the following serious cardiovascular risks for perioperative complications such as (MI, PE, VFib and 3  AV Block):  No serious cardiac risks  INTERPRETATION: 0 risks: Class I (very low risk - 0.4% complication rate)    The patient has the following additional risks for perioperative complications:  No identified additional risks      ICD-10-CM    1. Preop general physical exam Z01.818        RECOMMENDATIONS:       --Patient is to take all scheduled medications on the day of surgery EXCEPT for modifications listed below.    APPROVAL GIVEN to proceed with proposed procedure, without further diagnostic evaluation       Signed Electronically by: Miles Cervantes MD    Copy of this evaluation report is provided to requesting physician.    [facility] Preop Guidelines    Revised Cardiac Risk Index

## 2019-04-09 ENCOUNTER — ANESTHESIA (OUTPATIENT)
Dept: SURGERY | Facility: CLINIC | Age: 34
End: 2019-04-09
Payer: COMMERCIAL

## 2019-04-09 ENCOUNTER — HOSPITAL ENCOUNTER (OUTPATIENT)
Facility: CLINIC | Age: 34
Discharge: HOME | End: 2019-04-10
Attending: ORTHOPAEDIC SURGERY | Admitting: ORTHOPAEDIC SURGERY
Payer: COMMERCIAL

## 2019-04-09 ENCOUNTER — APPOINTMENT (OUTPATIENT)
Dept: GENERAL RADIOLOGY | Facility: CLINIC | Age: 34
End: 2019-04-09
Attending: ORTHOPAEDIC SURGERY
Payer: COMMERCIAL

## 2019-04-09 ENCOUNTER — ANESTHESIA EVENT (OUTPATIENT)
Dept: SURGERY | Facility: CLINIC | Age: 34
End: 2019-04-09
Payer: COMMERCIAL

## 2019-04-09 DIAGNOSIS — Z98.890 S/P CERVICAL DISC REPLACEMENT: Primary | ICD-10-CM

## 2019-04-09 LAB — HCG UR QL: NEGATIVE

## 2019-04-09 PROCEDURE — 27210794 ZZH OR GENERAL SUPPLY STERILE: Performed by: ORTHOPAEDIC SURGERY

## 2019-04-09 PROCEDURE — 25000125 ZZHC RX 250: Performed by: ORTHOPAEDIC SURGERY

## 2019-04-09 PROCEDURE — 25000128 H RX IP 250 OP 636: Performed by: ORTHOPAEDIC SURGERY

## 2019-04-09 PROCEDURE — 71000012 ZZH RECOVERY PHASE 1 LEVEL 1 FIRST HR: Performed by: ORTHOPAEDIC SURGERY

## 2019-04-09 PROCEDURE — 25000128 H RX IP 250 OP 636: Performed by: NURSE ANESTHETIST, CERTIFIED REGISTERED

## 2019-04-09 PROCEDURE — 71000013 ZZH RECOVERY PHASE 1 LEVEL 1 EA ADDTL HR: Performed by: ORTHOPAEDIC SURGERY

## 2019-04-09 PROCEDURE — 36000069 ZZH SURGERY LEVEL 5 EA 15 ADDTL MIN: Performed by: ORTHOPAEDIC SURGERY

## 2019-04-09 PROCEDURE — 81025 URINE PREGNANCY TEST: CPT | Performed by: ANESTHESIOLOGY

## 2019-04-09 PROCEDURE — 40000306 ZZH STATISTIC PRE PROC ASSESS II: Performed by: ORTHOPAEDIC SURGERY

## 2019-04-09 PROCEDURE — 27810322 ZZHC OR SPINE - CAGE/SPACER/DISK/CORD/CONNECTOR OPNP: Performed by: ORTHOPAEDIC SURGERY

## 2019-04-09 PROCEDURE — 25000128 H RX IP 250 OP 636: Performed by: ANESTHESIOLOGY

## 2019-04-09 PROCEDURE — 36000071 ZZH SURGERY LEVEL 5 W FLUORO 1ST 30 MIN: Performed by: ORTHOPAEDIC SURGERY

## 2019-04-09 PROCEDURE — 27211022 ZZHC OR IOM SUPPLIES OPNP: Performed by: ORTHOPAEDIC SURGERY

## 2019-04-09 PROCEDURE — 25000125 ZZHC RX 250: Performed by: NURSE ANESTHETIST, CERTIFIED REGISTERED

## 2019-04-09 PROCEDURE — 37000009 ZZH ANESTHESIA TECHNICAL FEE, EACH ADDTL 15 MIN: Performed by: ORTHOPAEDIC SURGERY

## 2019-04-09 PROCEDURE — 40000277 XR SURGERY CARM FLUORO LESS THAN 5 MIN W STILLS: Mod: TC

## 2019-04-09 PROCEDURE — 37000008 ZZH ANESTHESIA TECHNICAL FEE, 1ST 30 MIN: Performed by: ORTHOPAEDIC SURGERY

## 2019-04-09 PROCEDURE — 25000132 ZZH RX MED GY IP 250 OP 250 PS 637: Performed by: ANESTHESIOLOGY

## 2019-04-09 PROCEDURE — C1713 ANCHOR/SCREW BN/BN,TIS/BN: HCPCS | Performed by: ORTHOPAEDIC SURGERY

## 2019-04-09 PROCEDURE — 95940 IONM IN OPERATNG ROOM 15 MIN: CPT | Performed by: ORTHOPAEDIC SURGERY

## 2019-04-09 PROCEDURE — 25800030 ZZH RX IP 258 OP 636: Performed by: ANESTHESIOLOGY

## 2019-04-09 DEVICE — IMPLANTABLE DEVICE: Type: IMPLANTABLE DEVICE | Site: SPINE CERVICAL | Status: FUNCTIONAL

## 2019-04-09 RX ORDER — FENTANYL CITRATE 50 UG/ML
INJECTION, SOLUTION INTRAMUSCULAR; INTRAVENOUS PRN
Status: DISCONTINUED | OUTPATIENT
Start: 2019-04-09 | End: 2019-04-09

## 2019-04-09 RX ORDER — DEXAMETHASONE SODIUM PHOSPHATE 4 MG/ML
INJECTION, SOLUTION INTRA-ARTICULAR; INTRALESIONAL; INTRAMUSCULAR; INTRAVENOUS; SOFT TISSUE PRN
Status: DISCONTINUED | OUTPATIENT
Start: 2019-04-09 | End: 2019-04-09

## 2019-04-09 RX ORDER — CEFAZOLIN SODIUM 2 G/100ML
2 INJECTION, SOLUTION INTRAVENOUS
Status: COMPLETED | OUTPATIENT
Start: 2019-04-09 | End: 2019-04-09

## 2019-04-09 RX ORDER — AMOXICILLIN 250 MG
1-2 CAPSULE ORAL 2 TIMES DAILY
Qty: 30 TABLET | Refills: 0 | Status: SHIPPED | OUTPATIENT
Start: 2019-04-09 | End: 2019-05-28

## 2019-04-09 RX ORDER — HYDROMORPHONE HYDROCHLORIDE 1 MG/ML
.3-.5 INJECTION, SOLUTION INTRAMUSCULAR; INTRAVENOUS; SUBCUTANEOUS EVERY 5 MIN PRN
Status: DISCONTINUED | OUTPATIENT
Start: 2019-04-09 | End: 2019-04-10 | Stop reason: HOSPADM

## 2019-04-09 RX ORDER — GLYCOPYRROLATE 0.2 MG/ML
INJECTION, SOLUTION INTRAMUSCULAR; INTRAVENOUS PRN
Status: DISCONTINUED | OUTPATIENT
Start: 2019-04-09 | End: 2019-04-09

## 2019-04-09 RX ORDER — LABETALOL 20 MG/4 ML (5 MG/ML) INTRAVENOUS SYRINGE
10
Status: DISCONTINUED | OUTPATIENT
Start: 2019-04-09 | End: 2019-04-10 | Stop reason: HOSPADM

## 2019-04-09 RX ORDER — SODIUM CHLORIDE, SODIUM LACTATE, POTASSIUM CHLORIDE, CALCIUM CHLORIDE 600; 310; 30; 20 MG/100ML; MG/100ML; MG/100ML; MG/100ML
INJECTION, SOLUTION INTRAVENOUS CONTINUOUS
Status: DISCONTINUED | OUTPATIENT
Start: 2019-04-09 | End: 2019-04-10 | Stop reason: HOSPADM

## 2019-04-09 RX ORDER — LIDOCAINE HYDROCHLORIDE 10 MG/ML
INJECTION, SOLUTION INFILTRATION; PERINEURAL PRN
Status: DISCONTINUED | OUTPATIENT
Start: 2019-04-09 | End: 2019-04-09

## 2019-04-09 RX ORDER — SODIUM CHLORIDE, SODIUM LACTATE, POTASSIUM CHLORIDE, CALCIUM CHLORIDE 600; 310; 30; 20 MG/100ML; MG/100ML; MG/100ML; MG/100ML
INJECTION, SOLUTION INTRAVENOUS CONTINUOUS
Status: DISCONTINUED | OUTPATIENT
Start: 2019-04-09 | End: 2019-04-09 | Stop reason: HOSPADM

## 2019-04-09 RX ORDER — FENTANYL CITRATE 50 UG/ML
25-50 INJECTION, SOLUTION INTRAMUSCULAR; INTRAVENOUS
Status: DISCONTINUED | OUTPATIENT
Start: 2019-04-09 | End: 2019-04-10 | Stop reason: HOSPADM

## 2019-04-09 RX ORDER — OXYCODONE AND ACETAMINOPHEN 5; 325 MG/1; MG/1
1-2 TABLET ORAL EVERY 4 HOURS PRN
Qty: 30 TABLET | Refills: 0 | Status: SHIPPED | OUTPATIENT
Start: 2019-04-09 | End: 2019-05-28

## 2019-04-09 RX ORDER — LIDOCAINE 40 MG/G
CREAM TOPICAL
Status: DISCONTINUED | OUTPATIENT
Start: 2019-04-09 | End: 2019-04-09 | Stop reason: HOSPADM

## 2019-04-09 RX ORDER — HYDRALAZINE HYDROCHLORIDE 20 MG/ML
2.5-5 INJECTION INTRAMUSCULAR; INTRAVENOUS EVERY 10 MIN PRN
Status: DISCONTINUED | OUTPATIENT
Start: 2019-04-09 | End: 2019-04-10 | Stop reason: HOSPADM

## 2019-04-09 RX ORDER — ONDANSETRON 4 MG/1
4 TABLET, ORALLY DISINTEGRATING ORAL EVERY 30 MIN PRN
Status: DISCONTINUED | OUTPATIENT
Start: 2019-04-09 | End: 2019-04-10 | Stop reason: HOSPADM

## 2019-04-09 RX ORDER — PROPOFOL 10 MG/ML
INJECTION, EMULSION INTRAVENOUS CONTINUOUS PRN
Status: DISCONTINUED | OUTPATIENT
Start: 2019-04-09 | End: 2019-04-09

## 2019-04-09 RX ORDER — PROPOFOL 10 MG/ML
INJECTION, EMULSION INTRAVENOUS PRN
Status: DISCONTINUED | OUTPATIENT
Start: 2019-04-09 | End: 2019-04-09

## 2019-04-09 RX ORDER — BUPIVACAINE HYDROCHLORIDE AND EPINEPHRINE 5; 5 MG/ML; UG/ML
INJECTION, SOLUTION PERINEURAL PRN
Status: DISCONTINUED | OUTPATIENT
Start: 2019-04-09 | End: 2019-04-10 | Stop reason: HOSPADM

## 2019-04-09 RX ORDER — CEFAZOLIN SODIUM 1 G/3ML
1 INJECTION, POWDER, FOR SOLUTION INTRAMUSCULAR; INTRAVENOUS SEE ADMIN INSTRUCTIONS
Status: DISCONTINUED | OUTPATIENT
Start: 2019-04-09 | End: 2019-04-09 | Stop reason: HOSPADM

## 2019-04-09 RX ORDER — ONDANSETRON 2 MG/ML
INJECTION INTRAMUSCULAR; INTRAVENOUS PRN
Status: DISCONTINUED | OUTPATIENT
Start: 2019-04-09 | End: 2019-04-09

## 2019-04-09 RX ORDER — ONDANSETRON 2 MG/ML
4 INJECTION INTRAMUSCULAR; INTRAVENOUS EVERY 30 MIN PRN
Status: DISCONTINUED | OUTPATIENT
Start: 2019-04-09 | End: 2019-04-10 | Stop reason: HOSPADM

## 2019-04-09 RX ORDER — ACETAMINOPHEN 500 MG
1000 TABLET ORAL ONCE
Status: COMPLETED | OUTPATIENT
Start: 2019-04-09 | End: 2019-04-09

## 2019-04-09 RX ADMIN — SODIUM CHLORIDE, POTASSIUM CHLORIDE, SODIUM LACTATE AND CALCIUM CHLORIDE: 600; 310; 30; 20 INJECTION, SOLUTION INTRAVENOUS at 20:48

## 2019-04-09 RX ADMIN — ONDANSETRON 4 MG: 2 INJECTION INTRAMUSCULAR; INTRAVENOUS at 22:03

## 2019-04-09 RX ADMIN — Medication 60 MG: at 20:53

## 2019-04-09 RX ADMIN — GLYCOPYRROLATE 0.2 MG: 0.2 INJECTION, SOLUTION INTRAMUSCULAR; INTRAVENOUS at 20:53

## 2019-04-09 RX ADMIN — CEFAZOLIN SODIUM 2 G: 2 INJECTION, SOLUTION INTRAVENOUS at 20:48

## 2019-04-09 RX ADMIN — CEFAZOLIN 1 G: 1 INJECTION, POWDER, FOR SOLUTION INTRAMUSCULAR; INTRAVENOUS at 22:44

## 2019-04-09 RX ADMIN — FENTANYL CITRATE 50 MCG: 50 INJECTION, SOLUTION INTRAMUSCULAR; INTRAVENOUS at 21:19

## 2019-04-09 RX ADMIN — FENTANYL CITRATE 50 MCG: 50 INJECTION, SOLUTION INTRAMUSCULAR; INTRAVENOUS at 21:46

## 2019-04-09 RX ADMIN — PROPOFOL 100 MCG/KG/MIN: 10 INJECTION, EMULSION INTRAVENOUS at 21:20

## 2019-04-09 RX ADMIN — ACETAMINOPHEN 1000 MG: 500 TABLET, FILM COATED ORAL at 17:53

## 2019-04-09 RX ADMIN — HYDROMORPHONE HYDROCHLORIDE 0.5 MG: 1 INJECTION, SOLUTION INTRAMUSCULAR; INTRAVENOUS; SUBCUTANEOUS at 21:38

## 2019-04-09 RX ADMIN — ROCURONIUM BROMIDE 5 MG: 10 INJECTION INTRAVENOUS at 20:52

## 2019-04-09 RX ADMIN — MIDAZOLAM 2 MG: 1 INJECTION INTRAMUSCULAR; INTRAVENOUS at 20:48

## 2019-04-09 RX ADMIN — LIDOCAINE HYDROCHLORIDE 50 MG: 10 INJECTION, SOLUTION INFILTRATION; PERINEURAL at 20:52

## 2019-04-09 RX ADMIN — FENTANYL CITRATE 100 MCG: 50 INJECTION, SOLUTION INTRAMUSCULAR; INTRAVENOUS at 20:52

## 2019-04-09 RX ADMIN — DEXAMETHASONE SODIUM PHOSPHATE 4 MG: 4 INJECTION, SOLUTION INTRA-ARTICULAR; INTRALESIONAL; INTRAMUSCULAR; INTRAVENOUS; SOFT TISSUE at 20:53

## 2019-04-09 RX ADMIN — PROPOFOL 160 MG: 10 INJECTION, EMULSION INTRAVENOUS at 20:52

## 2019-04-09 RX ADMIN — SODIUM CHLORIDE, POTASSIUM CHLORIDE, SODIUM LACTATE AND CALCIUM CHLORIDE: 600; 310; 30; 20 INJECTION, SOLUTION INTRAVENOUS at 22:10

## 2019-04-09 RX ADMIN — HYDROMORPHONE HYDROCHLORIDE 0.5 MG: 1 INJECTION, SOLUTION INTRAMUSCULAR; INTRAVENOUS; SUBCUTANEOUS at 23:48

## 2019-04-09 RX ADMIN — HYDROMORPHONE HYDROCHLORIDE 0.5 MG: 1 INJECTION, SOLUTION INTRAMUSCULAR; INTRAVENOUS; SUBCUTANEOUS at 21:40

## 2019-04-09 ASSESSMENT — MIFFLIN-ST. JEOR: SCORE: 1291.96

## 2019-04-09 NOTE — PROGRESS NOTES
SPIRITUAL HEALTH SERVICES Progress Note  [facility] Pre-Op    Saw pt Cuca Jung per her request for  support before her procedure.  Cuca reported that she is having surgery to remove and replace a cervical disc, which is compressing on her nerves.  She named her mother and sister as being central to her support network.  Cuca identifies as being spiritual and has just started attending services at [employer], a non-Protestant [employer]af[employer] in [place].  She requested prayer, which was provided.  Informed her how to request further  support once she was admitted to the floor.    No further plans; this author and other chaplains remain available per pt/family request.    Isaias Ricks M.Div., [facility]  Staff   Pager [phone]

## 2019-04-09 NOTE — ANESTHESIA PREPROCEDURE EVALUATION
Anesthesia Pre-Procedure Evaluation    Patient: Cuca Jung   MRN: [id number] : 1985          Preoperative Diagnosis: Herniated disc, myelopathy, stenosis    Procedure(s):  C5-6 anterior cervical disc arthroplasty    Past Medical History:   Diagnosis Date     ASCUS with positive high risk HPV cervical 1/15/09, 2017, 18    type unknown; not 16/18. 18: See problem list.      Borderline high cholesterol      History of colposcopy 2006, 06/10/2008    2006 - MERRY 1,  - ECC: neg     Mild intermittent asthma, uncomplicated     exercise or smoke brings it on     Papanicolaou smear of cervix with low grade squamous intraepithelial lesion (LGSIL) 2006, 2008     Past Surgical History:   Procedure Laterality Date     HC CREATE EARDRUM OPENING,GEN ANESTH      P.E. Tubes     ORTHOPEDIC SURGERY       PE TUBES  age 2     skin cancer Left 2018    maligant melenoma     TONSILLECTOMY  age 23     Anesthesia Evaluation     . Pt has had prior anesthetic.     No history of anesthetic complications          ROS/MED HX    ENT/Pulmonary:     (+)asthma , . .    Neurologic:     (+)other neuro cervical disc    Cardiovascular:     (+) Dyslipidemia, ----. : . . . :. .       METS/Exercise Tolerance:     Hematologic:  - neg hematologic  ROS       Musculoskeletal:  - neg musculoskeletal ROS       GI/Hepatic:  - neg GI/hepatic ROS       Renal/Genitourinary:  - ROS Renal section negative       Endo:  - neg endo ROS       Psychiatric:  - neg psychiatric ROS       Infectious Disease:  - neg infectious disease ROS       Malignancy:   (+) Malignancy History of Skin          Other:                          Physical Exam  Normal systems: cardiovascular and pulmonary    Airway   Mallampati: II  TM distance: >3 FB  Neck ROM: full    Dental     Cardiovascular       Pulmonary             Lab Results   Component Value Date    WBC 10.7 2019    HGB 12.8 2019    HCT 37.4 2019      "03/14/2019    CRP <2.9 03/14/2019    SED 8 03/14/2019     03/14/2019    POTASSIUM 3.8 03/14/2019    CHLORIDE 109 03/14/2019    CO2 25 03/14/2019    BUN 18 03/14/2019    CR 0.80 03/14/2019     (H) 03/14/2019    FILEMON 8.9 03/14/2019    ALBUMIN 3.4 08/25/2017    PROTTOTAL 7.3 08/25/2017    ALT 15 08/25/2017    AST 14 08/25/2017    ALKPHOS 43 08/25/2017    BILITOTAL 0.4 08/25/2017    TSH 1.13 03/14/2019    HCG Negative 04/09/2019       Preop Vitals  BP Readings from Last 3 Encounters:   04/09/19 111/74   04/05/19 111/77   03/18/19 123/70    Pulse Readings from Last 3 Encounters:   04/05/19 77   03/18/19 68   03/14/19 103      Resp Readings from Last 3 Encounters:   04/09/19 16   04/05/19 12   03/18/19 18    SpO2 Readings from Last 3 Encounters:   04/09/19 99%   04/05/19 97%   03/18/19 100%      Temp Readings from Last 1 Encounters:   04/09/19 99  F (37.2  C) (Temporal)    Ht Readings from Last 1 Encounters:   04/09/19 1.638 m (5' 4.49\")      Wt Readings from Last 1 Encounters:   04/09/19 59.4 kg (131 lb)    Estimated body mass index is 22.15 kg/m  as calculated from the following:    Height as of this encounter: 1.638 m (5' 4.49\").    Weight as of this encounter: 59.4 kg (131 lb).       Anesthesia Plan      History & Physical Review  History and physical reviewed and following examination; no interval change.    ASA Status:  2 .    NPO Status:  > 8 hours    Plan for General and ETT with Intravenous and Propofol induction. Maintenance will be Balanced.    PONV prophylaxis:  Ondansetron (or other 5HT-3) and Dexamethasone or Solumedrol       Postoperative Care  Postoperative pain management:  IV analgesics.      Consents  Anesthetic plan, risks, benefits and alternatives discussed with:  Patient.  Use of blood products discussed: Yes.   Use of blood products discussed with Patient.  Consented to blood products.  .                 Keo Webb MD                    .  "

## 2019-04-10 ENCOUNTER — APPOINTMENT (OUTPATIENT)
Dept: PHYSICAL THERAPY | Facility: CLINIC | Age: 34
End: 2019-04-10
Attending: ORTHOPAEDIC SURGERY
Payer: COMMERCIAL

## 2019-04-10 VITALS
BODY MASS INDEX: 22.36 KG/M2 | DIASTOLIC BLOOD PRESSURE: 59 MMHG | SYSTOLIC BLOOD PRESSURE: 110 MMHG | OXYGEN SATURATION: 97 % | HEART RATE: 80 BPM | TEMPERATURE: 96.7 F | HEIGHT: 64 IN | RESPIRATION RATE: 18 BRPM | WEIGHT: 131 LBS

## 2019-04-10 PROBLEM — Z98.890 S/P CERVICAL DISC REPLACEMENT: Status: ACTIVE | Noted: 2019-04-10

## 2019-04-10 LAB — GLUCOSE BLDC GLUCOMTR-MCNC: 121 MG/DL (ref 70–99)

## 2019-04-10 PROCEDURE — 25000132 ZZH RX MED GY IP 250 OP 250 PS 637: Performed by: ORTHOPAEDIC SURGERY

## 2019-04-10 PROCEDURE — 25000132 ZZH RX MED GY IP 250 OP 250 PS 637: Performed by: PHYSICIAN ASSISTANT

## 2019-04-10 PROCEDURE — 82962 GLUCOSE BLOOD TEST: CPT

## 2019-04-10 PROCEDURE — 97116 GAIT TRAINING THERAPY: CPT | Mod: GP | Performed by: PHYSICAL THERAPIST

## 2019-04-10 PROCEDURE — 25000128 H RX IP 250 OP 636: Performed by: PHYSICIAN ASSISTANT

## 2019-04-10 PROCEDURE — 97161 PT EVAL LOW COMPLEX 20 MIN: CPT | Mod: GP | Performed by: PHYSICAL THERAPIST

## 2019-04-10 PROCEDURE — 25800030 ZZH RX IP 258 OP 636: Performed by: PHYSICIAN ASSISTANT

## 2019-04-10 PROCEDURE — 25000128 H RX IP 250 OP 636: Performed by: ORTHOPAEDIC SURGERY

## 2019-04-10 PROCEDURE — 25000128 H RX IP 250 OP 636: Performed by: ANESTHESIOLOGY

## 2019-04-10 RX ORDER — OXYCODONE AND ACETAMINOPHEN 5; 325 MG/1; MG/1
1-2 TABLET ORAL EVERY 4 HOURS PRN
Status: DISCONTINUED | OUTPATIENT
Start: 2019-04-10 | End: 2019-04-10 | Stop reason: HOSPADM

## 2019-04-10 RX ORDER — ONDANSETRON 4 MG/1
4 TABLET, ORALLY DISINTEGRATING ORAL EVERY 6 HOURS PRN
Status: DISCONTINUED | OUTPATIENT
Start: 2019-04-10 | End: 2019-04-10 | Stop reason: HOSPADM

## 2019-04-10 RX ORDER — ONDANSETRON 4 MG/1
4 TABLET, ORALLY DISINTEGRATING ORAL EVERY 6 HOURS PRN
Qty: 15 TABLET | Refills: 0 | Status: SHIPPED | OUTPATIENT
Start: 2019-04-10 | End: 2019-05-28

## 2019-04-10 RX ORDER — LIDOCAINE 40 MG/G
CREAM TOPICAL
Status: DISCONTINUED | OUTPATIENT
Start: 2019-04-10 | End: 2019-04-10 | Stop reason: HOSPADM

## 2019-04-10 RX ORDER — BUDESONIDE 0.5 MG/2ML
0.5 INHALANT ORAL DAILY PRN
COMMUNITY
End: 2019-12-02

## 2019-04-10 RX ORDER — HYDROMORPHONE HYDROCHLORIDE 1 MG/ML
0.2 INJECTION, SOLUTION INTRAMUSCULAR; INTRAVENOUS; SUBCUTANEOUS
Status: DISCONTINUED | OUTPATIENT
Start: 2019-04-10 | End: 2019-04-10 | Stop reason: HOSPADM

## 2019-04-10 RX ORDER — SODIUM CHLORIDE 9 MG/ML
INJECTION, SOLUTION INTRAVENOUS CONTINUOUS
Status: DISCONTINUED | OUTPATIENT
Start: 2019-04-10 | End: 2019-04-10 | Stop reason: HOSPADM

## 2019-04-10 RX ORDER — GABAPENTIN 100 MG/1
100 CAPSULE ORAL 3 TIMES DAILY
Status: DISCONTINUED | OUTPATIENT
Start: 2019-04-10 | End: 2019-04-10 | Stop reason: HOSPADM

## 2019-04-10 RX ORDER — BACLOFEN 5 MG/1
5 TABLET ORAL 3 TIMES DAILY
Qty: 15 TABLET | Refills: 0 | Status: SHIPPED | OUTPATIENT
Start: 2019-04-10 | End: 2019-05-28

## 2019-04-10 RX ORDER — BACLOFEN 10 MG/1
5 TABLET ORAL 3 TIMES DAILY
Status: DISCONTINUED | OUTPATIENT
Start: 2019-04-10 | End: 2019-04-10 | Stop reason: HOSPADM

## 2019-04-10 RX ORDER — ONDANSETRON 2 MG/ML
4 INJECTION INTRAMUSCULAR; INTRAVENOUS EVERY 6 HOURS PRN
Status: DISCONTINUED | OUTPATIENT
Start: 2019-04-10 | End: 2019-04-10 | Stop reason: HOSPADM

## 2019-04-10 RX ORDER — HYDROXYZINE HYDROCHLORIDE 25 MG/1
25 TABLET, FILM COATED ORAL EVERY 6 HOURS PRN
Status: DISCONTINUED | OUTPATIENT
Start: 2019-04-10 | End: 2019-04-10 | Stop reason: HOSPADM

## 2019-04-10 RX ORDER — NALOXONE HYDROCHLORIDE 0.4 MG/ML
.1-.4 INJECTION, SOLUTION INTRAMUSCULAR; INTRAVENOUS; SUBCUTANEOUS
Status: DISCONTINUED | OUTPATIENT
Start: 2019-04-10 | End: 2019-04-10 | Stop reason: HOSPADM

## 2019-04-10 RX ORDER — CEFAZOLIN SODIUM 1 G/50ML
1 INJECTION, SOLUTION INTRAVENOUS ONCE
Status: COMPLETED | OUTPATIENT
Start: 2019-04-10 | End: 2019-04-10

## 2019-04-10 RX ADMIN — GABAPENTIN 100 MG: 100 CAPSULE ORAL at 08:45

## 2019-04-10 RX ADMIN — HYDROMORPHONE HYDROCHLORIDE 0.5 MG: 1 INJECTION, SOLUTION INTRAMUSCULAR; INTRAVENOUS; SUBCUTANEOUS at 00:02

## 2019-04-10 RX ADMIN — FENTANYL CITRATE 50 MCG: 50 INJECTION, SOLUTION INTRAMUSCULAR; INTRAVENOUS at 00:39

## 2019-04-10 RX ADMIN — Medication 0.2 MG: at 10:59

## 2019-04-10 RX ADMIN — Medication 0.2 MG: at 03:26

## 2019-04-10 RX ADMIN — OXYCODONE HYDROCHLORIDE AND ACETAMINOPHEN 2 TABLET: 5; 325 TABLET ORAL at 12:44

## 2019-04-10 RX ADMIN — SODIUM CHLORIDE: 9 INJECTION, SOLUTION INTRAVENOUS at 02:44

## 2019-04-10 RX ADMIN — BACLOFEN 5 MG: 10 TABLET ORAL at 08:45

## 2019-04-10 RX ADMIN — BACLOFEN 5 MG: 10 TABLET ORAL at 14:05

## 2019-04-10 RX ADMIN — HYDROXYZINE HYDROCHLORIDE 25 MG: 25 TABLET ORAL at 00:15

## 2019-04-10 RX ADMIN — FENTANYL CITRATE 50 MCG: 50 INJECTION, SOLUTION INTRAMUSCULAR; INTRAVENOUS at 00:20

## 2019-04-10 RX ADMIN — CEFAZOLIN SODIUM 1 G: 1 INJECTION, SOLUTION INTRAVENOUS at 08:46

## 2019-04-10 RX ADMIN — HYDROXYZINE HYDROCHLORIDE 25 MG: 25 TABLET ORAL at 06:15

## 2019-04-10 RX ADMIN — GABAPENTIN 100 MG: 100 CAPSULE ORAL at 14:05

## 2019-04-10 RX ADMIN — OXYCODONE HYDROCHLORIDE AND ACETAMINOPHEN 1 TABLET: 5; 325 TABLET ORAL at 08:45

## 2019-04-10 RX ADMIN — Medication 0.2 MG: at 06:17

## 2019-04-10 ASSESSMENT — ACTIVITIES OF DAILY LIVING (ADL)
TOILETING: 0-->INDEPENDENT
DRESS: 0-->INDEPENDENT
SWALLOWING: 0-->SWALLOWS FOODS/LIQUIDS WITHOUT DIFFICULTY
FALL_HISTORY_WITHIN_LAST_SIX_MONTHS: NO
AMBULATION: 0-->INDEPENDENT
BATHING: 0-->INDEPENDENT
TRANSFERRING: 0-->INDEPENDENT
COGNITION: 0 - NO COGNITION ISSUES REPORTED
RETIRED_EATING: 0-->INDEPENDENT
RETIRED_COMMUNICATION: 0-->UNDERSTANDS/COMMUNICATES WITHOUT DIFFICULTY

## 2019-04-10 NOTE — PHARMACY-ADMISSION MEDICATION HISTORY
Admission medication history interview status for this patient is complete. See [facility] admission navigator for allergy information, prior to admission medications and immunization status.     Medication history interview source(s):Patient  Medication history resources (including written lists, pill bottles, clinic record):None  Primary pharmacy:[facility]    Changes made to PTA medication list:  Added: none  Deleted: none  Changed: baclofen, pulmicort, melatonin    Actions taken by pharmacist (provider contacted, etc):None     Additional medication history information:None    Medication reconciliation/reorder completed by provider prior to medication history? No    Do you take OTC medications (eg tylenol, ibuprofen, fish oil, eye/ear drops, etc)? Y(Y/N)    For patients on insulin therapy: N (Y/N)  Lantus/levemir/NPH/Mix 70/30 dose:   (Y/N) (see Med list for doses)   Sliding scale Novolog Y/N  If Yes, do you have a baseline novolog pre-meal dose:  units with meals  Patients eat three meals a day:   Y/N    How many episodes of hypoglycemia do you have per week: _______  How many missed doses do you have per week: ______  How many times do you check your blood glucose per day: _______  Do you have a Continuous glucose monitor (CGM)   Y/N (remind pt that not approved for hospital use)   Any Barriers to therapy - Be specific :  cost of medications, comfortable with giving injections (if applicable), comfortable and confident with current diabetes regimen: Y/N ______________      Prior to Admission medications    Medication Sig Last Dose Taking? Auth Provider   albuterol (2.5 MG/3ML) 0.083% neb solution Take 1 vial (2.5 mg) by nebulization every 6 hours as needed for shortness of breath / dyspnea or wheezing 2/9/2019 Yes Giovanni Mir MD   albuterol (PROAIR HFA/PROVENTIL HFA/VENTOLIN HFA) 108 (90 Base) MCG/ACT inhaler Inhale 2-4 puffs into the lungs every 4 hours as needed for shortness of breath / dyspnea  or wheezing Past Month at Unknown time Yes Karla Bae MD   Baclofen 5 MG TABS Take 5 mg by mouth 3 times daily  4/9/2019 at 0800 Yes Reported, Patient   Biotin 2500 MCG CAPS Take 2,500 mcg by mouth daily  Past Week at Unknown time Yes Reported, Patient   budesonide (PULMICORT) 0.5 MG/2ML neb solution Take 0.5 mg by nebulization daily as needed  Yes Unknown, Entered By History   Calcium Citrate-Vitamin D (CALCIUM CITRATE+D3 PETITES PO) Take 1 tablet by mouth daily Past Week at Unknown time Yes Unknown, Entered By History   Drospirenone-Ethinyl Estradiol (LORYNA PO) Take 1 tablet by mouth daily  4/9/2019 at 0800 Yes Reported, Patient   gabapentin (NEURONTIN) 100 MG capsule Take 100 mg by mouth 3 times daily 4/9/2019 at 0800 Yes Reported, Patient   MELATONIN PO Take 10 mg by mouth nightly as needed  Past Month at Unknown time Yes Reported, Patient   Multiple Vitamins-Minerals (HM MULTIVITAMIN ADULT GUMMY) CHEW Take 1 tablet by mouth daily Past Week at Unknown time Yes Unknown, Entered By History   Omega-3 Fatty Acids (FISH OIL OMEGA-3 PO) Take 2 capsules by mouth daily 4/9/2019 at Unknown time Yes Unknown, Entered By History   oxyCODONE-acetaminophen (PERCOCET) 5-325 MG tablet Take 1-2 tablets by mouth every 4 hours as needed for pain (moderate to severe)  Yes Garrick Newton PA-C   senna-docusate (SENOKOT-S/PERICOLACE) 8.6-50 MG tablet Take 1-2 tablets by mouth 2 times daily Take while on oral narcotics to prevent or treat constipation.  Yes Garrick Newton PA-C

## 2019-04-10 NOTE — OP NOTE
Procedure Date: 04/09/2019      PREOPERATIVE DIAGNOSES:   1.  Cervical myelopathy.   2.  C5-C6 large central disk herniation with spinal cord compression.      POSTOPERATIVE DIAGNOSES:     1.  Cervical myelopathy.   2.  C5-C6 large central disk herniation with spinal cord compression.      PROCEDURES:   1.  C5-C6 anterior cervical diskectomy and disk replacement using a Mobi-C device 5 x 15 mm.   2.  Fluoroscopy.   3.  Microscopy.      SURGEON:  Aleksandar Culp MD      ASSISTANT:  KULDEEP JIMENEZ PA-C      ANESTHESIA:  General endotracheal without complications.      COMPLICATIONS:  None.      DRAINS:  None.      ESTIMATED BLOOD LOSS:  25 mL or less, minimal.      FINDINGS:  Full decompression C5-6.  A diffuse posterior disk herniation with significant amount of disk material posterior to the posterior longitudinal ligament.  All removed for full decompression.  We did use neuro monitoring during the procedure today, which improved throughout the surgery.      OPERATIVE INDICATIONS:  Cuca is a pleasant 33-year-old female who [facility] after referral from Neurology with a diagnosis of cervical myelopathy.  Over the last 6 weeks she has had an onset of unusual symptoms to her, including gait imbalance upper and lower extremity weakness, clumsiness, etc.  Physical exam demonstrated positive Cheung's testing, positive clonus, gait instability, hyperreflexia and essentially clinical diagnosis of cervical myelopathy.  Her cervical spine MRI demonstrated a large C5-6 disk herniation which is bilobed/diffuse in nature causing significant spinal cord compression at C5-6.  Based on her diagnosis, I offered the above-mentioned procedure as a possible means to at least stabilize her myelopathy diagnosis. Of course, conservative measures had been trialed up-to-date through Neurology, but unfortunately her symptoms had continued if not worsened.  To halt the progression of any sort of neurologic  deficit due to cervical myelopathy, I discussed the above-mentioned procedure as a possible means to stabilize the condition.  Of course, our hope as well is that her condition may improve postoperatively as well.  She requested surgical intervention.  We discussed the risks, benefits and alternatives to C5-6 cervical diskectomy and fusion versus cervical diskectomy and disk replacement.  We discussed the distinct differences between these 2 types of procedures and, in short, she elected to proceed with a disk replacement procedure as mentioned above.  She understood the unique risks of such a device in that we do not have long-term clinical results, it may migrate and ultimately we may need to address this with surgery at a later date.  The objective of disk replacement rather than a fusion would be to preserve motion and to prevent adjacent segment degeneration, given her young age of 33 years old.  She also understood the risks of anterior cervical exposure including dysphagia, dysphonia, massive blood loss, Imer syndrome, spinal cord injury, stroke, heart attack and death.  Ultimately, she decided to pursue surgery after understanding the risks, benefits and alternatives.  Consent was provided and signed.  Preoperative H and P was performed.      DESCRIPTION OF PROCEDURE:  On the date of the procedure, she was seen in the preoperative area.  All questions were answered.  Consent was signed by both parties.  I placed my initials to the left of the neck indicating C5-6.  Prior to being brought back to the operating room, she was brought to the bathroom to empty her bladder one last time.  After finding no contraindications to proceeding with surgery in the preoperative anesthesia assessment, she was then brought to the OR.  Once in the OR, she was successfully sedated and an ET tube was placed after she had under her own power moved in a supine fashion to the operating table comfortably.  On the operating table,  she was successfully sedated and intubated.  Neuromonitoring leads were placed.  The head was kept in any completely neutral position through the entirety of this process.  Due to her slight build, we did not place any distraction through the shoulders for this procedure.  A small bolster was placed under her shoulders and during that process again her head was kept in a very distinct neutral position during lifting and during the process of coming back down on top of the bolster.  Her head was supported by a foam donut.  Padding was used to protect the ulnar nerves bilaterally.  We gently used a mummy-style sheet around her with some towel clamps.  The anterior neck region was prepped and draped in a standard fashion.  A timeout was performed and IV antibiotics were administered.      Draped out fluoroscopy was brought in to identify the skin overlying the C5-C6 segment.  This was marked in a slightly oblique line to the left of the midline on the neck.  A deepthi was made from the midline to the medial border of the sternocleidomastoid.  This was infiltrated with Marcaine mixed with epinephrine.  A #10 blade was used to create an incision.  I dissected down with a Bovie through the slight subcutaneous tissues.  I then switched to a Stephany retractor superiorly.  An Adson and then Metzenbaum scissors were then used to expose the platysma.  The medial the medial aspect of the platysma was taken down, but later repaired at the end of the procedure.  I then performed a blunt dissection down using a standard Diaz-Bangura approach to the anterior cervical spine.  The anterior cervical spine was palpated.  We preliminarily checked our level with a Penfield retractor noted to be at C5-6.  A twice bent needle was then placed into the disk space to verify it once again.  Imaging was saved.  We then went back into the incision.  The needle was removed and the disk was marked with a Bovie.  We freed up the longus colli  bilaterally.  The handheld retractors were then replaced with a Trimline retractor.  The inferior aspect of C5 and the superior aspect of C6 vertebral bodies was skeletonized with the Bovie.  [place] pins were placed in the midline under direct fluoroscopic visualization in line with the endplates.  Subtle distraction was applied across the [place] pins and even with this, the neuro monitoring improved.  An annulotomy was created at C5-6.  A pituitary was used to remove the disk material, which freely exposed itself.  I then switched over to a straight curet from the metric set for complete diskectomy out to the uncovertebral joints bilaterally.  This was taken posteriorly to the posterior osteophytes which were then taken out with a #2 Kerrison.  In doing so, this exposed a significant amount of disk material.  When this was removed, I found further disk material posterior to the posterior longitudinal ligament.  The PLL was taken down with a dull small nerve hook across the entirety of the disk space.  When doing so, a significant amount of disk material continued to expose itself, which was removed with a micropituitary.  In the end, we performed a very nice decompression posteriorly through the entire extent of the disk space.  I was able to visualize the anterior dura without any evidence of ongoing compression.  I was able to palpate proximally and distally behind C5 and C6 without any evidence of ongoing removal of soft tissue.        We then trialled using a 5 x 15 spacer.  X-rays verified its appropriate position.  Then after we verified a full diskectomy and trialled prior to the proper size, the trial was replaced with our final Blossom Mobi-C 5 x 15 mm implant.  This was advanced very slowly directly into the disk space under direct fluoroscopic visualization.  It was noted to sit quite nicely within the disk space from an anterior to posterior dimension.  Upon reaching its final resting place, we removed  distraction.  It sat very nicely within the disk space.  The insertion device was removed and it was noted to sit very nice and flush with the anterior vertebral body.  This essentially completed the procedure.  We verified its position in both the AP and lateral projection and fluoroscopy.  The Raiford pins were then removed.  The bone holes were filled with bone wax for hemostasis purposes.  The wound was copiously irrigated and suctioned.  During the procedure, we did not witness much of any bleeding, certainly, in the neck soft tissues vasculature.  Minimal epidural bleeding was encountered.  Absolutely no CSF concern was had during the case.  The Trimline retractors were removed.  We then made a visual inspection of the neck, soft tissues and vascular structures.  There was no evidence of bleeding.  We then took a HILARIO drain out a separate poke hole just distal and lateral to the incision.  This was tied to the skin with a 3-0 nylon stitch and placed directly into the anterior cervical spine.  Under direct visualization with a bayonet pickups.  With the drain in place, we then closed the wound.  This included a medial repair of the platysma, which had been previously taken down for exposure.  Lastly, we closed the wound with subcutaneous Vicryl and subcuticular Monocryl on the skin.  The wound was cleaned and dried.  Dermabond was applied.  Sterile dressings were applied.  The drapes were taken down.  She was placed into a soft collar.  She was transferred by the surgical team from the operating table to the hospital bed and brought to the PACU in stable condition.      Garrick Newton PA-C was present through the entirety of the case.  He assisted in positioning, suctioning, working directly through the operating microscope and handling equipment through the procedure.  Fluoroscopy was necessary for the procedure today for visualization of our correct level as well as placement of our instrumentation.  Operating  microscope was utilized heavily through the procedure.  In fact, it was used through the great majority of the case once the Trimline retractors were placed into the wound.  The microscope was used and for the diskectomy process as well as taking down the posterior osteophytes.  Posterior osteophytes were also taken down and the medial uncovertebral joints were flattened briefly with the high speed bur.  Counts were correct prior to wound closure.         ALEKSANDR BETTENCOURT MD             D: 04/10/2019   T: 04/10/2019   MT:       Name:     JOCELINE MANZANO   MRN:      [id number]        Account:        [id number]   :      1985           Procedure Date: 2019      Document: [id number]

## 2019-04-10 NOTE — PLAN OF CARE
Discharge Planner PT   PT: PT evaluation completion , treatment initiated for pt admitted with C5-6 severe stenosis and myelopathy. POD#1 s/p C5-6 discectomy with replacement. At baseline, pt indep ADL/IADL, lives in a house with multiple stairs. Works as a nurse. Sister and friends will assist as needed. Has a RW she may be able to borrow.    Patient plan for discharge: Home today, sister to stay with her overnight  Current status: Progressing. Pain in neck and throat with swallowing, but able to eat soft foods, has collar on. Log roll and side lying to sit indep, transfers sit<>stand and toilet indep, gait without assistive device over 350' in hallways including 4 stairs w/o rails per outdoor entrance, and 12 stairs to bedroom level w/ S only.   Barriers to return to prior living situation: none, sister and friends will help with dog care. Has a meal train set up for her.  Recommendations for discharge: Home today w/ above support.   Rationale for recommendations: Pt has met safe navigation goals, pain is manageable, Pt should do well at home considering PLOF and current status.   Physical Therapy Discharge Summary    Reason for therapy discharge:    Discharged to home.  All goals and outcomes met, no further needs identified.    Progress towards therapy goal(s). See goals on Care Plan in [facility] electronic health record for goal details.  Goals met    Therapy recommendation(s):    No further therapy is recommended.Pt will walk, resume ex once approved by spine.            Entered by: Bria Randall 04/10/2019 10:40 AM

## 2019-04-10 NOTE — OR NURSING
Pt sister Sherice given her discharge prescriptions - senna and oxycodone with tylenol at this time.

## 2019-04-10 NOTE — ANESTHESIA CARE TRANSFER NOTE
Patient: Cuca Jung    Procedure(s):  Cervical 5-6 anterior disectomy and osteophyectomies, cerical disc arthroplasty    Diagnosis: Herniated disc, myelopathy, stenosis  Diagnosis Additional Information: No value filed.    Anesthesia Type:   General, ETT     Note:  Airway :Face Mask  Patient transferred to:PACU  Comments: Report and signed off to RN in PACU.  Good Resps, skin pink, VSS, O2 via face tent.      Vitals: (Last set prior to Anesthesia Care Transfer)    CRNA VITALS  4/9/2019 2254 - 4/9/2019 2331      4/9/2019             Pulse:  90    SpO2:  96 %    Resp Rate (observed):  11                Electronically Signed By: CODY Whaley CRNA  April 9, 2019  11:31 PM

## 2019-04-10 NOTE — PROGRESS NOTES
04/10/19 0945   Quick Adds   Type of Visit Initial PT Evaluation   Living Environment   Lives With sibling(s)  (sister will stay tonight)   Living Arrangements house   Transportation Anticipated family or friend will provide   Living Environment Comment pt lives in a house with 5 stairs to enter w/o a railing, and multiple level split with 3 stairs to LL and 12 stairs to bedroom, Pt will hang out in LL for a couple days.    Self-Care   Usual Activity Tolerance moderate   Current Activity Tolerance fair   Regular Exercise   (5 x week typically, no ex lately)   Equipment Currently Used at Home none   Activity/Exercise/Self-Care Comment Indep ADL/IADL, not working past month, and off now for a couple weeks,.    Functional Level Prior   Ambulation 0-->independent   Transferring 0-->independent   Toileting 0-->independent   Bathing 0-->independent   Communication 0-->understands/communicates without difficulty   Swallowing 0-->swallows foods/liquids without difficulty   Cognition 0 - no cognition issues reported   Fall history within last six months no   Which of the above functional risks had a recent onset or change? ambulation   Prior Functional Level Comment pt is a nurse at [employer]   General Information   Onset of Illness/Injury or Date of Surgery - Date 04/09/19   Referring Physician Deal   Patient/Family Goals Statement feel better , less pain   Pertinent History of Current Problem (include personal factors and/or comorbidities that impact the POC) POD #1 s/p Cervical disc replacement C5-6 with replacement./ Pt suffered from severe stenosis and myelopathy with fatigue, weakness, numbness and hyperesthesia in limbs.    Precautions/Limitations other (see comments)  (collar on When up, no strenuous ex, no lift >7 lbs)   General Observations Pt in bed w/ collar on, just ate, painful swallowing   Cognitive Status Examination   Orientation orientation to person, place and time   Level of  Consciousness alert   Follows Commands and Answers Questions 100% of the time;able to follow multistep instructions   Personal Safety and Judgment intact   Memory intact   Pain Assessment   Patient Currently in Pain Yes, see Vital Sign flowsheet  (neck and throat)   Posture    Posture Comments stiff posture this am, collar on   Range of Motion (ROM)   ROM Comment cervical ROM NT   Strength   Strength Comments Pt feels LE's are still weak, strength is functional for navigation   Bed Mobility   Bed Mobility Comments SBA, vc for long roll   Transfer Skills   Transfer Comments slow , but Indep   Gait   Gait Comments on level indoor suface , slow and careful x 25' w/o AD, see daily doc for details   Balance   Balance Comments mild instability she has been dealing with due to cord compression, but no falls, and no over LOB. Has a RW she may be able to trinity for longer navigation if needed   Sensory Examination   Sensory Perception Comments hyper sensative skin on limbs, especially BLE's   Muscle Tone   Muscle Tone no deficits were identified   General Therapy Interventions   Planned Therapy Interventions gait training;bed mobility training;progressive activity/exercise   Intervention Comments stairs, review precautions   Clinical Impression   Criteria for Skilled Therapeutic Intervention yes, treatment indicated   PT Diagnosis limited functional mobility post op   Influenced by the following impairments neck and throat pain, mild instability LE weakness   Functional limitations due to impairments slower cautious gait   Clinical Presentation Stable/Uncomplicated   Clinical Presentation Rationale per clincial observation   Clinical Decision Making (Complexity) Low complexity   Therapy Frequency` daily   Predicted Duration of Therapy Intervention (days/wks) today   Anticipated Discharge Disposition Home  (sister and friend support as needed, meal train)   Risk & Benefits of therapy have been explained Yes   Patient, Family &  "other staff in agreement with plan of care Yes   Clinical Impression Comments DC home today   [facility] AM-PAC TM \"6 Clicks\"   2016, Trustees of [facility], under license to [employer].  All rights reserved.   6 Clicks Short Forms Basic Mobility Inpatient Short Form   [facility] AM-PAC  \"6 Clicks\" V.2 Basic Mobility Inpatient Short Form   1. Turning from your back to your side while in a flat bed without using bedrails? 4 - None   2. Moving from lying on your back to sitting on the side of a flat bed without using bedrails? 4 - None   3. Moving to and from a bed to a chair (including a wheelchair)? 4 - None   4. Standing up from a chair using your arms (e.g., wheelchair, or bedside chair)? 4 - None   5. To walk in hospital room? 4 - None   6. Climbing 3-5 steps with a railing? 3 - A Little   Basic Mobility Raw Score (Score out of 24.Lower scores equate to lower levels of function) 23   Total Evaluation Time   Total Evaluation Time (Minutes) 20     "

## 2019-04-10 NOTE — PLAN OF CARE
0157 and 0231 - Dr. Culp paged - Home medication - pt would like Neurontin and Baclofen reordered to help with her restless legs.  2217 - Dr. Culp called back - orders placed in Epic for HILARIO removal, Ancef, and certain home meds (baclofen and neurontin) reordered.    Up with 1 assist and gait belt. LS clear, BS hypo, no flatus. Anterior neck drsg CDI with HILARIO intact to suction. Voiding adequately. Pain partially controlled with vistaril and IV dilaudid. CMS - sensitive to touch, numbness throughout extremities. Plan is for possible discharge to home today if meets criteria.

## 2019-04-29 ENCOUNTER — TELEPHONE (OUTPATIENT)
Dept: NEUROLOGY | Facility: CLINIC | Age: 34
End: 2019-04-29

## 2019-04-29 NOTE — TELEPHONE ENCOUNTER
"[facility] Call Center    Phone Message    May a detailed message be left on voicemail: yes    Reason for Call: Pt is looking to transfer care from [facility]. Writer scheduled her off of dx in order and it did not seem to fall under a \"spinal cord injury\" per protocols. Please call pt to discuss her appt.    Action Taken: Message routed to:  Adult Clinics: Neurology p [id number]  "

## 2019-04-30 NOTE — TELEPHONE ENCOUNTER
Called patient and explained that she needs to get all of her MRIs and reports for Rebekah. Pt states that she saw-Dr. Phoenix. Pt verbalized understanding.     Luanne Gonzalez LPN

## 2019-05-02 ENCOUNTER — OFFICE VISIT (OUTPATIENT)
Dept: PEDIATRICS | Facility: CLINIC | Age: 34
End: 2019-05-02
Payer: COMMERCIAL

## 2019-05-02 VITALS
OXYGEN SATURATION: 100 % | HEART RATE: 70 BPM | TEMPERATURE: 96.6 F | SYSTOLIC BLOOD PRESSURE: 100 MMHG | WEIGHT: 131 LBS | DIASTOLIC BLOOD PRESSURE: 60 MMHG | BODY MASS INDEX: 22.15 KG/M2

## 2019-05-02 DIAGNOSIS — M62.838 MUSCLE SPASMS OF BOTH LOWER EXTREMITIES: ICD-10-CM

## 2019-05-02 DIAGNOSIS — M79.10 MUSCLE PAIN: Primary | ICD-10-CM

## 2019-05-02 DIAGNOSIS — R29.898 WEAKNESS OF BOTH LOWER EXTREMITIES: ICD-10-CM

## 2019-05-02 PROCEDURE — 99213 OFFICE O/P EST LOW 20 MIN: CPT | Performed by: NURSE PRACTITIONER

## 2019-05-02 NOTE — PATIENT INSTRUCTIONS
PLAN:   1.   Symptomatic therapy suggested: Continue current medication regimen unchanged.    2. Patient needs to follow up in if no improvement,or sooner if worsening of symptoms or other symptoms develop.  FOLLOW UP WITH SPECIALIST :Neurology

## 2019-05-02 NOTE — NURSING NOTE
"Chief Complaint   Patient presents with     Establish Care       Initial /60 (BP Location: Right arm, Patient Position: Sitting, Cuff Size: Adult Regular)   Pulse 70   Temp 96.6  F (35.9  C) (Temporal)   Wt 59.4 kg (131 lb)   LMP 03/31/2019   SpO2 100%   Breastfeeding? No   BMI 22.15 kg/m   Estimated body mass index is 22.15 kg/m  as calculated from the following:    Height as of 4/9/19: 1.638 m (5' 4.49\").    Weight as of this encounter: 59.4 kg (131 lb).  Medication Reconciliation: complete      CHANI Bhardwaj      "

## 2019-05-02 NOTE — PROGRESS NOTES
SUBJECTIVE:   Cuca Jung is a 33 year old female who presents to clinic today for the following   health issues:    New Patient/Transfer of Care-patient had cervical disc replacement 4/9/19 and was informed to have a primary care physician. Patient noted she was seen at [facility] previously before being seen in the [place] system.     She is 3 weeks post cervical surgery due to spinal compression   Was seen at [facility] MRI brain and cervical spine   Did an EMG there as well of arms and legs   Was having numbness and tingling in arms and legs  Weakness in upper and lower and skin sensitivity   Was positive for hyperreflexia during evaluation with neurology   Some of the symptoms are improved since her surgery   Still having weakness and spasms in lower legs and is some better and is still present   Will have pain always there in both of her legs  States balance is improving   Was taking Baclofen and has run out   Continues to have spasming of the the lower legs from thighs all the way down but is some better with surgery but still pain every   Had ALIZE tested and mildly positive but rest negative   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  No      Respiratory symptoms:   Cough: No   Wheezing: No   Shortness of breath: No    Use of short- acting(rescue) inhaler: intermittent with activity     Taking controlled (daily) meds as prescribed: No    ER/UC visits or hospital admissions since last visit: none     Recent asthma triggers that patient is dealing with: smoke     ACT Total Scores 3/15/2019   ACT TOTAL SCORE (Goal Greater than or Equal to 20) 23   In the past 12 months, how many times did you visit the emergency room for your asthma without being admitted to the hospital? 1   In the past 12 months, how many times were you hospitalized overnight because of your asthma? 0         Additional history: as documented    Reviewed  and  updated as needed this visit by clinical staff  Tobacco  Allergies  Meds  Med Hx  Surg Hx  Fam Hx  Soc Hx        Reviewed and updated as needed this visit by Provider         Patient Active Problem List   Diagnosis     Encounter for surveillance of other contraceptive     Family history of malignant neoplasm of breast     CARDIOVASCULAR SCREENING; LDL GOAL LESS THAN 160     Mild intermittent asthma, uncomplicated     Family history of malignant neoplasm of breast     Dysplasia of cervix, low grade (MERRY 1)     Skin cancer (melanoma) ([facility])     S/P cervical disc replacement     Past Surgical History:   Procedure Laterality Date     HC CREATE EARDRUM OPENING,GEN ANESTH  1991    P.E. Tubes     ORTHOPEDIC SURGERY       PE TUBES  age 2     REPLACE DISK CERVICAL ANTERIOR N/A 4/9/2019    Procedure: 1.  C5-C6 anterior cervical diskectomy and disk replacement using a Mobi-C device 5 x 15 mm.  2.  Fluoroscopy.  3.  Microscopy.  ;  Surgeon: Aleksandar Culp MD;  Location: [facility] OR     skin cancer Left 06/2018    maligant melenoma     TONSILLECTOMY  age 23       Social History     Tobacco Use     Smoking status: Never Smoker     Smokeless tobacco: Never Used   Substance Use Topics     Alcohol use: Yes     Alcohol/week: 0.0 oz     Comment: once per month     Family History   Problem Relation Age of Onset     Breast Cancer Mother 40        Mastectomy in Early 40s     Hypertension Mother      Cerebrovascular Disease Mother         TIA     Blood Disease Mother         high platelets      Hypertension Father      Lipids Father      Hypertension Paternal Grandfather      Hypertension Paternal Grandmother      Neurologic Disorder Paternal Grandmother         Parkinson's     Thyroid Disease Maternal Uncle         thyroid cancer     Neurologic Disorder Other         MS     Multiple Sclerosis Paternal Aunt      Rheumatoid Arthritis Cousin      Lupus Cousin      Hashimoto's thyroiditis Cousin      Graves' disease Paternal Aunt      Diabetes  No family hx of      Glaucoma No family hx of      Macular Degeneration No family hx of          Current Outpatient Medications   Medication Sig Dispense Refill     albuterol (2.5 MG/3ML) 0.083% neb solution Take 1 vial (2.5 mg) by nebulization every 6 hours as needed for shortness of breath / dyspnea or wheezing 50 vial 5     albuterol (PROAIR HFA/PROVENTIL HFA/VENTOLIN HFA) 108 (90 Base) MCG/ACT inhaler Inhale 2-4 puffs into the lungs every 4 hours as needed for shortness of breath / dyspnea or wheezing 1 Inhaler 11     baclofen 5 MG TABS Take 5 mg by mouth 3 times daily 15 tablet 0     Biotin 2500 MCG CAPS Take 2,500 mcg by mouth daily        budesonide (PULMICORT) 0.5 MG/2ML neb solution Take 0.5 mg by nebulization daily as needed       Calcium Citrate-Vitamin D (CALCIUM CITRATE+D3 PETITES PO) Take 1 tablet by mouth daily       Drospirenone-Ethinyl Estradiol (LORYNA PO) Take 1 tablet by mouth daily        gabapentin (NEURONTIN) 100 MG capsule Take 100 mg by mouth 3 times daily       MELATONIN PO Take 10 mg by mouth nightly as needed        Multiple Vitamins-Minerals (HM MULTIVITAMIN ADULT GUMMY) CHEW Take 1 tablet by mouth daily       Omega-3 Fatty Acids (FISH OIL OMEGA-3 PO) Take 2 capsules by mouth daily       ondansetron (ZOFRAN-ODT) 4 MG ODT tab Take 1 tablet (4 mg) by mouth every 6 hours as needed for nausea or vomiting (Patient not taking: Reported on 5/2/2019) 15 tablet 0     oxyCODONE-acetaminophen (PERCOCET) 5-325 MG tablet Take 1-2 tablets by mouth every 4 hours as needed for pain (moderate to severe) (Patient not taking: Reported on 5/2/2019) 30 tablet 0     senna-docusate (SENOKOT-S/PERICOLACE) 8.6-50 MG tablet Take 1-2 tablets by mouth 2 times daily Take while on oral narcotics to prevent or treat constipation. (Patient not taking: Reported on 5/2/2019) 30 tablet 0     Allergies   Allergen Reactions     Seasonal Allergies      Adhesive Tape Rash     Nickel Rash     Yossi Rash     Metals-rash      BP Readings from Last 3 Encounters:   05/02/19 100/60   04/10/19 110/59   04/05/19 111/77    Wt Readings from Last 3 Encounters:   05/02/19 59.4 kg (131 lb)   04/09/19 59.4 kg (131 lb)   04/05/19 59.4 kg (131 lb)         Labs reviewed in [facility]    ROS:  CONSTITUTIONAL:POSITIVE  for fatigue and NEGATIVE  for anorexia, arthralgias, weight gain and weight loss  ENT/MOUTH: NEGATIVE for ear, mouth and throat problems  RESP:NEGATIVE for significant cough or SOB  CV: NEGATIVE for chest pain, palpitations or peripheral edema  GI: NEGATIVE for nausea, abdominal pain, heartburn, or change in bowel habits  MUSCULOSKELETAL: POSITIVE  for muscle spasm , muscle weakness  and myalgia and NEGATIVE for joint swelling  and joint warmth   NEURO: POSITIVE for numbness or tingling  and NEGATIVE for gait disturbance and syncope  ENDOCRINE: NEGATIVE for polydipsia, polyphagia and polyuria  PSYCHIATRIC: POSITIVE for insomnia but does the night shift  and NEGATIVE forthoughts of hurting someone else and thoughts of self harm    OBJECTIVE:     /60 (BP Location: Right arm, Patient Position: Sitting, Cuff Size: Adult Regular)   Pulse 70   Temp 96.6  F (35.9  C) (Temporal)   Wt 59.4 kg (131 lb)   LMP 03/31/2019   SpO2 100%   Breastfeeding? No   BMI 22.15 kg/m    Body mass index is 22.15 kg/m .  GENERAL APPEARANCE: alert, active and no distress  NECK: no adenopathy, no asymmetry, masses, or scars and thyroid normal to palpation  RESP: lungs clear to auscultation - no rales, rhonchi or wheezes  CV: regular rates and rhythm and no murmur, click or rub  MS: extremities normal- no gross deformities noted, gait normal and normal muscle tone  no musculoskeletal defects are noted, gait is age appropriate without ataxia  no active joints  SKIN: no suspicious lesions or rashes  NEURO: Normal strength and tone, mentation intact and speech normal  PSYCH: mentation appears normal and affect normal/bright    Diagnostic Test Results:  none      ASSESSMENT/PLAN:       Cuca was seen today for establish care.    Diagnoses and all orders for this visit:    Muscle pain    Muscle spasms of both lower extremities    Weakness of both lower extremities        See Patient Instructions  Patient Instructions   PLAN:   1.   Symptomatic therapy suggested: Continue current medication regimen unchanged.    2. Patient needs to follow up in if no improvement,or sooner if worsening of symptoms or other symptoms develop.  FOLLOW UP WITH SPECIALIST :Neurology    CODY Thompson [facility]

## 2019-05-28 ENCOUNTER — PRE VISIT (OUTPATIENT)
Dept: NEUROLOGY | Facility: CLINIC | Age: 34
End: 2019-05-28

## 2019-05-28 NOTE — TELEPHONE ENCOUNTER
PREVISIT INFORMATION                                                    Cuca Jung scheduled for future visit at [facility] specialty clinics.    Patient is scheduled to see Dariel on 5/29  Reason for visit: see appt notes  Referring provider PCP? Or Rebekah?   Has patient seen previous specialist? MIRIAM Welch  Medical Records:  Will hand carry records from [facility] disk of images, instructed to check in early so we can upload CD     REVIEW                                                      New patient packet mailed to patient: Yes  Medication reconciliation complete: Yes      Current Outpatient Medications   Medication Sig Dispense Refill     albuterol (2.5 MG/3ML) 0.083% neb solution Take 1 vial (2.5 mg) by nebulization every 6 hours as needed for shortness of breath / dyspnea or wheezing 50 vial 5     albuterol (PROAIR HFA/PROVENTIL HFA/VENTOLIN HFA) 108 (90 Base) MCG/ACT inhaler Inhale 2-4 puffs into the lungs every 4 hours as needed for shortness of breath / dyspnea or wheezing 1 Inhaler 11     Biotin 2500 MCG CAPS Take 2,500 mcg by mouth daily        budesonide (PULMICORT) 0.5 MG/2ML neb solution Take 0.5 mg by nebulization daily as needed       Calcium Citrate-Vitamin D (CALCIUM CITRATE+D3 PETITES PO) Take 1 tablet by mouth daily       Drospirenone-Ethinyl Estradiol (GIANVI PO) Take 1 tablet by mouth daily       gabapentin (NEURONTIN) 100 MG capsule Take 100 mg by mouth 2 times daily        MELATONIN PO Take 10 mg by mouth nightly as needed        Multiple Vitamins-Minerals (HM MULTIVITAMIN ADULT GUMMY) CHEW Take 1 tablet by mouth daily       Omega-3 Fatty Acids (FISH OIL OMEGA-3 PO) Take 2 capsules by mouth daily         Allergies: Seasonal allergies; Adhesive tape; Nickel; and Yossi        PLAN/FOLLOW-UP NEEDED                                                      Previsit review complete.  Patient will see provider at future scheduled appointment.     Patient Reminders  Given:  Please, make sure you bring an updated list of your medications.   If you are having a procedure, please, present 15 minutes early.  If you need to cancel or reschedule,please call [phone].    Richa Medrano

## 2019-05-29 ENCOUNTER — OFFICE VISIT (OUTPATIENT)
Dept: NEUROLOGY | Facility: CLINIC | Age: 34
End: 2019-05-29
Attending: PHYSICIAN ASSISTANT
Payer: COMMERCIAL

## 2019-05-29 VITALS
HEART RATE: 71 BPM | HEIGHT: 64 IN | SYSTOLIC BLOOD PRESSURE: 114 MMHG | OXYGEN SATURATION: 100 % | BODY MASS INDEX: 22.69 KG/M2 | WEIGHT: 132.9 LBS | DIASTOLIC BLOOD PRESSURE: 77 MMHG

## 2019-05-29 DIAGNOSIS — M47.12 CERVICAL SPONDYLOSIS WITH MYELOPATHY: Primary | ICD-10-CM

## 2019-05-29 PROCEDURE — 99203 OFFICE O/P NEW LOW 30 MIN: CPT | Performed by: PSYCHIATRY & NEUROLOGY

## 2019-05-29 ASSESSMENT — MIFFLIN-ST. JEOR: SCORE: 1292.83

## 2019-05-29 ASSESSMENT — PAIN SCALES - GENERAL: PAINLEVEL: NO PAIN (0)

## 2019-05-29 NOTE — PROGRESS NOTES
Visit Date:   05/29/2019      Cuca Jung is a 33-year-old female being seen for further evaluation of symptoms related to cervical myelopathy.  History is obtained from the patient as well as review of records from the [facility].  She saw Dr. Angel Phoenix there.      The patient avidly exercises.  In February of this year, she would notice at the gym if she was lifting something heavy or catching an exercise ball she would get tingling in her arms and hands.  Subsequently, she started feeling somewhat off balance.  She would feel awkward when she was running or going up stairs.  Following this, she began developing hypersensitivity in her hands and lower extremities and a sense of weakness in her legs.  She had some pain around her left trapezius.  She never had any problems with bowel or bladder control and denies having had a Lhermitte phenomenon.      She did see Dr. Angel Phoenix at the [facility] on 03/20/2019.  He noted that she had progressive extremity weakness, sensory loss and heightened reflexes.      She subsequently had additional testing done that was revealing.  She had head, cervical and thoracic MRI scans that I was able to review personally and go over with the patient.  The finding of note was a broad-based disk protrusion at C5 to C6 that severely narrowed the canal.  There is no spinal fluid seen dorsally.  The ventral cord is flattened.  There is no definite signal abnormality within the cord.  There is no abnormal enhancement.      She had a brain MRI scan which demonstrated a solitary small left frontal T2 signal abnormality.  Thoracic MRI scan was unremarkable.      She had additional testing done as well, which included a right upper and right lower extremity EMG and nerve conduction study which reportedly was normal.  Blood work included a normal B1, B12, methylmalonic acid level, TSH, ANCA, Lyme serology, NMO antibody and serum immunofixation.      With the discovery of the C5 to C6  disk herniation and cord compression, she was referred to Dr. Aleksandar Culp and underwent anterior cervical diskectomy and disk replacement on 04/09/2019.      Initially, she did not note any improvement, but as time has gone on, she definitely has gotten better.  Her upper extremity sensory symptoms have improved.  She is still hypersensitive to touch involving her lower extremities from the knees to the feet, whereas it had been up to the waist.  Her balance has improved as well.  She has noted that the clonus that was demonstrated in her ankles has resolved.      She is getting some physical therapy now and will complete that after a few more sessions.  She is on gabapentin 100 mg twice a day.  She is on light duty at work.      PAST MEDICAL HISTORY:  Notable for melanoma resected from the left thigh.  She also has asthma.      CURRENT MEDICATIONS:  Include albuterol, Biotin, Pulmicort, calcium with vitamin D, Gianvi, gabapentin 100 mg twice a day, melatonin p.r.n. sleep, multivitamin, fish oil.      SHE IS ALLERGIC TO NICKEL AND PERCOCET.      FAMILY HISTORY:  Notable for an aunt with multiple sclerosis.  There are also family members who have lupus.      She works as an RN at [employer].  She does not smoke and rarely uses alcohol.      PHYSICAL EXAMINATION:   VITAL SIGNS:  The patient's heart rate is 71.  Blood pressure 114/77.   SKIN:  She has a well healing anterior cervical scar.   NEUROLOGIC:  Pupils are equal, round and react well to light.  Fundi are unremarkable.  Cranial nerves II-XII are intact.  Motor examination reveals normal upper and lower extremity strength.  Sensory examination is intact to pin, position and vibration, although she is hypersensitive to pinprick in the feet.  Finger-to-nose and heel-to-shin are done well.  Her muscle tone is normal.  Her gait is normal.  She has mild difficulty tandem walking.  She is stable in the Romberg stance.  Reflexes are 2+ in the upper extremities with  slight increase in her finger flexors bilaterally, 3+ at the knees, 2+ at the right ankle, and 3+ at the left ankle.  Right plantar response is equivocal.  The left appears extensor.      IMPRESSION:  Cervical myelopathy related to a C5-C6 disk herniation.  She is improving symptomatically since decompressive surgery on 2019.      PLAN:  As above, I reviewed imaging testing with her.  I did tell her I felt the scenario was quite consistent with the cord compression at C5-C6.  I told her the small left frontal white matter lesion is incidental and of doubtful clinical significance and certainly not relevant to her presentation.      She was satisfied with our discussion.  I did recommend I could see her back if she notes any new or worsening neurologic symptoms, but a formal followup was not arranged today.         CHASE EASTON MD             D: 2019   T: 2019   MT: ZANDER      Name:     JOCELINE MANZANO   MRN:      [id number]        Account:      [id number]   :      1985           Visit Date:   2019      Document: [id number]

## 2019-05-29 NOTE — LETTER
5/29/2019         RE: Cuca Jung  [address] [place] [id number]        Dear Colleague,    Thank you for referring your patient, Cuca Jung, to the [facility]. Please see a copy of my visit note below.    Visit Date:   05/29/2019      Cuca Jung is a 33-year-old female being seen for further evaluation of symptoms related to cervical myelopathy.  History is obtained from the patient as well as review of records from the [facility].  She saw Dr. Angel Phoenix there.      The patient avidly exercises.  In February of this year, she would notice at the gym if she was lifting something heavy or catching an exercise ball she would get tingling in her arms and hands.  Subsequently, she started feeling somewhat off balance.  She would feel awkward when she was running or going up stairs.  Following this, she began developing hypersensitivity in her hands and lower extremities and a sense of weakness in her legs.  She had some pain around her left trapezius.  She never had any problems with bowel or bladder control and denies having had a Lhermitte phenomenon.      She did see Dr. Angel Phoenix at the [facility] on 03/20/2019.  He noted that she had progressive extremity weakness, sensory loss and heightened reflexes.      She subsequently had additional testing done that was revealing.  She had head, cervical and thoracic MRI scans that I was able to review personally and go over with the patient.  The finding of note was a broad-based disk protrusion at C5 to C6 that severely narrowed the canal.  There is no spinal fluid seen dorsally.  The ventral cord is flattened.  There is no definite signal abnormality within the cord.  There is no abnormal enhancement.      She had a brain MRI scan which demonstrated a solitary small left frontal T2 signal abnormality.  Thoracic MRI scan was unremarkable.      She had additional testing done as well, which included a right upper  and right lower extremity EMG and nerve conduction study which reportedly was normal.  Blood work included a normal B1, B12, methylmalonic acid level, TSH, ANCA, Lyme serology, NMO antibody and serum immunofixation.      With the discovery of the C5 to C6 disk herniation and cord compression, she was referred to Dr. Aleksandar Culp and underwent anterior cervical diskectomy and disk replacement on 04/09/2019.      Initially, she did not note any improvement, but as time has gone on, she definitely has gotten better.  Her upper extremity sensory symptoms have improved.  She is still hypersensitive to touch involving her lower extremities from the knees to the feet, whereas it had been up to the waist.  Her balance has improved as well.  She has noted that the clonus that was demonstrated in her ankles has resolved.      She is getting some physical therapy now and will complete that after a few more sessions.  She is on gabapentin 100 mg twice a day.  She is on light duty at work.      PAST MEDICAL HISTORY:  Notable for melanoma resected from the left thigh.  She also has asthma.      CURRENT MEDICATIONS:  Include albuterol, Biotin, Pulmicort, calcium with vitamin D, Gianvi, gabapentin 100 mg twice a day, melatonin p.r.n. sleep, multivitamin, fish oil.      SHE IS ALLERGIC TO NICKEL AND PERCOCET.      FAMILY HISTORY:  Notable for an aunt with multiple sclerosis.  There are also family members who have lupus.      She works as an RN at [employer].  She does not smoke and rarely uses alcohol.      PHYSICAL EXAMINATION:   VITAL SIGNS:  The patient's heart rate is 71.  Blood pressure 114/77.   SKIN:  She has a well healing anterior cervical scar.   NEUROLOGIC:  Pupils are equal, round and react well to light.  Fundi are unremarkable.  Cranial nerves II-XII are intact.  Motor examination reveals normal upper and lower extremity strength.  Sensory examination is intact to pin, position and vibration, although she is  hypersensitive to pinprick in the feet.  Finger-to-nose and heel-to-shin are done well.  Her muscle tone is normal.  Her gait is normal.  She has mild difficulty tandem walking.  She is stable in the Romberg stance.  Reflexes are 2+ in the upper extremities with slight increase in her finger flexors bilaterally, 3+ at the knees, 2+ at the right ankle, and 3+ at the left ankle.  Right plantar response is equivocal.  The left appears extensor.      IMPRESSION:  Cervical myelopathy related to a C5-C6 disk herniation.  She is improving symptomatically since decompressive surgery on 2019.      PLAN:  As above, I reviewed imaging testing with her.  I did tell her I felt the scenario was quite consistent with the cord compression at C5-C6.  I told her the small left frontal white matter lesion is incidental and of doubtful clinical significance and certainly not relevant to her presentation.      She was satisfied with our discussion.  I did recommend I could see her back if she notes any new or worsening neurologic symptoms, but a formal followup was not arranged today.         ERAN VIEIRA MD             D: 2019   T: 2019   MT: ZANDER      Name:     JOCELINE MANZANO   MRN:      [id number]        Account:      [id number]   :      1985           Visit Date:   2019      Document: [id number]        Again, thank you for allowing me to participate in the care of your patient.        Sincerely,        Eran Vieira MD

## 2019-05-29 NOTE — NURSING NOTE
Cuca Jung's goals for this visit include: Consult  She requests these members of her care team be copied on today's visit information:       PCP: No Ref-Primary, Physician    Referring Provider:  [place] [id number]    [unfilled]

## 2019-08-06 ENCOUNTER — E-VISIT (OUTPATIENT)
Dept: OBGYN | Facility: CLINIC | Age: 34
End: 2019-08-06
Payer: COMMERCIAL

## 2019-08-06 DIAGNOSIS — N89.8 VAGINAL DISCHARGE: Primary | ICD-10-CM

## 2019-08-06 DIAGNOSIS — B37.31 YEAST INFECTION OF THE VAGINA: ICD-10-CM

## 2019-08-06 PROCEDURE — 99207 ZZC NO BILLABLE SERVICE THIS VISIT: CPT | Performed by: OBSTETRICS & GYNECOLOGY

## 2019-08-12 DIAGNOSIS — N89.8 VAGINAL DISCHARGE: ICD-10-CM

## 2019-08-12 LAB
SPECIMEN SOURCE: ABNORMAL
WET PREP SPEC: ABNORMAL

## 2019-08-12 PROCEDURE — 87210 SMEAR WET MOUNT SALINE/INK: CPT | Performed by: OBSTETRICS & GYNECOLOGY

## 2019-08-12 RX ORDER — FLUCONAZOLE 150 MG/1
150 TABLET ORAL
Qty: 3 TABLET | Refills: 0 | Status: SHIPPED | OUTPATIENT
Start: 2019-08-12 | End: 2019-12-02

## 2019-08-13 RX ORDER — FLUCONAZOLE 150 MG/1
150 TABLET ORAL
Qty: 2 TABLET | Refills: 0 | Status: SHIPPED | OUTPATIENT
Start: 2019-08-13 | End: 2019-12-02

## 2019-10-03 ENCOUNTER — HEALTH MAINTENANCE LETTER (OUTPATIENT)
Age: 34
End: 2019-10-03

## 2019-12-02 ENCOUNTER — OFFICE VISIT (OUTPATIENT)
Dept: OBGYN | Facility: CLINIC | Age: 34
End: 2019-12-02
Payer: COMMERCIAL

## 2019-12-02 VITALS
SYSTOLIC BLOOD PRESSURE: 113 MMHG | DIASTOLIC BLOOD PRESSURE: 75 MMHG | BODY MASS INDEX: 23.34 KG/M2 | WEIGHT: 136.7 LBS | HEIGHT: 64 IN | HEART RATE: 70 BPM | OXYGEN SATURATION: 97 %

## 2019-12-02 DIAGNOSIS — N87.0 DYSPLASIA OF CERVIX, LOW GRADE (CIN 1): ICD-10-CM

## 2019-12-02 DIAGNOSIS — Z01.419 ENCOUNTER FOR GYNECOLOGICAL EXAMINATION WITHOUT ABNORMAL FINDING: Primary | ICD-10-CM

## 2019-12-02 DIAGNOSIS — Z85.820 HISTORY OF MELANOMA: ICD-10-CM

## 2019-12-02 DIAGNOSIS — J45.20 MILD INTERMITTENT ASTHMA WITHOUT COMPLICATION: ICD-10-CM

## 2019-12-02 DIAGNOSIS — Z80.3 FAMILY HISTORY OF MALIGNANT NEOPLASM OF BREAST: ICD-10-CM

## 2019-12-02 PROCEDURE — 99395 PREV VISIT EST AGE 18-39: CPT | Performed by: OBSTETRICS & GYNECOLOGY

## 2019-12-02 PROCEDURE — 87624 HPV HI-RISK TYP POOLED RSLT: CPT | Performed by: OBSTETRICS & GYNECOLOGY

## 2019-12-02 PROCEDURE — 88175 CYTOPATH C/V AUTO FLUID REDO: CPT | Performed by: OBSTETRICS & GYNECOLOGY

## 2019-12-02 RX ORDER — BUDESONIDE 0.5 MG/2ML
0.5 INHALANT ORAL DAILY PRN
Qty: 1 BOX | Refills: 11 | Status: SHIPPED | OUTPATIENT
Start: 2019-12-02 | End: 2021-01-06

## 2019-12-02 RX ORDER — ALBUTEROL SULFATE 0.83 MG/ML
2.5 SOLUTION RESPIRATORY (INHALATION) EVERY 6 HOURS PRN
Qty: 50 VIAL | Refills: 5 | Status: SHIPPED | OUTPATIENT
Start: 2019-12-02

## 2019-12-02 RX ORDER — NORETHINDRONE ACETATE AND ETHINYL ESTRADIOL .02; 1 MG/1; MG/1
1 TABLET ORAL DAILY
Qty: 112 TABLET | Refills: 4 | Status: SHIPPED | OUTPATIENT
Start: 2019-12-02 | End: 2020-07-14 | Stop reason: ALTCHOICE

## 2019-12-02 RX ORDER — ALBUTEROL SULFATE 90 UG/1
2-4 AEROSOL, METERED RESPIRATORY (INHALATION) EVERY 4 HOURS PRN
Qty: 1 INHALER | Refills: 11 | Status: SHIPPED | OUTPATIENT
Start: 2019-12-02 | End: 2021-01-06

## 2019-12-02 SDOH — HEALTH STABILITY: MENTAL HEALTH: HOW MANY STANDARD DRINKS CONTAINING ALCOHOL DO YOU HAVE ON A TYPICAL DAY?: 1 OR 2

## 2019-12-02 SDOH — HEALTH STABILITY: MENTAL HEALTH: HOW OFTEN DO YOU HAVE A DRINK CONTAINING ALCOHOL?: MONTHLY OR LESS

## 2019-12-02 SDOH — HEALTH STABILITY: MENTAL HEALTH: HOW OFTEN DO YOU HAVE 6 OR MORE DRINKS ON ONE OCCASION?: NEVER

## 2019-12-02 ASSESSMENT — MIFFLIN-ST. JEOR: SCORE: 1305.07

## 2019-12-02 NOTE — PROGRESS NOTES
Cuca is a 34 year old  female who presents for annual exam.     Menses are every 2-3 months and normal lasting 4 days.  Menses flow: normal.  Patient's last menstrual period was 2019.. Using oral contraceptives for contraception.  She is not currently considering pregnancy.  Besides routine health maintenance, she has no other health concerns today . Had spinal surgery  for diskectomy. Had nerve compression and now is getting better.   Would like to see about going on a new birth control pill as they discontinued her old generic and this one seems to make her more emotional and having hair loss.  Had a lot of labs done for her spinal surgery so does not think anything is due.  Forgot to do her mammogram last year and so needs this ordered again.  GYNECOLOGIC HISTORY:  Menarche:   Cuca is sexually active with 1male partner(s) and is currently in monogamous relationship.    History sexually transmitted infections:HPV  STI testing offered?  Declined  BELLA exposure: Unknown  History of abnormal Pap smear: YES - updated in Problem List and Health Maintenance accordingly  Family history of breast CA: Yes (Please explain): mom  Family history of uterine/ovarian CA: No    Family history of colon CA: No    HEALTH MAINTENANCE:  Cholesterol: (  Cholesterol   Date Value Ref Range Status   2018 196 <200 mg/dL Final   2017 265 (H) <200 mg/dL Final     Comment:     Desirable:       <200 mg/dl    History of abnormal lipids: No  Mammo: na . History of abnormal Mammo: Not applicable.  Regular Self Breast Exams: No  Calcium/Vitamin D intake: source:  dairy, dietary supplement(s) Adequate? Yes  TSH: (  TSH   Date Value Ref Range Status   2019 1.13 0.40 - 4.00 mU/L Final    )  Pap; (  Lab Results   Component Value Date    PAP ASC-US 2018    PAP ASC-US 2017    PAP NIL 2007    )    HISTORY:  OB History    Para Term  AB Living   0 0 0 0 0 0   SAB TAB Ectopic Multiple  Live Births   0 0 0 0 0     Past Medical History:   Diagnosis Date     ASCUS with positive high risk HPV cervical 1/15/09, 08/25/2017, 11/07/18    type unknown; not 16/18. 11/07/18: See problem list.      Borderline high cholesterol      History of colposcopy 7/19/2006, 06/10/2008    2006 - MERRY 1, 2008 - ECC: neg     Mild intermittent asthma, uncomplicated     exercise or smoke brings it on     Papanicolaou smear of cervix with low grade squamous intraepithelial lesion (LGSIL) 7/12/2006, 05/13/2008     Past Surgical History:   Procedure Laterality Date     HC CREATE EARDRUM OPENING,GEN ANESTH  1991    P.E. Tubes     ORTHOPEDIC SURGERY       PE TUBES  age 2     REPLACE DISK CERVICAL ANTERIOR N/A 4/9/2019    Procedure: 1.  C5-C6 anterior cervical diskectomy and disk replacement using a Mobi-C device 5 x 15 mm.  2.  Fluoroscopy.  3.  Microscopy.  ;  Surgeon: Aleksandar Culp MD;  Location: [facility] OR     skin cancer Left 06/2018    maligant melenoma     TONSILLECTOMY  age 23     Family History   Problem Relation Age of Onset     Breast Cancer Mother 40        Mastectomy in Early 40s     Hypertension Mother      Cerebrovascular Disease Mother         TIA     Blood Disease Mother         high platelets      Hypertension Father      Lipids Father      Hypertension Paternal Grandfather      Hypertension Paternal Grandmother      Neurologic Disorder Paternal Grandmother         Parkinson's     Thyroid Disease Maternal Uncle         thyroid cancer     Neurologic Disorder Other         MS     Multiple Sclerosis Paternal Aunt      Rheumatoid Arthritis Cousin      Lupus Cousin      Hashimoto's thyroiditis Cousin      Graves' disease Paternal Aunt      Diabetes No family hx of      Glaucoma No family hx of      Macular Degeneration No family hx of      Social History     Socioeconomic History     Marital status: Single     Spouse name: Not on file     Number of children: 0     Years of education: Not on file     Highest education level:  Not on file   Occupational History     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     Employer: [employer]   Social Needs     Financial resource strain: Not on file     Food insecurity:     Worry: Not on file     Inability: Not on file     Transportation needs:     Medical: Not on file     Non-medical: Not on file   Tobacco Use     Smoking status: Never Smoker     Smokeless tobacco: Never Used   Substance and Sexual Activity     Alcohol use: Yes     Alcohol/week: 0.0 standard drinks     Comment: once per month     Drug use: No     Sexual activity: Yes     Partners: Male     Birth control/protection: Condom, Pill   Lifestyle     Physical activity:     Days per week: Not on file     Minutes per session: Not on file     Stress: Not on file   Relationships     Social connections:     Talks on phone: Not on file     Gets together: Not on file     Attends Anglican service: Not on file     Active member of club or organization: Not on file     Attends meetings of clubs or organizations: Not on file     Relationship status: Not on file     Intimate partner violence:     Fear of current or ex partner: Not on file     Emotionally abused: Not on file     Physically abused: Not on file     Forced sexual activity: Not on file   Other Topics Concern     Parent/sibling w/ CABG, MI or angioplasty before 65F 55M? Not Asked   Social History Narrative    ** Merged History Encounter **            Current Outpatient Medications:      albuterol (2.5 MG/3ML) 0.083% neb solution, Take 1 vial (2.5 mg) by nebulization every 6 hours as needed for shortness of breath / dyspnea or wheezing, Disp: 50 vial, Rfl: 5     albuterol (PROAIR HFA/PROVENTIL HFA/VENTOLIN HFA) 108 (90 Base) MCG/ACT inhaler, Inhale 2-4 puffs into the lungs every 4 hours as needed for shortness of breath / dyspnea or wheezing, Disp: 1 Inhaler, Rfl: 11     Biotin 2500 MCG CAPS, Take 2,500 mcg by mouth daily , Disp: , Rfl:      budesonide (PULMICORT) 0.5  "MG/2ML neb solution, Take 0.5 mg by nebulization daily as needed, Disp: , Rfl:      Calcium Citrate-Vitamin D (CALCIUM CITRATE+D3 PETITES PO), Take 1 tablet by mouth daily, Disp: , Rfl:      drospirenone-ethinyl estradiol (GIANVI) 3-0.02 MG tablet, Take 1 tablet by mouth daily, Disp: 84 tablet, Rfl: 1     MELATONIN PO, Take 10 mg by mouth nightly as needed , Disp: , Rfl:      Multiple Vitamins-Minerals (HM MULTIVITAMIN ADULT GUMMY) CHEW, Take 1 tablet by mouth daily, Disp: , Rfl:      Omega-3 Fatty Acids (FISH OIL OMEGA-3 PO), Take 2 capsules by mouth daily, Disp: , Rfl:      Allergies   Allergen Reactions     Seasonal Allergies      Adhesive Tape Rash     Nickel Rash     Percocet [Oxycodone-Acetaminophen] Rash     Staples Rash     Metals-rash       Past medical, surgical, social and family history were reviewed and updated in [facility].      EXAM:  /75   Pulse 70   Ht 1.626 m (5' 4\")   Wt 62 kg (136 lb 11.2 oz)   LMP 11/21/2019   SpO2 97%   BMI 23.46 kg/m     BMI: Body mass index is 23.46 kg/m .  Constitutional: healthy, alert and no distress  Head: Normocephalic. No masses, lesions, tenderness or abnormalities  Neck: Neck supple. Trachea midline. No adenopathy. Thyroid symmetric, normal size.   Cardiovascular: RRR.   Respiratory: Negative.   Breast: Breasts reveal mild symmetric fibrocystic densities, but there are no dominant, discrete, fixed or suspicious masses found.  Gastrointestinal: Abdomen soft, non-tender, non-distended. No masses, organomegaly.  :  Vulva:  No external lesions, normal female hair distribution, no inguinal adenopathy.    Urethra:  Midline, non-tender, well supported, no discharge  Vagina:  Moist, pink, no abnormal discharge, no lesions  Uterus:  Normal size , non-tender, freely mobile  Ovaries:  No masses appreciated, non-tender, mobile  Rectal Exam: deferred  Musculoskeletal: extremities normal  Skin: no suspicious lesions or rashes  Psychiatric: Affect appropriate, " cooperative,mentation appears normal.     COUNSELING:   Reviewed preventive health counseling, as reflected in patient instructions       Contraception   reports that she has never smoked. She has never used smokeless tobacco.    Body mass index is 23.46 kg/m .    FRAX Risk Assessment    ASSESSMENT:  34 year old female with satisfactory annual exam  (Z01.419) Encounter for gynecological examination without abnormal finding  (primary encounter diagnosis)  Comment: OCP  Plan: norethindrone-ethinyl estradiol (MICROGESTIN         1/20) 1-20 MG-MCG tablet        We will try a different birth control pill she can let me know how this one is doing.  Reviewed lots to choose from.    (N87.0) Dysplasia of cervix, low grade (MERRY 1)  Comment: Last Pap with other high risk HPV  Plan: HPV High Risk Types DNA Cervical, Pap imaged         thin layer diagnostic with HPV (select HPV         order below)        We will let her know Pap results when available and discussed if HPV other high risk is still present, would recommend colposcopy since last one was in 2017.  Questions were answered.    (J45.20) Mild intermittent asthma without complication  Comment: Stable  Plan: albuterol (PROVENTIL) (2.5 MG/3ML) 0.083% neb         solution, albuterol (PROAIR HFA/PROVENTIL         HFA/VENTOLIN HFA) 108 (90 Base) MCG/ACT         inhaler, budesonide (PULMICORT) 0.5 MG/2ML neb         solution        Refills were done    (Z80.3) Family history of malignant neoplasm of breast  Comment: Mother at age 40  Plan: MA Screen Bilateral w/Ayo        We will schedule mammogram since overdue    (Z85.820) History of melanoma  Comment: Seeing dermatology  Plan: Continue skin checks    Karla Bae MD

## 2019-12-04 LAB
COPATH REPORT: NORMAL
PAP: NORMAL

## 2019-12-06 LAB
FINAL DIAGNOSIS: ABNORMAL
HPV HR 12 DNA CVX QL NAA+PROBE: POSITIVE
HPV16 DNA SPEC QL NAA+PROBE: NEGATIVE
HPV18 DNA SPEC QL NAA+PROBE: NEGATIVE
SPECIMEN DESCRIPTION: ABNORMAL
SPECIMEN SOURCE CVX/VAG CYTO: ABNORMAL

## 2020-02-28 ENCOUNTER — ANCILLARY PROCEDURE (OUTPATIENT)
Dept: MAMMOGRAPHY | Facility: CLINIC | Age: 35
End: 2020-02-28
Attending: OBSTETRICS & GYNECOLOGY
Payer: COMMERCIAL

## 2020-02-28 DIAGNOSIS — Z80.3 FAMILY HISTORY OF MALIGNANT NEOPLASM OF BREAST: ICD-10-CM

## 2020-02-28 PROCEDURE — 77067 SCR MAMMO BI INCL CAD: CPT | Mod: TC

## 2020-02-28 PROCEDURE — 77063 BREAST TOMOSYNTHESIS BI: CPT | Mod: TC

## 2020-05-01 ENCOUNTER — VIRTUAL VISIT (OUTPATIENT)
Dept: FAMILY MEDICINE | Facility: OTHER | Age: 35
End: 2020-05-01

## 2020-05-01 ENCOUNTER — OFFICE VISIT (OUTPATIENT)
Dept: URGENT CARE | Facility: URGENT CARE | Age: 35
End: 2020-05-01
Payer: COMMERCIAL

## 2020-05-01 VITALS
DIASTOLIC BLOOD PRESSURE: 75 MMHG | OXYGEN SATURATION: 96 % | SYSTOLIC BLOOD PRESSURE: 122 MMHG | RESPIRATION RATE: 18 BRPM | TEMPERATURE: 98.3 F | BODY MASS INDEX: 23.76 KG/M2 | WEIGHT: 138.4 LBS | HEART RATE: 83 BPM

## 2020-05-01 DIAGNOSIS — R07.0 THROAT PAIN: ICD-10-CM

## 2020-05-01 DIAGNOSIS — J03.90 TONSILLITIS: Primary | ICD-10-CM

## 2020-05-01 DIAGNOSIS — R21 RASH AND NONSPECIFIC SKIN ERUPTION: ICD-10-CM

## 2020-05-01 LAB
DEPRECATED S PYO AG THROAT QL EIA: NEGATIVE
SPECIMEN SOURCE: NORMAL
SPECIMEN SOURCE: NORMAL
STREP GROUP A PCR: NOT DETECTED

## 2020-05-01 PROCEDURE — 99214 OFFICE O/P EST MOD 30 MIN: CPT | Performed by: PHYSICIAN ASSISTANT

## 2020-05-01 PROCEDURE — 87651 STREP A DNA AMP PROBE: CPT | Performed by: PHYSICIAN ASSISTANT

## 2020-05-01 PROCEDURE — 40001204 ZZHCL STATISTIC STREP A RAPID: Performed by: PHYSICIAN ASSISTANT

## 2020-05-01 RX ORDER — CETIRIZINE HYDROCHLORIDE 10 MG/1
10 TABLET ORAL DAILY
Qty: 30 TABLET | Refills: 0 | Status: SHIPPED | OUTPATIENT
Start: 2020-05-01 | End: 2021-03-18

## 2020-05-01 RX ORDER — PENICILLIN V POTASSIUM 500 MG/1
500 TABLET, FILM COATED ORAL 2 TIMES DAILY
Qty: 20 TABLET | Refills: 0 | Status: SHIPPED | OUTPATIENT
Start: 2020-05-01 | End: 2020-07-14

## 2020-05-01 ASSESSMENT — ENCOUNTER SYMPTOMS
SINUS PAIN: 0
FATIGUE: 0
CHILLS: 0
RESPIRATORY NEGATIVE: 1
WHEEZING: 0
PALPITATIONS: 0
COUGH: 0
SHORTNESS OF BREATH: 0
FEVER: 0
GASTROINTESTINAL NEGATIVE: 1
SORE THROAT: 1
RHINORRHEA: 1
CARDIOVASCULAR NEGATIVE: 1
DIAPHORESIS: 1
SINUS PRESSURE: 0

## 2020-05-01 ASSESSMENT — PAIN SCALES - GENERAL: PAINLEVEL: MILD PAIN (3)

## 2020-05-01 NOTE — PROGRESS NOTES
Subjective   Cuca Jung is a 34 year old female who presents to clinic today for the following health issues:  HPI   RESPIRATORY SYMPTOMS    Duration: 3days    Description  rhinorrhea, sore throat, post nasal drainage, sweats and rash    Severity: moderate    Accompanying signs and symptoms: No cough, shortness of breath or wheezing.  No sinus congestion/pain/pressure.  No abdominal pain, n/v, constipation, diarrhea, bloody or black tarry stools.  No fevers.    History (predisposing factors):  No ill contacts.  No pmh of asthma.  Non-smoker.  Works as the hospital.      Precipitating or alleviating factors: None    Therapies tried and outcome:  rest and fluids antihistamine with minimal relief    Patient Active Problem List   Diagnosis     Encounter for surveillance of other contraceptive     Family history of malignant neoplasm of breast     CARDIOVASCULAR SCREENING; LDL GOAL LESS THAN 160     Mild intermittent asthma, uncomplicated     Family history of malignant neoplasm of breast     Dysplasia of cervix, low grade (MERRY 1)     Skin cancer (melanoma) (H)     S/P cervical disc replacement     Past Surgical History:   Procedure Laterality Date     HC CREATE EARDRUM OPENING,GEN ANESTH  1991    P.E. Tubes     ORTHOPEDIC SURGERY       PE TUBES  age 2     REPLACE DISK CERVICAL ANTERIOR N/A 4/9/2019    Procedure: 1.  C5-C6 anterior cervical diskectomy and disk replacement using a Mobi-C device 5 x 15 mm.  2.  Fluoroscopy.  3.  Microscopy.  ;  Surgeon: Aleksandar Culp MD;  Location: [facility] OR     skin cancer Left 06/2018    maligant melenoma     TONSILLECTOMY  age 23       Social History     Tobacco Use     Smoking status: Never Smoker     Smokeless tobacco: Never Used   Substance Use Topics     Alcohol use: Yes     Alcohol/week: 0.0 standard drinks     Frequency: Monthly or less     Drinks per session: 1 or 2     Binge frequency: Never     Comment: once per month     Family History   Problem Relation Age of Onset      Breast Cancer Mother 40        Mastectomy in Early 40s     Hypertension Mother      Cerebrovascular Disease Mother         TIA     Blood Disease Mother         high platelets      Hypertension Father      Lipids Father      Hypertension Paternal Grandfather      Hypertension Paternal Grandmother      Neurologic Disorder Paternal Grandmother         Parkinson's     Thyroid Disease Maternal Uncle         thyroid cancer     Neurologic Disorder Other         MS     Multiple Sclerosis Paternal Aunt      Rheumatoid Arthritis Cousin      Lupus Cousin      Hashimoto's thyroiditis Cousin      Graves' disease Paternal Aunt      Diabetes No family hx of      Glaucoma No family hx of      Macular Degeneration No family hx of          Current Outpatient Medications   Medication Sig Dispense Refill     albuterol (PROAIR HFA/PROVENTIL HFA/VENTOLIN HFA) 108 (90 Base) MCG/ACT inhaler Inhale 2-4 puffs into the lungs every 4 hours as needed for shortness of breath / dyspnea or wheezing 1 Inhaler 11     albuterol (PROVENTIL) (2.5 MG/3ML) 0.083% neb solution Take 1 vial (2.5 mg) by nebulization every 6 hours as needed for shortness of breath / dyspnea or wheezing 50 vial 5     budesonide (PULMICORT) 0.5 MG/2ML neb solution Take 2 mLs (0.5 mg) by nebulization daily as needed (short of breath) 1 Box 11     Calcium Citrate-Vitamin D (CALCIUM CITRATE+D3 PETITES PO) Take 1 tablet by mouth daily       drospirenone-ethinyl estradiol (GIANVI) 3-0.02 MG tablet Take 1 tablet by mouth daily 84 tablet 1     MELATONIN PO Take 10 mg by mouth nightly as needed        Multiple Vitamins-Minerals (HM MULTIVITAMIN ADULT GUMMY) CHEW Take 1 tablet by mouth daily       Omega-3 Fatty Acids (FISH OIL OMEGA-3 PO) Take 2 capsules by mouth daily       Biotin 2500 MCG CAPS Take 2,500 mcg by mouth daily        LORYNA 3-0.02 MG tablet Take 1 tablet by mouth daily 112 tablet 3     norethindrone-ethinyl estradiol (MICROGESTIN 1/20) 1-20 MG-MCG tablet Take 1 tablet by  mouth daily Active tablets only for prevention of menses 112 tablet 4     Allergies   Allergen Reactions     Seasonal Allergies      Adhesive Tape Rash     Nickel Rash     Percocet [Oxycodone-Acetaminophen] Rash     Staples Rash     Metals-rash     Reviewed and updated as needed this visit by Provider         Review of Systems   Constitutional: Positive for diaphoresis. Negative for chills, fatigue and fever.   HENT: Positive for postnasal drip, rhinorrhea and sore throat. Negative for congestion, ear discharge, ear pain, hearing loss, sinus pressure and sinus pain.    Respiratory: Negative.  Negative for cough, shortness of breath and wheezing.    Cardiovascular: Negative.  Negative for chest pain, palpitations and peripheral edema.   Gastrointestinal: Negative.    Allergic/Immunologic: Positive for environmental allergies.   All other systems reviewed and are negative.           Objective    /75 (BP Location: Left arm, Patient Position: Sitting, Cuff Size: Adult Regular)   Pulse 83   Temp 98.3  F (36.8  C) (Tympanic)   Resp 18   Wt 62.8 kg (138 lb 6.4 oz)   LMP 04/10/2020 (Exact Date)   SpO2 96%   BMI 23.76 kg/m    Body mass index is 23.76 kg/m .  Physical Exam  Vitals signs and nursing note reviewed.   Constitutional:       General: She is not in acute distress.     Appearance: Normal appearance. She is well-developed and normal weight. She is not ill-appearing.   HENT:      Head: Normocephalic and atraumatic.      Comments: TMs are intact without any erythema or bulging bilaterally.  Airway is patent.     Nose: Nose normal.      Mouth/Throat:      Lips: Pink.      Mouth: Mucous membranes are moist.      Pharynx: Uvula midline. Pharyngeal swelling and posterior oropharyngeal erythema present. No oropharyngeal exudate.      Tonsils: No tonsillar exudate. 2+ on the right. 2+ on the left.   Eyes:      General: No scleral icterus.     Extraocular Movements: Extraocular movements intact.       Conjunctiva/sclera: Conjunctivae normal.      Pupils: Pupils are equal, round, and reactive to light.   Neck:      Musculoskeletal: Normal range of motion and neck supple.      Thyroid: No thyromegaly.   Cardiovascular:      Rate and Rhythm: Normal rate and regular rhythm.      Pulses: Normal pulses.      Heart sounds: Normal heart sounds, S1 normal and S2 normal. No murmur. No friction rub. No gallop.    Pulmonary:      Effort: Pulmonary effort is normal. No accessory muscle usage, respiratory distress or retractions.      Breath sounds: Normal breath sounds and air entry. No stridor. No decreased breath sounds, wheezing, rhonchi or rales.   Lymphadenopathy:      Head:      Right side of head: Tonsillar adenopathy present.      Left side of head: Tonsillar adenopathy present.      Cervical: No cervical adenopathy.   Skin:     General: Skin is warm and dry.      Findings: Rash present. No bruising or erythema. Rash is macular (present over her face, arms) and papular. Rash is not crusting, nodular, purpuric, pustular, scaling, urticarial or vesicular.      Nails: There is no clubbing.     Neurological:      Mental Status: She is alert and oriented to person, place, and time.   Psychiatric:         Mood and Affect: Mood normal.         Behavior: Behavior normal.         Thought Content: Thought content normal.         Judgment: Judgment normal.     Diagnostic Test Results:  Labs reviewed in Epic  Results for orders placed or performed in visit on 05/01/20 (from the past 24 hour(s))   Streptococcus A Rapid Scr w Reflx to PCR    Specimen: Throat   Result Value Ref Range    Strep Specimen Description Throat     Streptococcus Group A Rapid Screen Negative NEG^Negative             Assessment & Plan   Tonsillitis:  RST is negative, will send for throat culture.  Will treat for tonsillitis with [id number] based on H&P.  Recommend tylenol/ibuprofen prn pain/fever, warm salt water gargles, lozenges or cough drops.   Recheck in clinic if symptoms worsen or if symptoms do not improve.    -     penicillin V (VEETID) 500 MG tablet; Take 1 tablet (500 mg) by mouth 2 times daily for 10 days    Throat pain  -     Streptococcus A Rapid Scr w Reflx to PCR  -     Group A Streptococcus PCR Throat Swab    Rash and nonspecific skin eruption:  ?contact/allergic dermatitis vs viral exanthem vs strep rash.  Recommended zyrtec for itching.  Avoid triggers and irritating agents.  Avoid scratching to present secondary infection.  RTC if worsening rash or if she develops redness, swelling, drainage or fevers.   -     cetirizine (ZYRTEC) 10 MG tablet; Take 1 tablet (10 mg) by mouth daily           Hellen Grimaldo PA-C  [facility]

## 2020-05-01 NOTE — PROGRESS NOTES
"Date: 2020 14:15:19  Clinician: Manav Dukes  Clinician NPI: [id number]  Patient: JOCELINE MANZANO  Patient : 1985  Patient Address: [address] [place] [id number]  Patient Phone: [phone]  Visit Protocol: URI  Patient Summary:  JOCELINE is a 34 year old ( : 1985 ) female who initiated a Visit for COVID-19 (Coronavirus) evaluation and screening. When asked the question \"Please sign me up to receive news, health information and promotions from [facility].\", JOCELINE responded \"No\".    JOCELINE states her symptoms started gradually 3-4 days ago. After her symptoms started, they improved and then got worse again.   Her symptoms consist of malaise, rhinitis, a sore throat, nasal congestion, and enlarged lymph nodes. She is experiencing difficulty breathing due to nasal congestion but she is not short of breath. JOCELINE also feels feverish.   Symptom details     Nasal secretions: The color of her mucus is yellow and clear.    Sore throat: JOCELINE reports having moderate throat pain (4-6 on a 10 point pain scale), does not have exudate on her tonsils, and can swallow liquids. The lymph nodes in her neck are enlarged. A rash has appeared on the skin since the sore throat started. JOCELINE uploaded photos of her rash (see below).     Temperature: Her current temperature is 98.6 degrees Fahrenheit.      JOCELINE denies having myalgias, facial pain or pressure, cough, vomiting, nausea, teeth pain, ageusia, anosmia, diarrhea, ear pain, headache, wheezing, and chills. She also denies taking antibiotic medication for the symptoms, having recent facial or sinus surgery in the past 60 days, and having a sinus infection within the past year.   Precipitating events  Within the past week, JOCELINE has not been exposed to someone with strep throat.   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   JOCELINE has not had a close contact with a laboratory-confirmed COVID-19 patient within 14 days of symptom onset. She also has not had a close contact with a suspected COVID-19 patient within 14 days of symptom onset.   Pertinent medical history  JOCELINE does not get yeast infections when she takes antibiotics.   JOCELINE does not need a return to work/school note.   Weight: 137 lbs   JOCELINE does not smoke or use smokeless tobacco.   She denies pregnancy and denies breastfeeding. She has menstruated in the past month.   Additional information as reported by the patient (free text): Runny nose for 1 week rash on face and arms x 3 days sore throat and glands x2 days. Increased fatigue other changes includes melanoma,asthma   Weight: 137 lbs    MEDICATIONS: albuterol sulfate inhalation, Complete Multivitamin-Multimineral oral, Loryna (28) oral, ALLERGIES: Percocet, nickel, adhesive  Clinician Response:  Dear JOCELINE,   Your symptoms show that you may have coronavirus (COVID-19). This illness can cause fever, cough and trouble breathing. Many people get a mild case and get better on their own. Some people can get very sick.   Will I be tested for COVID-19?  We would recommend you be tested for coronavirus. Here is how to get that scheduled:   Call [phone]. Tell them you were [facility] have [facility]-19 test. You will be scheduled at one of our [facility] testing locations (drive-up). Please have your [facility] visit information ready when you call including your visit ID number to verify you were referred.    How can I protect others in the meantime?  First, stay home and away from others (self-isolate) until:   You've had no fever---and no medicine that reduces fever---for 3 full days (78  hours). And...    Your other symptoms have gotten better. For example, your cough or breathing has improved. And...    At least 7 days have passed since your symptoms started.   During this time:   Don't go to work, school or anywhere else.     Stay away from others in your home. No hugging, kissing or shaking hands.    Don't let anyone visit.    Cover your mouth and nose with a mask, tissue or wash cloth to avoid spreading germs.    Wash your hands and face often. Use soap and water.   How can I take care of myself?  1.Take Tylenol (acetaminophen) for fever or pain. If you have liver or kidney problems, ask your family doctor if it's okay to take Tylenol.   Adults can take either:    650 mg (two 325 mg pills) every 4 to 6 hours, or...    1,000 mg (two 500 mg pills) every 8 hours as needed.     Note: Don't take more than 3,000 mg in one day.   For children, check the Tylenol bottle for the right dose. The dose is based on the child's age or weight.  2.If you have other health problems (like cancer, heart failure, an organ transplant or severe kidney disease): Call your specialty clinic if you don't feel better in the next 2 days.  3.Know when to call 911: If your breathing is so bad that it keeps you from doing normal activities, call 911 or go to the emergency room. Tell them that you've been staying home and may have COVID-19.  4.Sign up for [employer]. We know it's scary to hear that you might have COVID-19. We want to track your symptoms to make sure you're okay over the next 2 weeks. Please look for an email from [employer]---this is a free, online program that we'll use to keep in touch. To sign up, follow the link in the email. Learn more at [email or web address].  Where can I get more information?  To learn more about COVID-19 and how to care for yourself at home, please visit the CDC website at https://www.cdc.gov/coronavirus/2019-ncov/about/steps-when-sick.html.  For more about your care at  [facility], please visit [email or web address]     Diagnosis: Acute upper respiratory infection, unspecified  Diagnosis ICD: J06.9

## 2020-05-02 ENCOUNTER — RESULTS ONLY (OUTPATIENT)
Dept: LAB | Age: 35
End: 2020-05-02

## 2020-05-02 ENCOUNTER — OFFICE VISIT (OUTPATIENT)
Dept: URGENT CARE | Facility: URGENT CARE | Age: 35
End: 2020-05-02
Payer: COMMERCIAL

## 2020-05-02 DIAGNOSIS — Z20.822 SUSPECTED 2019 NOVEL CORONAVIRUS INFECTION: Primary | ICD-10-CM

## 2020-05-02 PROCEDURE — 99207 ZZC NO CHARGE NURSE ONLY: CPT

## 2020-05-03 LAB
SARS-COV-2 RNA SPEC QL NAA+PROBE: NOT DETECTED
SPECIMEN SOURCE: NORMAL

## 2020-05-04 DIAGNOSIS — Z30.011 ORAL CONTRACEPTIVE PRESCRIBED: ICD-10-CM

## 2020-05-04 RX ORDER — DROSPIRENONE AND ETHINYL ESTRADIOL TABLETS 0.02-3(28)
1 KIT ORAL DAILY
Qty: 112 TABLET | Refills: 3 | Status: SHIPPED | OUTPATIENT
Start: 2020-05-04 | End: 2020-08-18

## 2020-05-04 NOTE — TELEPHONE ENCOUNTER
Last OV was 12/2/19. Up to date. Pt was scheduled 5/1 for colpo, but needs to be rescheduled due to COVID19. Refill sent to pharmacy on 3/2/2020 and doesn't need a new refill. Fax sent back to pharmacy.   Norma Huston, RN-BSN

## 2020-05-04 NOTE — TELEPHONE ENCOUNTER
Pharmacy sent fax back they do not have anything on file from 3/2/20. Refill sent to pharmacy.   Norma Huston, RN-BSN

## 2020-07-14 ENCOUNTER — OFFICE VISIT (OUTPATIENT)
Dept: OPTOMETRY | Facility: CLINIC | Age: 35
End: 2020-07-14
Payer: COMMERCIAL

## 2020-07-14 DIAGNOSIS — Z01.00 EXAMINATION OF EYES AND VISION: Primary | ICD-10-CM

## 2020-07-14 DIAGNOSIS — H52.13 MYOPIA OF BOTH EYES: ICD-10-CM

## 2020-07-14 PROCEDURE — 92015 DETERMINE REFRACTIVE STATE: CPT | Performed by: OPTOMETRIST

## 2020-07-14 PROCEDURE — 92014 COMPRE OPH EXAM EST PT 1/>: CPT | Performed by: OPTOMETRIST

## 2020-07-14 ASSESSMENT — REFRACTION_MANIFEST
OD_SPHERE: -0.50
OS_SPHERE: -0.50

## 2020-07-14 ASSESSMENT — EXTERNAL EXAM - LEFT EYE: OS_EXAM: NORMAL

## 2020-07-14 ASSESSMENT — TONOMETRY
OS_IOP_MMHG: 17
OD_IOP_MMHG: 13
IOP_METHOD: TONOPEN

## 2020-07-14 ASSESSMENT — REFRACTION_WEARINGRX
OS_ADD: +1.00
OD_ADD: +1.00
OS_SPHERE: -0.25
OD_SPHERE: -0.50

## 2020-07-14 ASSESSMENT — VISUAL ACUITY
OD_SC: 20/20
OS_SC: 20/20
OS_SC: 20/20
METHOD: SNELLEN - LINEAR
OD_SC+: -1
OD_SC: 20/20

## 2020-07-14 ASSESSMENT — EXTERNAL EXAM - RIGHT EYE: OD_EXAM: NORMAL

## 2020-07-14 ASSESSMENT — CUP TO DISC RATIO
OD_RATIO: 0.2
OS_RATIO: 0.2

## 2020-07-14 ASSESSMENT — SLIT LAMP EXAM - LIDS
COMMENTS: NORMAL
COMMENTS: NORMAL

## 2020-07-14 ASSESSMENT — CONF VISUAL FIELD
OD_NORMAL: 1
OS_NORMAL: 1

## 2020-07-14 NOTE — PROGRESS NOTES
Chief Complaint   Patient presents with     Annual Eye Exam     Patient had melanoma on leg 2018      Accompanied by self  Last Eye Exam: 1-  Dilated Previously: Yes,drops made her dizzy on her last visit     What are you currently using to see?  does not use glasses or contacts       Distance Vision Acuity: Noticed gradual change in both eyes    Near Vision Acuity: Satisfied with vision while reading  unaided    Eye Comfort: good  Do you use eye drops? : No  Occupation or Hobbies: [employer]          Medical, surgical and family histories reviewed and updated 7/14/2020.       OBJECTIVE: See Ophthalmology exam    ASSESSMENT:    ICD-10-CM    1. Examination of eyes and vision  Z01.00 EYE EXAM (SIMPLE-NONBILLABLE)     REFRACTION   2. Myopia of both eyes  H52.13 EYE EXAM (SIMPLE-NONBILLABLE)     REFRACTION      PLAN:     Patient Instructions   Eyeglass prescription given.  Glasses for distance vision only.    Be sure to take frequent breaks(every 20 minutes for 20 seconds look as far away as possible) when on the computer or your device for long periods of time and physically get away from the screen at least once an hour.    Return in 1 year for a complete eye exam or sooner if needed.    José Miguel Blackwell, OD

## 2020-07-14 NOTE — LETTER
7/14/2020         RE: Cuca Jung  [address] [facility] [id number]        Dear Colleague,    Thank you for referring your patient, Cuca Jung, to the [facility]. Please see a copy of my visit note below.    Chief Complaint   Patient presents with     Annual Eye Exam     Patient had melanoma on leg 2018      Accompanied by self  Last Eye Exam: 1-  Dilated Previously: Yes,drops made her dizzy on her last visit     What are you currently using to see?  does not use glasses or contacts       Distance Vision Acuity: Noticed gradual change in both eyes    Near Vision Acuity: Satisfied with vision while reading  unaided    Eye Comfort: good  Do you use eye drops? : No  Occupation or Hobbies: [employer]          Medical, surgical and family histories reviewed and updated 7/14/2020.       OBJECTIVE: See Ophthalmology exam    ASSESSMENT:    ICD-10-CM    1. Examination of eyes and vision  Z01.00 EYE EXAM (SIMPLE-NONBILLABLE)     REFRACTION   2. Myopia of both eyes  H52.13 EYE EXAM (SIMPLE-NONBILLABLE)     REFRACTION      PLAN:     Patient Instructions   Eyeglass prescription given.  Glasses for distance vision only.    Be sure to take frequent breaks(every 20 minutes for 20 seconds look as far away as possible) when on the computer or your device for long periods of time and physically get away from the screen at least once an hour.    Return in 1 year for a complete eye exam or sooner if needed.    José Miguel Blackwell OD           Again, thank you for allowing me to participate in the care of your patient.        Sincerely,        José Miguel Blackwell OD

## 2020-07-14 NOTE — PATIENT INSTRUCTIONS
Eyeglass prescription given.  Glasses for distance vision only.    Be sure to take frequent breaks(every 20 minutes for 20 seconds look as far away as possible) when on the computer or your device for long periods of time and physically get away from the screen at least once an hour.    Return in 1 year for a complete eye exam or sooner if needed.    José Miguel Blackwell, EUSEBIO    The affects of the dilating drops last for 4- 6 hours.  You will be more sensitive to light and vision will be blurry up close.  Mydriatic sunglasses were given if needed.      Optometry Providers       Clinic Locations                                 Telephone Number   Dr. Vicki Garber [phone]     [facility] Hours:                [facility] Hours:       [facility] Hours:   [address]   [address]      [address]  [place] [address]   [place] [address]    [place] [address]  Phone: [phone]                    Phone: [phone]     Phone: [phone]                      Monday 8:00-7:00                          Monday 8:00-7:00                          Monday 8:00-7:00              Tuesday 8:00-6:00                          Tuesday 8:00-7:00                          Tuesday 8:00-7:00              Wednesday 8:00-6:00                  Wednesday 8:00-7:00                   Wednesday 8:00-7:00      Thursday 8:00-6:00                        Thursday 8:00-7:00                         Thursday 8:00-7:00            Friday 8:00-5:00                              Friday 8:00-5:00                              Friday 8:00-5:00    [facility] Hours:   [address] [place] [id number] [phone]    Monday 8:00-7:00  Tuesday 8:00-7:00  Wednesday 8:00-7:00  Thursday 8:00-7:00  Friday 8:00-5:00  Please log on to [email or web address] to order your contact lenses.  The link is found on the Eye Care and  Vision Services page.  As always, Thank you for trusting us with your health care needs!

## 2020-08-07 ENCOUNTER — PATIENT OUTREACH (OUTPATIENT)
Dept: OBGYN | Facility: CLINIC | Age: 35
End: 2020-08-07

## 2020-08-07 DIAGNOSIS — N87.0 DYSPLASIA OF CERVIX, LOW GRADE (CIN 1): ICD-10-CM

## 2020-08-07 NOTE — TELEPHONE ENCOUNTER
7/06 LSIL pap -- [place] Bx: MERRY 1  5/07 NIL pap  5/08 LSIL pap   6/08 [place] ECC: neg  1/09 ASCUS pap, + HR HPV (type unknown)   7/09, 9/11, 1/13, 7/14 - all NIL paps  8/25/17 ASCUS pap, + HR HPV (not 16/18). Plan colp due by 11/25/17.  10/13/17: [place] Bx atypical squamous epithelium of undetermined significance. Plan cotest in 1 year.  11/07/18: ASCUS Pap,+ HR HPV (not 16 or 18) result. Plan cotest in 1 year per provider.   12/2/19 NIL, +HR HPV, not 16/18. Plan [place]  05/06/20 3mo colp not done, chart and tracking updated for 6mo colp/pap.   08/07/20: Plan cotest due by 12/2/20. See mychart encounter from 08/6/20. Provider advised the pt.

## 2020-08-18 DIAGNOSIS — Z30.011 ORAL CONTRACEPTIVE PRESCRIBED: ICD-10-CM

## 2020-08-18 RX ORDER — DROSPIRENONE AND ETHINYL ESTRADIOL TABLETS 0.02-3(28)
1 KIT ORAL DAILY
Qty: 112 TABLET | Refills: 2 | Status: SHIPPED | OUTPATIENT
Start: 2020-08-18 | End: 2021-01-06

## 2020-11-02 ENCOUNTER — OFFICE VISIT (OUTPATIENT)
Dept: FAMILY MEDICINE | Facility: CLINIC | Age: 35
End: 2020-11-02
Payer: COMMERCIAL

## 2020-11-02 VITALS
BODY MASS INDEX: 22.57 KG/M2 | HEART RATE: 70 BPM | HEIGHT: 64 IN | WEIGHT: 132.2 LBS | DIASTOLIC BLOOD PRESSURE: 80 MMHG | OXYGEN SATURATION: 98 % | SYSTOLIC BLOOD PRESSURE: 118 MMHG | TEMPERATURE: 97.8 F

## 2020-11-02 DIAGNOSIS — E61.1 IRON DEFICIENCY: ICD-10-CM

## 2020-11-02 DIAGNOSIS — R53.83 FATIGUE, UNSPECIFIED TYPE: Primary | ICD-10-CM

## 2020-11-02 DIAGNOSIS — Z78.9 VEGAN DIET: ICD-10-CM

## 2020-11-02 DIAGNOSIS — Z13.6 CARDIOVASCULAR SCREENING; LDL GOAL LESS THAN 160: ICD-10-CM

## 2020-11-02 LAB
ALBUMIN SERPL-MCNC: 3.5 G/DL (ref 3.4–5)
ALP SERPL-CCNC: 60 U/L (ref 40–150)
ALT SERPL W P-5'-P-CCNC: 20 U/L (ref 0–50)
ANION GAP SERPL CALCULATED.3IONS-SCNC: 7 MMOL/L (ref 3–14)
AST SERPL W P-5'-P-CCNC: 16 U/L (ref 0–45)
BASOPHILS # BLD AUTO: 0 10E9/L (ref 0–0.2)
BASOPHILS NFR BLD AUTO: 0.4 %
BILIRUB SERPL-MCNC: 0.2 MG/DL (ref 0.2–1.3)
BUN SERPL-MCNC: 17 MG/DL (ref 7–30)
CALCIUM SERPL-MCNC: 8.7 MG/DL (ref 8.5–10.1)
CHLORIDE SERPL-SCNC: 107 MMOL/L (ref 94–109)
CHOLEST SERPL-MCNC: 241 MG/DL
CO2 SERPL-SCNC: 25 MMOL/L (ref 20–32)
CREAT SERPL-MCNC: 0.75 MG/DL (ref 0.52–1.04)
DIFFERENTIAL METHOD BLD: NORMAL
EOSINOPHIL # BLD AUTO: 0.1 10E9/L (ref 0–0.7)
EOSINOPHIL NFR BLD AUTO: 1.8 %
ERYTHROCYTE [DISTWIDTH] IN BLOOD BY AUTOMATED COUNT: 11.5 % (ref 10–15)
GFR SERPL CREATININE-BSD FRML MDRD: >90 ML/MIN/{1.73_M2}
GLUCOSE SERPL-MCNC: 100 MG/DL (ref 70–99)
HCT VFR BLD AUTO: 40.5 % (ref 35–47)
HDLC SERPL-MCNC: 76 MG/DL
HGB BLD-MCNC: 13.3 G/DL (ref 11.7–15.7)
LDLC SERPL CALC-MCNC: 133 MG/DL
LYMPHOCYTES # BLD AUTO: 2.5 10E9/L (ref 0.8–5.3)
LYMPHOCYTES NFR BLD AUTO: 43.8 %
MAGNESIUM SERPL-MCNC: 2.2 MG/DL (ref 1.6–2.3)
MCH RBC QN AUTO: 28.3 PG (ref 26.5–33)
MCHC RBC AUTO-ENTMCNC: 32.8 G/DL (ref 31.5–36.5)
MCV RBC AUTO: 86 FL (ref 78–100)
MONOCYTES # BLD AUTO: 0.5 10E9/L (ref 0–1.3)
MONOCYTES NFR BLD AUTO: 9.3 %
NEUTROPHILS # BLD AUTO: 2.5 10E9/L (ref 1.6–8.3)
NEUTROPHILS NFR BLD AUTO: 44.7 %
NONHDLC SERPL-MCNC: 165 MG/DL
PLATELET # BLD AUTO: 303 10E9/L (ref 150–450)
POTASSIUM SERPL-SCNC: 3.8 MMOL/L (ref 3.4–5.3)
PROT SERPL-MCNC: 7.7 G/DL (ref 6.8–8.8)
RBC # BLD AUTO: 4.7 10E12/L (ref 3.8–5.2)
SODIUM SERPL-SCNC: 139 MMOL/L (ref 133–144)
TRIGL SERPL-MCNC: 160 MG/DL
TSH SERPL DL<=0.005 MIU/L-ACNC: 1.68 MU/L (ref 0.4–4)
VIT B12 SERPL-MCNC: 491 PG/ML (ref 193–986)
WBC # BLD AUTO: 5.7 10E9/L (ref 4–11)

## 2020-11-02 PROCEDURE — 80061 LIPID PANEL: CPT | Performed by: NURSE PRACTITIONER

## 2020-11-02 PROCEDURE — 80050 GENERAL HEALTH PANEL: CPT | Performed by: NURSE PRACTITIONER

## 2020-11-02 PROCEDURE — 83735 ASSAY OF MAGNESIUM: CPT | Performed by: NURSE PRACTITIONER

## 2020-11-02 PROCEDURE — 82306 VITAMIN D 25 HYDROXY: CPT | Performed by: NURSE PRACTITIONER

## 2020-11-02 PROCEDURE — 99214 OFFICE O/P EST MOD 30 MIN: CPT | Performed by: NURSE PRACTITIONER

## 2020-11-02 PROCEDURE — 83550 IRON BINDING TEST: CPT | Performed by: NURSE PRACTITIONER

## 2020-11-02 PROCEDURE — 83540 ASSAY OF IRON: CPT | Performed by: NURSE PRACTITIONER

## 2020-11-02 PROCEDURE — 82728 ASSAY OF FERRITIN: CPT | Performed by: NURSE PRACTITIONER

## 2020-11-02 PROCEDURE — 82607 VITAMIN B-12: CPT | Performed by: NURSE PRACTITIONER

## 2020-11-02 PROCEDURE — 36415 COLL VENOUS BLD VENIPUNCTURE: CPT | Performed by: NURSE PRACTITIONER

## 2020-11-02 ASSESSMENT — PAIN SCALES - GENERAL: PAINLEVEL: MODERATE PAIN (5)

## 2020-11-02 ASSESSMENT — MIFFLIN-ST. JEOR: SCORE: 1284.66

## 2020-11-02 NOTE — PROGRESS NOTES
"Subjective     Cuca Jung is a 34 year old female who presents to clinic today for the following health issues:    HPI          Pleasant 34 year old female presents to discuss fatigue. She typically follows a vegan/vegetarian diet on and off. She has been doing it now for the past year. She comes in today to request lab work. She has previously been diagnosed with chronic fatigue syndrome but also knows that she sometimes has some nutritional deficiencies. Goes to the gym often and is very active. Some days no energy to get out of bed. No alcohol or drug use. No anxiety or depression. Not pregnant. Reports fatigue hasn't been this bad in years.      Review of Systems   Constitutional, HEENT, cardiovascular, pulmonary, GI, , musculoskeletal, neuro, skin, endocrine and psych systems are negative, except as otherwise noted.      Objective    /80 (BP Location: Left arm, Patient Position: Chair, Cuff Size: Adult Regular)   Pulse 70   Temp 97.8  F (36.6  C) (Tympanic)   Ht 1.626 m (5' 4\")   Wt 60 kg (132 lb 3.2 oz)   LMP 10/07/2020 (Approximate)   SpO2 98%   Breastfeeding No   BMI 22.69 kg/m    Body mass index is 22.69 kg/m .  Physical Exam   GENERAL: healthy, alert and no distress  NECK: no adenopathy, no asymmetry, masses, or scars and thyroid normal to palpation  RESP: lungs clear to auscultation - no rales, rhonchi or wheezes  CV: regular rate and rhythm, normal S1 S2, no S3 or S4, no murmur, click or rub, no peripheral edema and peripheral pulses strong  ABDOMEN: soft, nontender, no hepatosplenomegaly, no masses and bowel sounds normal  MS: no gross musculoskeletal defects noted, no edema  PSYCH: mentation appears normal, affect normal/bright    Results for orders placed or performed in visit on 11/02/20   Comprehensive metabolic panel (BMP + Alb, Alk Phos, ALT, AST, Total. Bili, TP)     Status: Abnormal   Result Value Ref [facility]    Sodium 139 133 - 144 mmol/L    Potassium 3.8 3.4 - 5.3 mmol/L    " Chloride 107 94 - 109 mmol/L    Carbon Dioxide 25 20 - 32 mmol/L    Anion Gap 7 3 - 14 mmol/L    Glucose 100 (H) 70 - 99 mg/dL    Urea Nitrogen 17 7 - 30 mg/dL    Creatinine 0.75 0.52 - 1.04 mg/dL    GFR Estimate >90 >60 mL/min/[1.73_m2]    GFR Estimate If Black >90 >60 mL/min/[1.73_m2]    Calcium 8.7 8.5 - 10.1 mg/dL    Bilirubin Total 0.2 0.2 - 1.3 mg/dL    Albumin 3.5 3.4 - 5.0 g/dL    Protein Total 7.7 6.8 - 8.8 g/dL    Alkaline Phosphatase 60 40 - 150 U/L    ALT 20 0 - 50 U/L    AST 16 0 - 45 U/L   CBC with platelets differential     Status: None   Result Value Ref [facility]    WBC 5.7 4.0 - 11.0 10e9/L    RBC Count 4.70 3.8 - 5.2 10e12/L    Hemoglobin 13.3 11.7 - 15.7 g/dL    Hematocrit 40.5 35.0 - 47.0 %    MCV 86 78 - 100 fl    MCH 28.3 26.5 - 33.0 pg    MCHC 32.8 31.5 - 36.5 g/dL    RDW 11.5 10.0 - 15.0 %    Platelet Count 303 150 - 450 10e9/L    % Neutrophils 44.7 %    % Lymphocytes 43.8 %    % Monocytes 9.3 %    % Eosinophils 1.8 %    % Basophils 0.4 %    Absolute Neutrophil 2.5 1.6 - 8.3 10e9/L    Absolute Lymphocytes 2.5 0.8 - 5.3 10e9/L    Absolute Monocytes 0.5 0.0 - 1.3 10e9/L    Absolute Eosinophils 0.1 0.0 - 0.7 10e9/L    Absolute Basophils 0.0 0.0 - 0.2 10e9/L    Diff Method Automated Method    Ferritin     Status: Abnormal   Result Value Ref [facility]    Ferritin 7 (L) 12 - 150 ng/mL   Iron and iron binding capacity     Status: Abnormal   Result Value Ref [facility]    Iron 70 35 - 180 ug/dL    Iron Binding Cap 490 (H) 240 - 430 ug/dL    Iron Saturation Index 14 (L) 15 - 46 %   Vitamin B12     Status: None   Result Value Ref [facility]    Vitamin B12 491 193 - 986 pg/mL   Vitamin D Deficiency     Status: None   Result Value Ref [facility]    Vitamin D Deficiency screening 71 20 - 75 ug/L   TSH with free T4 reflex     Status: None   Result Value Ref [facility]    TSH 1.68 0.40 - 4.00 mU/L   Magnesium     Status: None   Result Value Ref [facility]    Magnesium 2.2 1.6 - 2.3 mg/dL   Lipid panel reflex to direct LDL Non-fasting      Status: Abnormal   Result Value Ref [facility]    Cholesterol 241 (H) <200 mg/dL    Triglycerides 160 (H) <150 mg/dL    HDL Cholesterol 76 >49 mg/dL    LDL Cholesterol Calculated 133 (H) <100 mg/dL    Non HDL Cholesterol 165 (H) <130 mg/dL           Assessment & Plan     Fatigue, unspecified type  Will get labs. Not pregnant per patient report.  - Comprehensive metabolic panel (BMP + Alb, Alk Phos, ALT, AST, Total. Bili, TP)  - CBC with platelets differential  - Ferritin  - Iron and iron binding capacity  - Vitamin B12  - Vitamin D Deficiency  - TSH with free T4 reflex  - Magnesium    CARDIOVASCULAR SCREENING; LDL GOAL LESS THAN 160  - Lipid panel reflex to direct LDL Non-fasting    Iron deficiency  Labs show iron deficiency. Will start the below - take with vit C or OJ. Recheck 2-3 months.   - ferrous sulfate (FEROSUL) 325 (65 Fe) MG tablet; Take 1 tablet (325 mg) by mouth daily (with breakfast)    Vegan diet  Take MVI.          See Patient Instructions    Return in about 2 months (around 1/2/2021).     The benefits, risks and potential side effects were discussed in detail. Black box warnings discussed as relevant. All patient questions were answered. The patient was instructed to follow up immediately if any adverse reactions develop.    Return precautions discussed, including when to seek urgent/emergent care.    Patient verbalizes understanding and agrees with plan of care. Patient stable for discharge.      CODY Willson CNP  [facility]    This visit took place during the COVID 19 global pandemic.   PPE worn during the visit included: surgical mask and face shield by me and mask by patient

## 2020-11-03 PROBLEM — Z78.9 VEGAN DIET: Status: ACTIVE | Noted: 2020-11-03

## 2020-11-03 PROBLEM — E61.1 IRON DEFICIENCY: Status: ACTIVE | Noted: 2020-11-03

## 2020-11-03 LAB
DEPRECATED CALCIDIOL+CALCIFEROL SERPL-MC: 71 UG/L (ref 20–75)
FERRITIN SERPL-MCNC: 7 NG/ML (ref 12–150)
IRON SATN MFR SERPL: 14 % (ref 15–46)
IRON SERPL-MCNC: 70 UG/DL (ref 35–180)
TIBC SERPL-MCNC: 490 UG/DL (ref 240–430)

## 2020-11-03 RX ORDER — FERROUS SULFATE 325(65) MG
325 TABLET ORAL
Qty: 90 TABLET | Refills: 1 | Status: SHIPPED | OUTPATIENT
Start: 2020-11-03 | End: 2021-08-31

## 2020-11-11 ENCOUNTER — E-VISIT (OUTPATIENT)
Dept: URGENT CARE | Facility: URGENT CARE | Age: 35
End: 2020-11-11
Payer: COMMERCIAL

## 2020-11-11 DIAGNOSIS — Z20.822 SUSPECTED COVID-19 VIRUS INFECTION: Primary | ICD-10-CM

## 2020-11-11 PROCEDURE — 99421 OL DIG E/M SVC 5-10 MIN: CPT | Performed by: PHYSICIAN ASSISTANT

## 2020-11-12 NOTE — PATIENT INSTRUCTIONS
"  Dear Cuca Jung,    Your symptoms show that you may have coronavirus (COVID-19). This illness can cause fever, cough and trouble breathing. Many people get a mild case and get better on their own. Some people can get very sick.    Will I be tested for COVID-19?  We would like to test you for this virus. I have placed an order for this test and please call [phone] to schedule testing. [employer] employees please call [phone]. [employer] employees call [phone].     When it's time for your COVID test:  Stay at least 6 feet away from others. (If someone will drive you to your test, stay in the backseat, as far away from the  as you can.)  Cover your mouth and nose with a mask, tissue or washcloth.  Go straight to the testing site. Don't make any stops on the way there or back.    Starting now:     Do not go to work.   o If you receive a negative COVID-19 test result and were NOT exposed to someone with a known positive COVID-19 test, you can return to work once you're free of fever for 24 hours without fever-reducing medication and your symptoms are improving or resolved.  o If you receive a positive COVID-19 test result, you must be cleared by Employee Occupational Health and Safety to return to work.   o If you were exposed to someone who has tested positive for COVID-19, you can return to work 14 days after your last contact with the positive individual, provided you do not have symptoms at all during that time. In some cases, your manager may ask you to come back sooner than 14 days.     During this time, don't leave the house except for testing or medical care.  o Stay in your own room, even for meals. Use your own bathroom if you can.  o Stay away from others in your home. No hugging, kissing or shaking hands. No visitors.  o Don't go to work, school or anywhere else.    Clean \"high touch\" surfaces often (doorknobs, counters, handles, etc.). Use a household cleaning spray or " wipes. You'll find a full list of  on the EPA website: www.epa.gov/pesticide-registration/list-n-disinfectants-use-against-sars-cov-2.    Cover your mouth and nose with a mask, tissue or washcloth to avoid spreading germs.    Wash your hands and face often. Use soap and water.    People in these groups are at risk for severe illness due to COVID-19:  o People 65 years and older  o People who live in a nursing home or long-term care facility  o People with chronic disease (lung, heart, cancer, diabetes, kidney, liver, immunologic)  o People who have a weakened immune system, including those who:  - Are in cancer treatment  - Take medicine that weakens the immune system, such as corticosteroids  - Had a bone marrow or organ transplant  - Have an immune deficiency  - Have poorly controlled HIV or AIDS  - Are obese (body mass index of 40 or higher)  - Smoke regularly      Caregivers should wear gloves while washing dishes, handling laundry and cleaning bedrooms and bathrooms.    Use caution when washing and drying laundry: Don't shake dirty laundry, and use the warmest water setting that you can.    For more tips, go to www.cdc.gov/coronavirus/2019-ncov/downloads/10Things.pdf.    Sign up for [employer]. We know it's scary to hear that you might have COVID-19. We want to track your symptoms to make sure you're okay over the next 2 weeks. Please look for an email from [employer]--this is a free, online program that we'll use to keep in touch. To sign up, follow the link in the email you will receive. Learn more at [email or web address]    How can I take care of myself?    Get lots of rest. Drink extra fluids (unless a doctor has told you not to)    Take Tylenol (acetaminophen) for fever or pain. If you have liver or kidney problems, ask your family doctor if it's okay to take Tylenol.  Adults can take either:    650 mg (two 325 mg pills) every 4 to 6 hours, or     1,000 mg (two 500 mg pills) every  8 hours as needed.    Note: Don't take more than 3,000 mg in one day. Acetaminophen is found in many medicines (both prescribed and over-the-counter medicines). Read all labels to be sure you don't take too much.  For children, check the Tylenol bottle for the right dose. The dose is based on the child's age or weight.    If you have other health problems (like cancer, heart failure, an organ transplant or severe kidney disease): Call your specialty clinic if you don't feel better in the next 2 days.    Know when to call 911. Emergency warning signs include:  Trouble breathing or shortness of breath  Pain or pressure in the chest that doesn't go away  Feeling confused like you haven't felt before, or not being able to wake up  Bluish-colored lips or face    Where can I get more information?     [employer] [place] - About COVID-19: [email or web address]  CDC - What to Do If You're Sick: www.cdc.gov/coronavirus/2019-ncov/about/steps-when-sick.html

## 2020-11-20 ENCOUNTER — PATIENT OUTREACH (OUTPATIENT)
Dept: OBGYN | Facility: CLINIC | Age: 35
End: 2020-11-20

## 2020-11-20 DIAGNOSIS — N87.0 DYSPLASIA OF CERVIX, LOW GRADE (CIN 1): ICD-10-CM

## 2021-01-06 ENCOUNTER — PRE VISIT (OUTPATIENT)
Dept: ONCOLOGY | Facility: CLINIC | Age: 36
End: 2021-01-06

## 2021-01-06 ENCOUNTER — OFFICE VISIT (OUTPATIENT)
Dept: OBGYN | Facility: CLINIC | Age: 36
End: 2021-01-06
Payer: COMMERCIAL

## 2021-01-06 VITALS
BODY MASS INDEX: 22.5 KG/M2 | WEIGHT: 131.8 LBS | HEIGHT: 64 IN | SYSTOLIC BLOOD PRESSURE: 102 MMHG | DIASTOLIC BLOOD PRESSURE: 61 MMHG | OXYGEN SATURATION: 98 % | HEART RATE: 84 BPM

## 2021-01-06 DIAGNOSIS — E61.1 IRON DEFICIENCY: ICD-10-CM

## 2021-01-06 DIAGNOSIS — J45.20 MILD INTERMITTENT ASTHMA WITHOUT COMPLICATION: ICD-10-CM

## 2021-01-06 DIAGNOSIS — Z80.3 FAMILY HISTORY OF MALIGNANT NEOPLASM OF BREAST: ICD-10-CM

## 2021-01-06 DIAGNOSIS — Z30.011 ORAL CONTRACEPTIVE PRESCRIBED: ICD-10-CM

## 2021-01-06 DIAGNOSIS — R53.83 FATIGUE, UNSPECIFIED TYPE: ICD-10-CM

## 2021-01-06 DIAGNOSIS — N87.0 DYSPLASIA OF CERVIX, LOW GRADE (CIN 1): ICD-10-CM

## 2021-01-06 DIAGNOSIS — Z01.419 ENCOUNTER FOR GYNECOLOGICAL EXAMINATION WITHOUT ABNORMAL FINDING: Primary | ICD-10-CM

## 2021-01-06 LAB
BASOPHILS # BLD AUTO: 0 10E9/L (ref 0–0.2)
BASOPHILS NFR BLD AUTO: 0.5 %
CORTIS SERPL-MCNC: 16.9 UG/DL (ref 4–22)
DIFFERENTIAL METHOD BLD: NORMAL
EOSINOPHIL # BLD AUTO: 0.2 10E9/L (ref 0–0.7)
EOSINOPHIL NFR BLD AUTO: 2.7 %
ERYTHROCYTE [DISTWIDTH] IN BLOOD BY AUTOMATED COUNT: 12.5 % (ref 10–15)
HCT VFR BLD AUTO: 38.6 % (ref 35–47)
HGB BLD-MCNC: 13 G/DL (ref 11.7–15.7)
LYMPHOCYTES # BLD AUTO: 3.3 10E9/L (ref 0.8–5.3)
LYMPHOCYTES NFR BLD AUTO: 51.1 %
MCH RBC QN AUTO: 29 PG (ref 26.5–33)
MCHC RBC AUTO-ENTMCNC: 33.7 G/DL (ref 31.5–36.5)
MCV RBC AUTO: 86 FL (ref 78–100)
MONOCYTES # BLD AUTO: 0.7 10E9/L (ref 0–1.3)
MONOCYTES NFR BLD AUTO: 11.3 %
NEUTROPHILS # BLD AUTO: 2.2 10E9/L (ref 1.6–8.3)
NEUTROPHILS NFR BLD AUTO: 34.4 %
PLATELET # BLD AUTO: 281 10E9/L (ref 150–450)
PROGEST SERPL-MCNC: 0.3 NG/ML
RBC # BLD AUTO: 4.49 10E12/L (ref 3.8–5.2)
T3FREE SERPL-MCNC: 2.6 PG/ML (ref 2.3–4.2)
WBC # BLD AUTO: 6.4 10E9/L (ref 4–11)

## 2021-01-06 PROCEDURE — 86376 MICROSOMAL ANTIBODY EACH: CPT | Performed by: NURSE PRACTITIONER

## 2021-01-06 PROCEDURE — 87624 HPV HI-RISK TYP POOLED RSLT: CPT | Performed by: OBSTETRICS & GYNECOLOGY

## 2021-01-06 PROCEDURE — 36415 COLL VENOUS BLD VENIPUNCTURE: CPT | Performed by: NURSE PRACTITIONER

## 2021-01-06 PROCEDURE — 99395 PREV VISIT EST AGE 18-39: CPT | Performed by: OBSTETRICS & GYNECOLOGY

## 2021-01-06 PROCEDURE — 83550 IRON BINDING TEST: CPT | Performed by: NURSE PRACTITIONER

## 2021-01-06 PROCEDURE — 84143 ASSAY OF 17-HYDROXYPREGNENO: CPT | Mod: 90 | Performed by: NURSE PRACTITIONER

## 2021-01-06 PROCEDURE — 82533 TOTAL CORTISOL: CPT | Performed by: OBSTETRICS & GYNECOLOGY

## 2021-01-06 PROCEDURE — 82728 ASSAY OF FERRITIN: CPT | Performed by: NURSE PRACTITIONER

## 2021-01-06 PROCEDURE — 99000 SPECIMEN HANDLING OFFICE-LAB: CPT | Performed by: NURSE PRACTITIONER

## 2021-01-06 PROCEDURE — 83540 ASSAY OF IRON: CPT | Performed by: NURSE PRACTITIONER

## 2021-01-06 PROCEDURE — 84481 FREE ASSAY (FT-3): CPT | Performed by: NURSE PRACTITIONER

## 2021-01-06 PROCEDURE — 86038 ANTINUCLEAR ANTIBODIES: CPT | Performed by: NURSE PRACTITIONER

## 2021-01-06 PROCEDURE — 84439 ASSAY OF FREE THYROXINE: CPT | Performed by: NURSE PRACTITIONER

## 2021-01-06 PROCEDURE — 84144 ASSAY OF PROGESTERONE: CPT | Performed by: NURSE PRACTITIONER

## 2021-01-06 PROCEDURE — 86039 ANTINUCLEAR ANTIBODIES (ANA): CPT | Performed by: OBSTETRICS & GYNECOLOGY

## 2021-01-06 PROCEDURE — 82627 DEHYDROEPIANDROSTERONE: CPT | Performed by: NURSE PRACTITIONER

## 2021-01-06 PROCEDURE — 85025 COMPLETE CBC W/AUTO DIFF WBC: CPT | Performed by: NURSE PRACTITIONER

## 2021-01-06 RX ORDER — BUDESONIDE 0.5 MG/2ML
0.5 INHALANT ORAL DAILY PRN
Qty: 1 BOX | Refills: 11 | Status: SHIPPED | OUTPATIENT
Start: 2021-01-06

## 2021-01-06 RX ORDER — ALBUTEROL SULFATE 90 UG/1
2-4 AEROSOL, METERED RESPIRATORY (INHALATION) EVERY 4 HOURS PRN
Qty: 1 INHALER | Refills: 11 | Status: SHIPPED | OUTPATIENT
Start: 2021-01-06 | End: 2021-08-31

## 2021-01-06 RX ORDER — DROSPIRENONE AND ETHINYL ESTRADIOL TABLETS 0.02-3(28)
1 KIT ORAL DAILY
Qty: 112 TABLET | Refills: 4 | Status: SHIPPED | OUTPATIENT
Start: 2021-01-06

## 2021-01-06 ASSESSMENT — MIFFLIN-ST. JEOR: SCORE: 1277.84

## 2021-01-06 NOTE — TELEPHONE ENCOUNTER
ONCOLOGY INTAKE: Records Information      APPT INFORMATION:  Referring provider:  Dr. Bae  Referring provider s clinic:  [facility]  Reason for visit/diagnosis:  family history of breast cancer  Has patient been notified of appointment date and time?: yes    RECORDS INFORMATION:  Were the records received with the referral (via [employer])? no    Has patient been seen for any external appt for this diagnosis? no    If yes, where? n/a    Has patient had any imaging or procedures outside of [facility] for this condition? no      If Yes, where? n/a    ADDITIONAL INFORMATION:  none

## 2021-01-06 NOTE — PROGRESS NOTES
Cuca is a 35 year old  female who presents for annual exam.     Menses are rare. Using oral contraceptives for contraception.  She is not currently considering pregnancy.  Besides routine health maintenance, she has no other health concerns today . Has been super fatigued and actually called into work the other day. Has been taking iron and still not feeling better. It's not emotional.  Due for repeat ferritin and CBC today.  Has been dx with chronic fatigue a long time ago.  Weight down 5 pounds since last years annual.  She is doing weight and strength training.  Wants to make sure nothing else could be causing her fatigue.  GYNECOLOGIC HISTORY:  Menarche:   Cuca is sexually active with 1male partner(s) and is currently in monogamous relationship.    History sexually transmitted infections:HPV  STI testing offered?  Declined  BELLA exposure: Unknown  History of abnormal Pap smear: YES - updated in Problem List and Health Maintenance accordingly  Family history of breast CA: Yes (Please explain): mom  Family history of uterine/ovarian CA: No    Family history of colon CA: No    HEALTH MAINTENANCE:  Cholesterol: (  Cholesterol   Date Value Ref Range Status   2020 241 (H) <200 mg/dL Final     Comment:     Desirable:       <200 mg/dl   2018 196 <200 mg/dL Final    History of abnormal lipids: Yes  Mammo: na . History of abnormal Mammo: Not applicable.  Regular Self Breast Exams: Yes  Calcium/Vitamin D intake: source:  dairy, dietary supplement(s) Adequate? Yes  TSH: (  TSH   Date Value Ref Range Status   2020 1.68 0.40 - 4.00 mU/L Final    )  Pap; (  Lab Results   Component Value Date    PAP NIL 2019    PAP ASC-US 2018    PAP ASC-US 2017    )    HISTORY:  OB History    Para Term  AB Living   0 0 0 0 0 0   SAB TAB Ectopic Multiple Live Births   0 0 0 0 0     Past Medical History:   Diagnosis Date     ASCUS with positive high risk HPV cervical 1/15/09,  08/25/2017, 11/07/18    type unknown; not 16/18. 11/07/18: See problem list.      Borderline high cholesterol      History of colposcopy 7/19/2006, 06/10/2008    2006 - MERRY 1, 2008 - ECC: neg     Mild intermittent asthma, uncomplicated     exercise or smoke brings it on     Papanicolaou smear of cervix with low grade squamous intraepithelial lesion (LGSIL) 7/12/2006, 05/13/2008     Past Surgical History:   Procedure Laterality Date     HC CREATE EARDRUM OPENING,GEN ANESTH  1991    P.E. Tubes     ORTHOPEDIC SURGERY       PE TUBES  age 2     REPLACE DISK CERVICAL ANTERIOR N/A 4/9/2019    Procedure: 1.  C5-C6 anterior cervical diskectomy and disk replacement using a Mobi-C device 5 x 15 mm.  2.  Fluoroscopy.  3.  Microscopy.  ;  Surgeon: Aleksandar Culp MD;  Location: [facility] OR     skin cancer Left 06/2018    maligant melenoma     TONSILLECTOMY  age 23     Family History   Problem Relation Age of Onset     Breast Cancer Mother 40        Mastectomy in Early 40s     Hypertension Mother      Cerebrovascular Disease Mother         TIA     Blood Disease Mother         high platelets      Cancer Mother      Thyroid Disease Mother      Hypertension Father      Lipids Father      Hypertension Paternal Grandfather      Hypertension Paternal Grandmother      Neurologic Disorder Paternal Grandmother         Parkinson's     Thyroid Disease Maternal Uncle         thyroid cancer     Neurologic Disorder Other         MS     Multiple Sclerosis Paternal Aunt      Rheumatoid Arthritis Cousin      Lupus Cousin      Hashimoto's thyroiditis Cousin      Graves' disease Paternal Aunt      Diabetes No family hx of      Glaucoma No family hx of      Macular Degeneration No family hx of      Social History     Socioeconomic History     Marital status: Single     Spouse name: Not on file     Number of children: 0     Years of education: Not on file     Highest education level: Not on file   Occupational History     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     Employer: [employer]   Social Needs     Financial resource strain: Not on file     Food insecurity     Worry: Not on file     Inability: Not on file     Transportation needs     Medical: Not on file     Non-medical: Not on file   Tobacco Use     Smoking status: Never Smoker     Smokeless tobacco: Never Used   Substance and Sexual Activity     Alcohol use: Yes     Alcohol/week: 0.0 standard drinks     Frequency: Monthly or less     Drinks per session: 1 or 2     Binge frequency: Never     Comment: once per month     Drug use: No     Sexual activity: Yes     Partners: Male     Birth control/protection: Pill   Lifestyle     Physical activity     Days per week: Not on file     Minutes per session: Not on file     Stress: Not on file   Relationships     Social connections     Talks on phone: Not on file     Gets together: Not on file     Attends Jewish service: Not on file     Active member of club or organization: Not on file     Attends meetings of clubs or organizations: Not on file     Relationship status: Not on file     Intimate partner violence     Fear of current or ex partner: Not on file     Emotionally abused: Not on file     Physically abused: Not on file     Forced sexual activity: Not on file   Other Topics Concern     Parent/sibling w/ CABG, MI or angioplasty before 65F 55M? Not Asked   Social History Narrative    ** Merged History Encounter **            Current Outpatient Medications:      albuterol (PROAIR HFA/PROVENTIL HFA/VENTOLIN HFA) 108 (90 Base) MCG/ACT inhaler, Inhale 2-4 puffs into the lungs every 4 hours as needed for shortness of breath / dyspnea or wheezing, Disp: 1 Inhaler, Rfl: 11     budesonide (PULMICORT) 0.5 MG/2ML neb solution, Take 2 mLs (0.5 mg) by nebulization daily as needed (short of breath), Disp: 1 Box, Rfl: 11     LORYNA 3-0.02 MG tablet, Take 1 tablet by mouth daily, Disp: 112 tablet, Rfl: 4     albuterol (PROVENTIL) (2.5 MG/3ML) 0.083% neb  "solution, Take 1 vial (2.5 mg) by nebulization every 6 hours as needed for shortness of breath / dyspnea or wheezing, Disp: 50 vial, Rfl: 5     Biotin 2500 MCG CAPS, Take 2,500 mcg by mouth daily , Disp: , Rfl:      Calcium Citrate-Vitamin D (CALCIUM CITRATE+D3 PETITES PO), Take 1 tablet by mouth daily, Disp: , Rfl:      cetirizine (ZYRTEC) 10 MG tablet, Take 1 tablet (10 mg) by mouth daily, Disp: 30 tablet, Rfl: 0     ferrous sulfate (FEROSUL) 325 (65 Fe) MG tablet, Take 1 tablet (325 mg) by mouth daily (with breakfast), Disp: 90 tablet, Rfl: 1     MELATONIN PO, Take 10 mg by mouth nightly as needed , Disp: , Rfl:      Multiple Vitamins-Minerals (HM MULTIVITAMIN ADULT GUMMY) CHEW, Take 1 tablet by mouth daily, Disp: , Rfl:      Omega-3 Fatty Acids (FISH OIL OMEGA-3 PO), Take 2 capsules by mouth daily, Disp: , Rfl:      vitamin B-Complex, Take 1 tablet by mouth daily, Disp: , Rfl:      Allergies   Allergen Reactions     Seasonal Allergies      Adhesive Tape Rash     Nickel Rash     Percocet [Oxycodone-Acetaminophen] Rash     Staples Rash     Metals-rash       Past medical, surgical, social and family history were reviewed and updated in EPIC.      EXAM:  /61   Pulse 84   Ht 1.626 m (5' 4\")   Wt 59.8 kg (131 lb 12.8 oz)   SpO2 98%   BMI 22.62 kg/m     BMI: Body mass index is 22.62 kg/m .  Constitutional: healthy, alert and no distress  Head: Normocephalic. No masses, lesions, tenderness or abnormalities  Neck: Neck supple. Trachea midline. No adenopathy. Thyroid symmetric, normal size.   Cardiovascular: RRR.   Respiratory: Negative.   Breast: Breasts reveal mild symmetric fibrocystic densities, but there are no dominant, discrete, fixed or suspicious masses found.  Gastrointestinal: Abdomen soft, non-tender, non-distended. No masses, organomegaly.  :  Vulva:  No external lesions, normal female hair distribution, no inguinal adenopathy.    Urethra:  Midline, non-tender, well supported, no " discharge  Vagina:  Moist, pink, no abnormal discharge, no lesions  Uterus:  Normal size , non-tender, freely mobile  Ovaries:  No masses appreciated, non-tender, mobile  Rectal Exam: deferred  Musculoskeletal: extremities normal  Skin: no suspicious lesions or rashes  Psychiatric: Affect appropriate, cooperative,mentation appears normal.     COUNSELING:   Reviewed preventive health counseling, as reflected in patient instructions   reports that she has never smoked. She has never used smokeless tobacco.    Body mass index is 22.62 kg/m .    FRAX Risk Assessment    ASSESSMENT:  35 year old female with satisfactory annual exam  (Z01.419) Encounter for gynecological examination without abnormal finding  (primary encounter diagnosis)  Comment: OCP  Plan:     (E61.1) Iron deficiency  Comment: on Fe  Plan: Iron and iron binding capacity        Recheck labs today    (R53.83) Fatigue, unspecified type  Comment: History of chronic fatigue  Plan: T3, Free, Progesterone, DHEA sulfate, Anti         Nuclear Dyana IgG by IFA with Reflex, T4, free,         Thyroid peroxidase antibody, Cortisol, 17 OH         pregnenolone        Check other labs today to ensure no other causes are found.    (N87.0) Dysplasia of cervix, low grade (MERRY 1)  Comment: Last Pap Nil with other high risk HPV  Plan: HPV High Risk Types DNA Cervical, Pap imaged         thin layer diagnostic with HPV (select HPV         order below)        Discussed if this Pap smear comes back with other high risk HPV she would need a colposcopy since last done in 2017.    (Z80.3) Family history of malignant neoplasm of breast  Comment: Mother diagnosed age 40  Plan: CANCER RISK MGMT/CANCER GENETIC COUNSELING         REFERRAL        Due for repeat mammogram in February.  Referral done so she can get genetic counseling and possible testing done for BRCA    (J45.20) Mild intermittent asthma without complication  Comment: Stable  Plan: albuterol (PROAIR HFA/PROVENTIL  HFA/VENTOLIN         HFA) 108 (90 Base) MCG/ACT inhaler, budesonide         (PULMICORT) 0.5 MG/2ML neb solution        Refills were done    (Z30.011) Oral contraceptive prescribed  Comment: Working well  Plan: LORYNA 3-0.02 MG tablet        Refill was done, dylan Bae MD

## 2021-01-07 LAB
ANA PAT SER IF-IMP: ABNORMAL
ANA SER QL IF: ABNORMAL
ANA TITR SER IF: ABNORMAL {TITER}
DHEA-S SERPL-MCNC: 62 UG/DL (ref 35–430)
FERRITIN SERPL-MCNC: 32 NG/ML (ref 12–150)
IRON SATN MFR SERPL: 33 % (ref 15–46)
IRON SERPL-MCNC: 134 UG/DL (ref 35–180)
T4 FREE SERPL-MCNC: 1.03 NG/DL (ref 0.76–1.46)
THYROPEROXIDASE AB SERPL-ACNC: <10 IU/ML
TIBC SERPL-MCNC: 402 UG/DL (ref 240–430)

## 2021-01-08 LAB
COPATH REPORT: NORMAL
PAP: NORMAL

## 2021-01-10 LAB — 17OH-PREG SERPL-MCNC: 47 NG/DL

## 2021-01-11 ENCOUNTER — PATIENT OUTREACH (OUTPATIENT)
Dept: OBGYN | Facility: CLINIC | Age: 36
End: 2021-01-11

## 2021-01-11 PROBLEM — N87.0 DYSPLASIA OF CERVIX, LOW GRADE (CIN 1): Status: ACTIVE | Noted: 2017-08-25

## 2021-01-19 ENCOUNTER — VIRTUAL VISIT (OUTPATIENT)
Dept: ONCOLOGY | Facility: CLINIC | Age: 36
End: 2021-01-19
Attending: OBSTETRICS & GYNECOLOGY
Payer: COMMERCIAL

## 2021-01-19 DIAGNOSIS — C43.9 SKIN CANCER (MELANOMA) (H): Primary | ICD-10-CM

## 2021-01-19 DIAGNOSIS — Z80.8 FAMILY HISTORY OF THYROID CANCER: ICD-10-CM

## 2021-01-19 DIAGNOSIS — Z80.3 FAMILY HISTORY OF MALIGNANT NEOPLASM OF BREAST: ICD-10-CM

## 2021-01-19 PROCEDURE — 96040 HC GENETIC COUNSELING, EACH 30 MINUTES: CPT | Mod: GT | Performed by: GENETIC COUNSELOR, MS

## 2021-01-19 NOTE — PROGRESS NOTES
1/19/2021    Referring Provider: Karla Bae MD    Presenting Information:   I met with Cuca Jung today for genetic counseling as part of the Cancer Risk Management Program (virtual visit) to discuss her personal history of melanoma and family history of breast cancer.  She is here today to review this history, cancer screening recommendations, and available genetic testing options.    Personal History:  Cuca is a 35 year old female. She was diagnosed with cutaneous melanoma (left knee) in 2018 and is followed by dermatology every 3-6 months.  Her most recent mammogram from 2/28/2020 was negative, and she plans to follow up annually.  She had her first menstrual period at age 13, and her ovaries, fallopian tubes and uterus are intact.      Family History: (Please see scanned pedigree for detailed family history information)    Cuca's mother was diagnosed with breast cancer at age 40.  She has not completed genetic testing     One maternal uncle was diagnosed with thyroid cancer in his teens requiring surgery.     No know paternal family history of cancer was reported, however limited details are available    Her ethnicity is Portuguese/Frisian. There is no known Ashkenazi Worship ancestry on either side of her family.    Discussion:    Cuca's personal history of melanoma and family history of early onset breast cancer may be suggestive of a hereditary cancer syndrome.    We reviewed the features of sporadic, familial, and hereditary cancers. Based on her family history, Cuca meets current National Comprehensive Cancer Network (NCCN) criteria for genetic testing of high-penetrance breast cancer susceptibility genes (including BRCA1/2, among others).      We discussed the natural history and genetics of Hereditary Breast and Ovarian Cancer syndrome, caused by mutations in the BRCA1/2 genes.We discussed that there are additional genes that could cause increased risk for breast cancer. As many  of these genes present with overlapping features in a family and accurate cancer risk cannot always be established based upon the pedigree analysis alone, it would be reasonable for Cuca to consider panel genetic testing to analyze multiple genes at once.     A detailed handout regarding hereditary breast cancer and the information we discussed can be found in the after visit summary. Topics included: inheritance pattern, cancer risks, cancer screening recommendations, and also risks, benefits and limitations of testing.    We discussed that genetic testing for breast cancer susceptibility genes is typically most informative, though, when it is first performed on a family member with a personal history of  breast cancer. In this situation, we discussed that Cuca's mother would be the best person to test first. As her mother has not completed genetic testing, Cuca expressed interest in proceeding with testing herself today.     We reviewed guidelines-based and expanded panel options for genetic testing.  Cuca expressed interest in learning as much information as possible from testing, especially in light of there being limited information regarding her paternal family health history.  Therefore, we discussed the 77 gene expanded cancer panel through [facility].  Genetic testing is available for 77 genes associated with increased risk for many different cancers: CancerNext-Expanded (AIP, ALK, APC, FAHEEM, AXIN2, BAP1, BARD1, BLM, BMPR1A, BRCA1, BRCA2, BRIP1, CDC73, CDH1, CDK4, CDKN1B, CDKN2A, CHEK2, CTNNA1, DICER1, EPCAM, EGFR, EGLN1, FANCC, FH, FLCN, GALNT12, GREM1, HOXB13, KIF1B, KIT, LZTR1, MAX, MEN1, MET, MITF, MLH1, MRE11, MSH2, MSH3, MSH6, MUTYH, NBN, NF1, NF2, NTHL1, PALB2, PDGFRA, PHOX2B, PMS2, POLD1, POLE, POT1, RQVCX6R, PTCH1, PTEN, RAD50, RAD51C, RAD51D, RB1, RECQL, RET, SDHA, SDHAF2, SDHB, SDHC, SDHD, SMAD4, SMARCA4, SMARCB1, SMARCE1, STK11, SUFU, WUFU692, TP53, TSC1, TSC2, VHL, and  XRCC2).  We discussed that many of the genes in the CancerNext-Expanded panel are associated with specific hereditary cancer syndromes and have published management guidelines:  Hereditary Breast and Ovarian Cancer syndrome (BRCA1, BRCA2), Ryan syndrome (MLH1, MSH2, MSH6, PMS2, EPCAM), Familial Adenomatous Polyposis (APC), Hereditary Diffuse Gastric Cancer (CDH1), Familial Atypical Multiple Mole Melanoma syndrome (CDK4, CDKN2A), Juvenile Polyposis syndrome (BMPR1A, SMAD4), Cowden syndrome (PTEN), Li Fraumeni syndrome (TP53), Peutz-Jeghers syndrome (STK11), MUTYH Associated Polyposis (MUTYH), Hereditary Leiomyomatosis and Renal Cell Cancer (FH), Kjcu-Fvva-Gvqb (FLCN), Hereditary Papillary Renal Carcinoma (MET), Hereditary Paraganglioma and Pheochromocytoma syndrome (SDHA, SDHAF2, SDHB, SDHC, SDHD), Multiple Endocrine Neoplasia type 2 (RET), Tuberous sclerosis complex (TSC1, TSC2), Von Hippel-Lindau disease (VHL), Neurofibromatosis type 1 (NF1), Neurofibromatosis type 2 (NF2), Multiple Endocrine Neoplasia type 1 (MEN1), Multiple Endocrine Neoplasia type 4 (CDKN1B), Gorlin syndrome (SUFU, PTCH1), and Sequeira complex ([id number]).  The FAHEEM, AXIN2, BARD1, BRIP1, CHEK2, GREM1, MSH3, NBN, NTHL1, PALB2, POLD1, POLE, RAD51C, and RAD51D genes are associated with increased cancer risk and have published management guidelines for certain cancers.    The remaining genes (AIP, ALK, BAP1, BLM, CDC73, CTNNA1, DICER1, EGFR, EGLN1, FANCC, GALNT12, HOXB13, KIF1B, KIT, LZTR1, MRE11, MAX, MITF, PDGFRA, PHOX2B, POT1, RAD50, RECQL, RB1, SMARCA4, SMARCB1, SMARCE1, [id number], and XRCC2) are associated with increased cancer risk and may allow us to make medical recommendations when mutations are identified.    Verbal consent was obtained and genetic testing for CancerNext-Expanded was sent to [facility]. A saliva kit will be provided by the laboratory, and sent directly to Cuca. We reviewed billing/insurance options, and  Cuca verbalized understanding.  Turn around time: 4-5 weeks.     Medical Management: For Cuca, we reviewed that the information from genetic testing may determine:    additional cancer screening for which Cuca may qualify (i.e. mammogram and breast MRI, more frequent colonoscopies, more frequent dermatologic exams, etc.),    options for risk reducing surgeries Cuca could consider (i.e. bilateral mastectomy, surgery to remove her ovaries and/or uterus, etc.),      and targeted chemotherapies for certain cancers in the future (i.e. immunotherapy for individuals with Ryan syndrome, PARP inhibitors, etc.), if they are needed.     These recommendations will be discussed in detail once genetic testing is completed.     Plan:  1) Today Cuca elected to proceed with the 77 gene cancer next expanded panel through [facility].  2) This information should be available in approximately 4 weeks.  3) Cuca will return to clinic to discuss the results.    Time spent (video visit): 30 minutes    Odalis Leonard MS, [facility] [place]  Licensed, Certified Genetic Counselor  [facility]  Phone: [phone]

## 2021-01-19 NOTE — LETTER
1/19/2021         RE: Cuca Jung  [address]  [place] [address]        Dear Colleague,    Thank you for referring your patient, Cuca Jung, to the [facility]. Please see a copy of my visit note below.    1/19/2021    Referring Provider: Karla Bae MD    Presenting Information:   I met with Cuca Jung today for genetic counseling as part of the Cancer Risk Management Program (virtual visit) to discuss her personal history of melanoma and family history of breast cancer.  She is here today to review this history, cancer screening recommendations, and available genetic testing options.    Personal History:  Cuca is a 35 year old female. She was diagnosed with cutaneous melanoma (left knee) in 2018 and is followed by dermatology every 3-6 months.  Her most recent mammogram from 2/28/2020 was negative, and she plans to follow up annually.  She had her first menstrual period at age 13, and her ovaries, fallopian tubes and uterus are intact.      Family History: (Please see scanned pedigree for detailed family history information)    Cuca's mother was diagnosed with breast cancer at age 40.  She has not completed genetic testing     One maternal uncle was diagnosed with thyroid cancer in his teens requiring surgery.     No know paternal family history of cancer was reported, however limited details are available    Her ethnicity is [place]ym[place]ut[place]. There is no known Ashkenazi Christian ancestry on either side of her family.    Discussion:    Cuca's personal history of melanoma and family history of early onset breast cancer may be suggestive of a hereditary cancer syndrome.    We reviewed the features of sporadic, familial, and hereditary cancers. Based on her family history, Cuca meets current National Comprehensive Cancer Network (NCCN) criteria for genetic testing of high-penetrance breast cancer susceptibility genes (including  BRCA1/2, among others).      We discussed the natural history and genetics of Hereditary Breast and Ovarian Cancer syndrome, caused by mutations in the BRCA1/2 genes.We discussed that there are additional genes that could cause increased risk for breast cancer. As many of these genes present with overlapping features in a family and accurate cancer risk cannot always be established based upon the pedigree analysis alone, it would be reasonable for Cuca to consider panel genetic testing to analyze multiple genes at once.     A detailed handout regarding hereditary breast cancer and the information we discussed can be found in the after visit summary. Topics included: inheritance pattern, cancer risks, cancer screening recommendations, and also risks, benefits and limitations of testing.    We discussed that genetic testing for breast cancer susceptibility genes is typically most informative, though, when it is first performed on a family member with a personal history of  breast cancer. In this situation, we discussed that Cuca's mother would be the best person to test first. As her mother has not completed genetic testing, Cuca expressed interest in proceeding with testing herself today.     We reviewed guidelines-based and expanded panel options for genetic testing.  Cuca expressed interest in learning as much information as possible from testing, especially in light of there being limited information regarding her paternal family health history.  Therefore, we discussed the 77 gene expanded cancer panel through [facility].  Genetic testing is available for 77 genes associated with increased risk for many different cancers: CancerNext-Expanded (AIP, ALK, APC, FAHEEM, AXIN2, BAP1, BARD1, BLM, BMPR1A, BRCA1, BRCA2, BRIP1, CDC73, CDH1, CDK4, CDKN1B, CDKN2A, CHEK2, CTNNA1, DICER1, EPCAM, EGFR, EGLN1, FANCC, FH, FLCN, GALNT12, GREM1, HOXB13, KIF1B, KIT, LZTR1, MAX, MEN1, MET, MITF, MLH1, MRE11, MSH2,  MSH3, MSH6, MUTYH, NBN, NF1, NF2, NTHL1, PALB2, PDGFRA, PHOX2B, PMS2, POLD1, POLE, POT1, AEWGT9H, PTCH1, PTEN, RAD50, RAD51C, RAD51D, RB1, RECQL, RET, SDHA, SDHAF2, SDHB, SDHC, SDHD, SMAD4, SMARCA4, SMARCB1, SMARCE1, STK11, SUFU, PPIB928, TP53, TSC1, TSC2, VHL, and XRCC2).  We discussed that many of the genes in the CancerNext-Expanded panel are associated with specific hereditary cancer syndromes and have published management guidelines:  Hereditary Breast and Ovarian Cancer syndrome (BRCA1, BRCA2), Ryan syndrome (MLH1, MSH2, MSH6, PMS2, EPCAM), Familial Adenomatous Polyposis (APC), Hereditary Diffuse Gastric Cancer (CDH1), Familial Atypical Multiple Mole Melanoma syndrome (CDK4, CDKN2A), Juvenile Polyposis syndrome (BMPR1A, SMAD4), Cowden syndrome (PTEN), Li Fraumeni syndrome (TP53), Peutz-Jeghers syndrome (STK11), MUTYH Associated Polyposis (MUTYH), Hereditary Leiomyomatosis and Renal Cell Cancer (FH), Lnjp-Rpel-Iamd (FLCN), Hereditary Papillary Renal Carcinoma (MET), Hereditary Paraganglioma and Pheochromocytoma syndrome (SDHA, SDHAF2, SDHB, SDHC, SDHD), Multiple Endocrine Neoplasia type 2 (RET), Tuberous sclerosis complex (TSC1, TSC2), Von Hippel-Lindau disease (VHL), Neurofibromatosis type 1 (NF1), Neurofibromatosis type 2 (NF2), Multiple Endocrine Neoplasia type 1 (MEN1), Multiple Endocrine Neoplasia type 4 (CDKN1B), Gorlin syndrome (SUFU, PTCH1), and Sequeira complex (WYEDD6L).  The FAHEEM, AXIN2, BARD1, BRIP1, CHEK2, GREM1, MSH3, NBN, NTHL1, PALB2, POLD1, POLE, RAD51C, and RAD51D genes are associated with increased cancer risk and have published management guidelines for certain cancers.    The remaining genes (AIP, ALK, BAP1, BLM, CDC73, CTNNA1, DICER1, EGFR, EGLN1, FANCC, GALNT12, HOXB13, KIF1B, KIT, LZTR1, MRE11, MAX, MITF, PDGFRA, PHOX2B, POT1, RAD50, RECQL, RB1, SMARCA4, SMARCB1, SMARCE1, [id number], and XRCC2) are associated with increased cancer risk and may allow us to make medical recommendations when  mutations are identified.    Verbal consent was obtained and genetic testing for CancerNext-Expanded was sent to [facility]. A saliva kit will be provided by the laboratory, and sent directly to Cuca. We reviewed billing/insurance options, and Cuca verbalized understanding.  Turn around time: 4-5 weeks.     Medical Management: For Cuca, we reviewed that the information from genetic testing may determine:    additional cancer screening for which Cuca may qualify (i.e. mammogram and breast MRI, more frequent colonoscopies, more frequent dermatologic exams, etc.),    options for risk reducing surgeries Cuca could consider (i.e. bilateral mastectomy, surgery to remove her ovaries and/or uterus, etc.),      and targeted chemotherapies for certain cancers in the future (i.e. immunotherapy for individuals with Ryan syndrome, PARP inhibitors, etc.), if they are needed.     These recommendations will be discussed in detail once genetic testing is completed.     Plan:  1) Today Cuca elected to proceed with the 77 gene cancer next expanded panel through [facility].  2) This information should be available in approximately 4 weeks.  3) Cuca will return to clinic to discuss the results.    Time spent (video visit): 30 minutes    Odalis Leonard MS, [facility]  Licensed, Certified Genetic Counselor  [facility]  Phone: [phone]

## 2021-01-26 DIAGNOSIS — C43.9 SKIN CANCER (MELANOMA) (H): ICD-10-CM

## 2021-01-26 DIAGNOSIS — Z80.3 FAMILY HISTORY OF MALIGNANT NEOPLASM OF BREAST: ICD-10-CM

## 2021-01-26 DIAGNOSIS — Z80.8 FAMILY HISTORY OF THYROID CANCER: ICD-10-CM

## 2021-02-17 LAB
LAB SCANNED RESULT: NORMAL
MISCELLANEOUS TEST: NORMAL

## 2021-02-24 ENCOUNTER — VIRTUAL VISIT (OUTPATIENT)
Dept: ONCOLOGY | Facility: CLINIC | Age: 36
End: 2021-02-24
Attending: GENETIC COUNSELOR, MS
Payer: COMMERCIAL

## 2021-02-24 DIAGNOSIS — Z80.8 FAMILY HISTORY OF THYROID CANCER: ICD-10-CM

## 2021-02-24 DIAGNOSIS — C43.9 SKIN CANCER (MELANOMA) (H): Primary | ICD-10-CM

## 2021-02-24 DIAGNOSIS — Z80.3 FAMILY HISTORY OF MALIGNANT NEOPLASM OF BREAST: ICD-10-CM

## 2021-02-24 PROCEDURE — 999N000069 HC STATISTIC GENETIC COUNSELING, < 16 MIN: Mod: GT | Performed by: GENETIC COUNSELOR, MS

## 2021-02-24 NOTE — PROGRESS NOTES
"2/24/2021    Referring Provider: Karla Bae MD    Presenting Information:  Cuca returned to the Cancer Risk Management Program(virtual visit) to discuss her genetic testing results.  Genetic test was ordered due to Cuca's personal history of melanoma and family history of breast cancer.  These results were reviewed today.       Genetic Testing Result: Variant of Uncertain Significance (VUS)  Cuca was found to have a variant of uncertain significance (VUS) in the BRCA1 gene. This variant is called p.V660I. Given the uncertain significance of this result, medical management decisions should NOT be made based on this test result alone.    No known clinically actionable alterations were detected    No additional pathogenic mutations, variants of unknown significance, or gross deletions or duplications were detected. Genes Analyzed (77 total): AIP, ALK, APC, FAHEEM, AXIN2, BAP1, BARD1, BLM, BMPR1A, BRCA1, BRCA2, BRIP1, CDC73, CDH1, CDK4, CDKN1B, CDKN2A, CHEK2, CTNNA1, DICER1, FANCC, FH, FLCN, GALNT12, KIF1B, LZTR1, MAX, MEN1, MET, MLH1, MSH2, MSH3, MSH6, MUTYH, NBN, NF1, NF2, NTHL1, PALB2, PHOX2B, PMS2, POT1, VJEUS1P, PTCH1, PTEN, RAD51C, RAD51D, RB1, RECQL, RET, SDHA, SDHAF2, SDHB, SDHC, SDHD, SMAD4, SMARCA4, SMARCB1, SMARCE1, STK11, SUFU, HTYK291, TP53, TSC1, TSC2, VHL and XRCC2 (sequencing and deletion/duplication); EGFR, EGLN1, HOXB13, KIT, MITF, PDGFRA, POLD1 and POLE (sequencing only); EPCAM and GREM1 (deletion/duplication only).    A copy of the test report can be found in the Laboratory tab, dated 1/26/2021, and named \"SEND OUTS MISC TEST\". The report is scanned in as a linked document.    Interpretation:  We discussed several different interpretations of this inconclusive test result. It is not clear if this variant in the BRCA1 gene is associated with increased cancer risk.  1. This variant may be a benign change that does not increase cancer risk.  2. This variant may be a harmful mutation " that causes Hereditary Breast and Ovarian Cancer syndrome.    The laboratory is working to determine if this variant is harmful or benign, and they will contact me if it is reclassified. If this variant is determined to be a benign change, there may be a different gene or combination of genes and environment that are associated with the cancers in this family.    It is also important to consider that her mother may have had a mutation in one of the genes tested and she did not inherit it.      Inheritance:  We reviewed the autosomal dominant inheritance of this variant in the BRCA1 gene and are passed on in a 50/50 manner.  Because it is unclear what, if any, risk is associated with this variant, clinical genetic testing for this BRCA1 variant alone is not recommended for relatives.    Family Studies:  The laboratory may offer additional research based testing for specific relatives in order to help reclassify this variant.    Screening:  Based on this inconclusive test result, it is important for Cuca and her relatives to refer back to the family history for appropriate cancer screening.      Based on her personal and family history, Cuca has a 22.6% lifetime risk of developing breast cancer based on the IBISv8 model. As such, Cuca meets current National Comprehensive Cancer Network (NCCN) guidelines for high risk breast screening. This includes annual breast MRI in addition to annual mammogram, alternating every 6 months.  In addition, Cuca should be receiving clinical breast exams by her physician. We discussed that Cuca could participate in our Cancer Risk Management Program in which our clinical nurse specialist provides an individual screening plan and assists with medical management. A referral was made to see CODY Monroe, for this service.     According to the American Cancer Society, close relatives are encouraged to speak with their managing physicians about having regular  skin exams and safe skin practices (i.e. sunscreen, self skin exams, limited sun exposure, etc.).    Other population cancer screening options, such as those recommended by the American Cancer Society and the National Comprehensive Cancer Network (NCCN), are also appropriate for Cuca and her family. These screening recommendations may change if there are changes to Cuca's personal and/or family history of cancer. Final screening recommendations should be made by each individual's managing physician.    Summary:  We do not have an explanation for Cuca's personal or family history of cancer. While no genetic changes were identified, Cuca and her close relatives may still be at risk for certain cancers due to family history, environmental factors, or other genetic causes not identified by this test.  Because of that, it is important that she continue with cancer screening based on her personal and family history as discussed above.    Genetic testing is rapidly advancing, and new cancer susceptibility genes will most likely be identified in the future. Therefore, I encouraged Cuca to contact me annually or if there are changes in her personal or family history. This may change how we assess her cancer risk, screening, and the testing we would offer.    Plan:  1.  Genetic test results and screening recommendations were discussed today    2.  She plans to follow-up with high risk breast screening.  3.  She should contact me annually, or sooner if her family history changes.  4.  I will contact Cuca if the laboratory informs me that this VUS has been reclassified.  This may change screening and testing recommendations for Cuca and her relatives.    If Cuca has any further questions, I encouraged her to contact me at  [phone].    Time spent face to face: 10 minutes    Odalis Leonard MS, [facility]  Licensed, Certified Genetic Counselor  [facility]  Phone: [phone]

## 2021-02-24 NOTE — LETTER
"    2/24/2021         RE: Cuca Jung  [address] [place] [address]  [place] [address]        Dear Colleague,    Thank you for referring your patient, Cuca Jung, to the [facility]. Please see a copy of my visit note below.    2/24/2021    Referring Provider: Karla Bae MD    Presenting Information:  Cuca returned to the Cancer Risk Management Program(virtual visit) to discuss her genetic testing results.  Genetic test was ordered due to Cuca's personal history of melanoma and family history of breast cancer.  These results were reviewed today.       Genetic Testing Result: Variant of Uncertain Significance (VUS)  Cuca was found to have a variant of uncertain significance (VUS) in the BRCA1 gene. This variant is called p.V660I. Given the uncertain significance of this result, medical management decisions should NOT be made based on this test result alone.    No known clinically actionable alterations were detected    No additional pathogenic mutations, variants of unknown significance, or gross deletions or duplications were detected. Genes Analyzed (77 total): AIP, ALK, APC, FAHEEM, AXIN2, BAP1, BARD1, BLM, BMPR1A, BRCA1, BRCA2, BRIP1, CDC73, CDH1, CDK4, CDKN1B, CDKN2A, CHEK2, CTNNA1, DICER1, FANCC, FH, FLCN, GALNT12, KIF1B, LZTR1, MAX, MEN1, MET, MLH1, MSH2, MSH3, MSH6, MUTYH, NBN, NF1, NF2, NTHL1, PALB2, PHOX2B, PMS2, POT1, TFEXK4U, PTCH1, PTEN, RAD51C, RAD51D, RB1, RECQL, RET, SDHA, SDHAF2, SDHB, SDHC, SDHD, SMAD4, SMARCA4, SMARCB1, SMARCE1, STK11, SUFU, VDXU149, TP53, TSC1, TSC2, VHL and XRCC2 (sequencing and deletion/duplication); EGFR, EGLN1, HOXB13, KIT, MITF, PDGFRA, POLD1 and POLE (sequencing only); EPCAM and GREM1 (deletion/duplication only).    A copy of the test report can be found in the Laboratory tab, dated 1/26/2021, and named \"SEND OUTS MISC TEST\". The report is scanned in as a linked document.    Interpretation:  We discussed several " different interpretations of this inconclusive test result. It is not clear if this variant in the BRCA1 gene is associated with increased cancer risk.  1. This variant may be a benign change that does not increase cancer risk.  2. This variant may be a harmful mutation that causes Hereditary Breast and Ovarian Cancer syndrome.    The laboratory is working to determine if this variant is harmful or benign, and they will contact me if it is reclassified. If this variant is determined to be a benign change, there may be a different gene or combination of genes and environment that are associated with the cancers in this family.    It is also important to consider that her mother may have had a mutation in one of the genes tested and she did not inherit it.      Inheritance:  We reviewed the autosomal dominant inheritance of this variant in the BRCA1 gene and are passed on in a 50/50 manner.  Because it is unclear what, if any, risk is associated with this variant, clinical genetic testing for this BRCA1 variant alone is not recommended for relatives.    Family Studies:  The laboratory may offer additional research based testing for specific relatives in order to help reclassify this variant.    Screening:  Based on this inconclusive test result, it is important for Cuca and her relatives to refer back to the family history for appropriate cancer screening.      Based on her personal and family history, Cuca has a 22.6% lifetime risk of developing breast cancer based on the IBISv8 model. As such, Cuca meets current National Comprehensive Cancer Network (NCCN) guidelines for high risk breast screening. This includes annual breast MRI in addition to annual mammogram, alternating every 6 months.  In addition, Cuca should be receiving clinical breast exams by her physician. We discussed that Cuca could participate in our Cancer Risk Management Program in which our clinical nurse specialist provides an  individual screening plan and assists with medical management. A referral was made to see CODY Monroe, for this service.     According to the American Cancer Society, close relatives are encouraged to speak with their managing physicians about having regular skin exams and safe skin practices (i.e. sunscreen, self skin exams, limited sun exposure, etc.).    Other population cancer screening options, such as those recommended by the American Cancer Society and the National Comprehensive Cancer Network (NCCN), are also appropriate for Cuca and her family. These screening recommendations may change if there are changes to Cuca's personal and/or family history of cancer. Final screening recommendations should be made by each individual's managing physician.    Summary:  We do not have an explanation for Cuca's personal or family history of cancer. While no genetic changes were identified, Cuca and her close relatives may still be at risk for certain cancers due to family history, environmental factors, or other genetic causes not identified by this test.  Because of that, it is important that she continue with cancer screening based on her personal and family history as discussed above.    Genetic testing is rapidly advancing, and new cancer susceptibility genes will most likely be identified in the future. Therefore, I encouraged Cuca to contact me annually or if there are changes in her personal or family history. This may change how we assess her cancer risk, screening, and the testing we would offer.    Plan:  1.  Genetic test results and screening recommendations were discussed today    2.  She plans to follow-up with high risk breast screening.  3.  She should contact me annually, or sooner if her family history changes.  4.  I will contact Cuca if the laboratory informs me that this VUS has been reclassified.  This may change screening and testing recommendations for Cuca  and her relatives.    If Cuca has any further questions, I encouraged her to contact me at  [phone].    Time spent face to face: 10 minutes    Odalis Leonard MS, [facility]  Licensed, Certified Genetic Counselor  [facility]  Phone: [phone]

## 2021-02-24 NOTE — LETTER
Cancer Risk Management  Program [facility]  [facility]  [facility]  Mailing Address  Cancer Risk Management Program  [address]  [place] [address]    New patient appointments  [phone]  February 25, 2021    Cuca Jung  [address] [address]      Dear Cuca,    It was a pleasure speaking with you on 2/24/2021. Here is a copy of the progress note from our discussion. If you have any additional questions, please feel free to call.    Referring Provider: Karla Bae MD    Presenting Information:  Cuca returned to the Cancer Risk Management Program(virtual visit) to discuss her genetic testing results.  Genetic test was ordered due to Cuca's personal history of melanoma and family history of breast cancer.  These results were reviewed today.       Genetic Testing Result: Variant of Uncertain Significance (VUS)  Cuac was found to have a variant of uncertain significance (VUS) in the BRCA1 gene. This variant is called [id number].V660I. Given the uncertain significance of this result, medical management decisions should NOT be made based on this test result alone.    No known clinically actionable alterations were detected    No additional pathogenic mutations, variants of unknown significance, or gross deletions or duplications were detected. Genes Analyzed (77 total): AIP, ALK, APC, FAHEEM, AXIN2, BAP1, BARD1, BLM, BMPR1A, BRCA1, BRCA2, BRIP1, CDC73, CDH1, CDK4, CDKN1B, CDKN2A, CHEK2, CTNNA1, DICER1, FANCC, FH, FLCN, GALNT12, KIF1B, LZTR1, MAX, MEN1, MET, MLH1, MSH2, MSH3, MSH6, MUTYH, NBN, NF1, NF2, NTHL1, PALB2, PHOX2B, PMS2, POT1, TCNHO3O, PTCH1, PTEN, RAD51C, RAD51D, RB1, RECQL, RET, SDHA, SDHAF2, SDHB, SDHC, SDHD, SMAD4, SMARCA4, SMARCB1, SMARCE1, STK11, SUFU,  JPVI990, TP53, TSC1, TSC2, VHL and XRCC2 (sequencing and deletion/duplication); EGFR, EGLN1, HOXB13, KIT, MITF, PDGFRA, POLD1 and POLE (sequencing only); EPCAM and GREM1 (deletion/duplication only).    Interpretation:  We discussed several different interpretations of this inconclusive test result. It is not clear if this variant in the BRCA1 gene is associated with increased cancer risk.  1. This variant may be a benign change that does not increase cancer risk.  2. This variant may be a harmful mutation that causes Hereditary Breast and Ovarian Cancer syndrome.    The laboratory is working to determine if this variant is harmful or benign, and they will contact me if it is reclassified. If this variant is determined to be a benign change, there may be a different gene or combination of genes and environment that are associated with the cancers in this family.    It is also important to consider that her mother may have had a mutation in one of the genes tested and she did not inherit it.      Inheritance:  We reviewed the autosomal dominant inheritance of this variant in the BRCA1 gene and are passed on in a 50/50 manner.  Because it is unclear what, if any, risk is associated with this variant, clinical genetic testing for this BRCA1 variant alone is not recommended for relatives.    Family Studies:  The laboratory may offer additional research based testing for specific relatives in order to help reclassify this variant.    Screening:  Based on this inconclusive test result, it is important for Cuca and her relatives to refer back to the family history for appropriate cancer screening.      Based on her personal and family history, Cuca has a 22.6% lifetime risk of developing breast cancer based on the IBISv8 model. As such, Cuca meets current National Comprehensive Cancer Network (NCCN) guidelines for high risk breast screening. This includes annual breast MRI in addition to annual mammogram,  alternating every 6 months.  In addition, Cuca should be receiving clinical breast exams by her physician. We discussed that Cuca could participate in our Cancer Risk Management Program in which our clinical nurse specialist provides an individual screening plan and assists with medical management. A referral was made to see CODY Monroe, for this service.     According to the American Cancer Society, close relatives are encouraged to speak with their managing physicians about having regular skin exams and safe skin practices (i.e. sunscreen, self skin exams, limited sun exposure, etc.).    Other population cancer screening options, such as those recommended by the American Cancer Society and the National Comprehensive Cancer Network (NCCN), are also appropriate for Cuca and her family. These screening recommendations may change if there are changes to Cuca's personal and/or family history of cancer. Final screening recommendations should be made by each individual's managing physician.    Summary:  We do not have an explanation for Cuca's personal or family history of cancer. While no genetic changes were identified, Cuca and her close relatives may still be at risk for certain cancers due to family history, environmental factors, or other genetic causes not identified by this test.  Because of that, it is important that she continue with cancer screening based on her personal and family history as discussed above.    Genetic testing is rapidly advancing, and new cancer susceptibility genes will most likely be identified in the future. Therefore, I encouraged Cuca to contact me annually or if there are changes in her personal or family history. This may change how we assess her cancer risk, screening, and the testing we would offer.    Plan:  1.  Genetic test results and screening recommendations were discussed today    2.  She plans to follow-up with high risk breast  screening.  3.  She should contact me annually, or sooner if her family history changes.  4.  I will contact Cuca if the laboratory informs me that this VUS has been reclassified.  This may change screening and testing recommendations for Cuca and her relatives.    If Cuca has any further questions, I encouraged her to contact me at  [phone].    Odalis Leonard MS, [facility]  Licensed, Certified Genetic Counselor  [facility]  Phone: [phone]

## 2021-02-26 NOTE — PATIENT INSTRUCTIONS
Genetic Testing  You had a blood test that looked at the genetic information in one or more genes associated with increased cancer risk.  The testing looked for any harmful changes that would stop this particular gene from working like it should.  It is important to remember that everyone has variants in multiple genes in their bodies that do not affect how the gene works.  These changes are benign and do not cause disease or increase cancer risk.  The term often used to describe these variants is benign polymorphism.  If an individual is found to have a benign polymorphism, their genetic test result is reported as Negative.    Results  There are three possible results of any genetic test:    Positive--a harmful mutation was identified    Negative--no mutation was identified    Variant of unknown significance--a variation in one of the genes was identified, but it is unclear how this impacts cancer risk in the family    Variant of Unknown Significance (VUS)  A VUS was identified in your blood sample.  Scientists currently do not know if this variant is harmful or if it is a benign polymorphism not associated with any increased risk of cancer.   There are several reasons for this lack of knowledge, but likely your VUS has either never been seen before or has only been seen in a small number of individuals.  Until your VUS is studied in more detail and/or seen in more families, scientists are not able to predict if it is a harmful mutation or a benign polymorphism.  Therefore, the cause of the cancer in you and your relatives is still unknown.          Reclassification  Genetic testing laboratories and researchers are constantly working to determine the importance of variants of unknown significance.  Most laboratories will notify the genetic counselor when a patient s VUS has been reclassified as a harmful mutation or a benign polymorphism.      Some families may be candidates for participation in the  laboratory s variant research programs to help determine the importance of their VUS.  Only the testing laboratory can decide who is eligible for participation.  Participating in these programs does not guarantee that families will learn the significance of their VUS immediately.  It could be months or years before a VUS is reclassified.       Screening Recommendations  Cancer screening recommendations for patients with a VUS should be based on their personal and family history rather than the VUS itself.  This may include increased cancer screening for certain individuals in the family.  Your genetic counselor and health care provider can help make appropriate recommendations for you and your relatives.      It is usually not recommended that other relatives have genetic testing for the VUS unless it is done as part of the laboratory s variant research program because an individual s test results should not influence their cancer screening until we determine the importance of the VUS.  Your genetic counselor can help you and your relatives understand the risks and benefits of all genetic testing and cancer screening options.    Please call us if you have any questions or concerns.     Cancer Risk Management Program [phone] ([phone])  ? Dominick Lindsay, MS, [facility] [phone]  ? Jessica Galo, MS, [facility]  [phone]  ? Odalis Leonard, MS, [facility]  [phone]  ? Hari Aguayo, MS, [facility]  [phone]  ? Iliana Leavitt, MS, [facility] [phone]  ? Trista Auguste, MS, [facility] [phone]  ? Umu Faulkner, MS, [facility]  [phone]

## 2021-03-05 ENCOUNTER — PATIENT OUTREACH (OUTPATIENT)
Dept: OBGYN | Facility: CLINIC | Age: 36
End: 2021-03-05

## 2021-03-05 DIAGNOSIS — N87.0 DYSPLASIA OF CERVIX, LOW GRADE (CIN 1): ICD-10-CM

## 2021-03-05 NOTE — LETTER
March 5, 2021      Cuca Jung  [address] [place] [address]  [facility] [address]        Dear ,    At [facility], your health and wellness are our primary concern. That is why we are following up on your most recent positive high-risk Human Papillomavirus (HPV) test.    Please call [phone] to schedule an appointment for your recommended follow-up Colposcopy (this cannot be scheduled through [facility]) at your earliest convenience. {[place] Recommended:[id number]}    If you have completed the appointment outside of the [facility] system, please have the records forwarded to our office. We will update your chart for your provider to review before your next annual wellness visit.     Thank you for choosing [facility]!      Sincerely,    Your [facility] Care Team

## 2021-03-18 ENCOUNTER — OFFICE VISIT (OUTPATIENT)
Dept: URGENT CARE | Facility: URGENT CARE | Age: 36
End: 2021-03-18
Payer: COMMERCIAL

## 2021-03-18 VITALS
DIASTOLIC BLOOD PRESSURE: 75 MMHG | SYSTOLIC BLOOD PRESSURE: 114 MMHG | OXYGEN SATURATION: 99 % | BODY MASS INDEX: 22.49 KG/M2 | HEART RATE: 78 BPM | TEMPERATURE: 98.8 F | WEIGHT: 131 LBS

## 2021-03-18 DIAGNOSIS — B34.9 VIRAL SYNDROME: ICD-10-CM

## 2021-03-18 DIAGNOSIS — R50.9 FEVER IN ADULT: Primary | ICD-10-CM

## 2021-03-18 DIAGNOSIS — L73.8 HOT TUB FOLLICULITIS: ICD-10-CM

## 2021-03-18 PROCEDURE — U0003 INFECTIOUS AGENT DETECTION BY NUCLEIC ACID (DNA OR RNA); SEVERE ACUTE RESPIRATORY SYNDROME CORONAVIRUS 2 (SARS-COV-2) (CORONAVIRUS DISEASE [COVID-19]), AMPLIFIED PROBE TECHNIQUE, MAKING USE OF HIGH THROUGHPUT TECHNOLOGIES AS DESCRIBED BY CMS-2020-01-R: HCPCS | Performed by: FAMILY MEDICINE

## 2021-03-18 PROCEDURE — 99N1174 PR STATISTIC STREP A RAPID: Performed by: FAMILY MEDICINE

## 2021-03-18 PROCEDURE — 87651 STREP A DNA AMP PROBE: CPT | Performed by: FAMILY MEDICINE

## 2021-03-18 PROCEDURE — U0005 INFEC AGEN DETEC AMPLI PROBE: HCPCS | Performed by: FAMILY MEDICINE

## 2021-03-18 PROCEDURE — 99214 OFFICE O/P EST MOD 30 MIN: CPT | Performed by: FAMILY MEDICINE

## 2021-03-18 RX ORDER — CIPROFLOXACIN 500 MG/1
500 TABLET, FILM COATED ORAL 2 TIMES DAILY
Qty: 20 TABLET | Refills: 0 | Status: SHIPPED | OUTPATIENT
Start: 2021-03-18 | End: 2021-03-28

## 2021-03-18 NOTE — PROGRESS NOTES
ASSESSMENT/  PLAN:  Fever in adult     - Streptococcus A Rapid Scr w Reflx to PCR  - Symptomatic COVID-19 Virus (Coronavirus) by PCR  - Group A Streptococcus PCR Throat Swab    Hot tub folliculitis     - ciprofloxacin (CIPRO) 500 MG tablet; Take 1 tablet (500 mg) by mouth 2 times daily for 10 days    Discussed that pseudomonas is common pathogen in hot tubs-  Preferred treatment is cipro    Viral syndrome         discussed with the patient that a confirmatory strep culture will be performed and that she will be called if the culture is positive for strep.  We discussed other possible causes of pharyngitis including cold viruses and  covid 19    If covid test is positive will need to quarantine at least 10 days     Symptomatic treat with   OTC analgesic as needed. Follow-up with primary clinic if not improving.   --------------------------------------------------------------------------------------------------------------------     SUBJECTIVE:  Chief Complaint   Patient presents with     Fever     1.5 days with temp of -it broke this morning, body aches x 1.5 days, rash all over body for about 2.5 days-think it from the hot tub, swollen lymph node started yesteday afternoon, notice it yesterday that heart rates are higher with activity      Rash     Generalized Body Aches     Tachycardia     Cuca Jung is a 35 year old female   with a chief complaint of fever and malaise  Onset of symptoms was 2 day(s) ago.    Course of illness: sudden onset, still present and constant.  Severity moderate  Current and Associated symptoms: fever, chills, sore throat, headache, body aches and fatigue   Patient denies: cough - non-productive, wheezing, shortness of breath, ear pain bilateral, facial pain/pressure, nausea, vomiting and diarrhea  Treatment measures tried include None tried.  Also developed spotty rash  Predisposing factors include -  She thinks the rash may be from hot tub exposure  Has had past chicken  pox and past positive varicella antibody test.  No known covid exposure.    Past Medical History:   Diagnosis Date     ASCUS with positive high risk HPV cervical 1/15/09, 08/25/2017, 11/07/18    type unknown; not 16/18. 11/07/18: See problem list.      Borderline high cholesterol      Cervical high risk HPV (human papillomavirus) test positive 01/06/2021 01/6/21     History of colposcopy 7/19/2006, 06/10/2008    2006 - MERRY 1, 2008 - ECC: neg     Mild intermittent asthma, uncomplicated     exercise or smoke brings it on     Papanicolaou smear of cervix with low grade squamous intraepithelial lesion (LGSIL) 7/12/2006, 05/13/2008     Patient Active Problem List   Diagnosis     Encounter for surveillance of other contraceptive     Family history of malignant neoplasm of breast     CARDIOVASCULAR SCREENING; LDL GOAL LESS THAN 160     Mild intermittent asthma, uncomplicated     Dysplasia of cervix, low grade (MERRY 1)     Skin cancer (melanoma) (H)     S/P cervical disc replacement     Iron deficiency     Vegan diet         ALLERGIES:  Seasonal allergies, Adhesive tape, Nickel, Percocet [oxycodone-acetaminophen], and Yossi     MEDs    albuterol (PROAIR HFA/PROVENTIL HFA/VENTOLIN HFA) 108 (90 Base) MCG/ACT inhaler, Inhale 2-4 puffs into the lungs every 4 hours as needed for shortness of breath / dyspnea or wheezing  albuterol (PROVENTIL) (2.5 MG/3ML) 0.083% neb solution, Take 1 vial (2.5 mg) by nebulization every 6 hours as needed for shortness of breath / dyspnea or wheezing  Biotin 2500 MCG CAPS, Take 2,500 mcg by mouth daily   budesonide (PULMICORT) 0.5 MG/2ML neb solution, Take 2 mLs (0.5 mg) by nebulization daily as needed (short of breath)  Calcium Citrate-Vitamin D (CALCIUM CITRATE+D3 PETITES PO), Take 1 tablet by mouth daily  cetirizine (ZYRTEC) 10 MG tablet, Take 1 tablet (10 mg) by mouth daily  ferrous sulfate (FEROSUL) 325 (65 Fe) MG tablet, Take 1 tablet (325 mg) by mouth daily (with breakfast)  LORYNA  3-0.02 MG tablet, Take 1 tablet by mouth daily  MELATONIN PO, Take 10 mg by mouth nightly as needed   Multiple Vitamins-Minerals (HM MULTIVITAMIN ADULT GUMMY) CHEW, Take 1 tablet by mouth daily  Omega-3 Fatty Acids (FISH OIL OMEGA-3 PO), Take 2 capsules by mouth daily  vitamin B-Complex, Take 1 tablet by mouth daily    No current facility-administered medications on file prior to visit.       Social History     Tobacco Use     Smoking status: Never Smoker     Smokeless tobacco: Never Used   Substance Use Topics     Alcohol use: Yes     Alcohol/week: 0.0 standard drinks     Frequency: Monthly or less     Drinks per session: 1 or 2     Binge frequency: Never     Comment: once per month       Family History   Problem Relation Age of Onset     Breast Cancer Mother 40        Mastectomy in Early 40s     Hypertension Mother      Cerebrovascular Disease Mother         TIA     Blood Disease Mother         high platelets      Cancer Mother      Thyroid Disease Mother      Hypertension Father      Lipids Father      Hypertension Paternal Grandfather      Hypertension Paternal Grandmother      Neurologic Disorder Paternal Grandmother         Parkinson's     Thyroid Disease Maternal Uncle         thyroid cancer     Neurologic Disorder Other         MS     Multiple Sclerosis Paternal Aunt      Rheumatoid Arthritis Cousin      Lupus Cousin      Hashimoto's thyroiditis Cousin      Graves' disease Paternal Aunt      Diabetes No family hx of      Glaucoma No family hx of      Macular Degeneration No family hx of          ROS:  CONSTITUTIONAL:  fever, chills,    INTEGUMENTARY/SKIN:  sparse rash , No lesions  EYES: NEGATIVE for vision changes or irritation  ENT/MOUTH: NEGATIVE for ear, mouth  Problems, has sore throat  RESP:NEGATIVE for significant cough or SOB  GI: NEGATIVE for nausea, abdominal pain,  , or change in bowel habits       OBJECTIVE:   /75 (BP Location: Right arm, Patient Position: Sitting, Cuff Size: Adult Regular)    Pulse 78   Temp 98.8  F (37.1  C) (Oral)   Wt 59.4 kg (131 lb)   SpO2 99%   BMI 22.49 kg/m    GENERAL APPEARANCE: alert, moderate distress, cooperative and fatigued  EYES: EOMI,  PERRL, conjunctiva clear  HENT: ear canals and TM's normal.  Nose normal.  Pharynx erythematous with some exudate noted.  NECK: supple, non-tender to palpation, no adenopathy noted  RESP: lungs clear to auscultation - no rales, rhonchi or wheezes  CV: regular rates and rhythm, normal S1 S2, no murmur noted  ABDOMEN:  soft, nontender, no HSM or masses and bowel sounds normal  SKIN:  Sparse 3 mm papular/ vesicular lesions on trunk and extremitites     Results for orders placed or performed in visit on 03/18/21   Streptococcus A Rapid Scr w Reflx to PCR     Status: None    Specimen: Throat   Result Value Ref Range    Strep Specimen Description Throat     Streptococcus Group A Rapid Screen Negative NEG^Negative

## 2021-03-19 LAB
LABORATORY COMMENT REPORT: NORMAL
SARS-COV-2 RNA RESP QL NAA+PROBE: NEGATIVE
SARS-COV-2 RNA RESP QL NAA+PROBE: NORMAL
SPECIMEN SOURCE: NORMAL
SPECIMEN SOURCE: NORMAL

## 2021-04-06 ENCOUNTER — PATIENT OUTREACH (OUTPATIENT)
Dept: OBGYN | Facility: CLINIC | Age: 36
End: 2021-04-06

## 2021-04-06 NOTE — LETTER
June 22, 2021      Cuca Jung  [address] [place] [address]  [facility] [address]        Dear ,    At [facility], your health and wellness are our primary concern. That is why we are following up on your most recent positive high-risk Human Papillomavirus (HPV) test.    Please call [phone] and press 1 to schedule an appointment for your recommended follow-up Colposcopy (this cannot be scheduled through [facility]) at your earliest convenience. If you have chosen not to do the recommended colposcopy, please contact your clinic to schedule an appointment for a repeat Pap smear and Human Papillomavirus (HPV) test at this time.    If you have completed the appointment outside of the [facility] system, please have the records forwarded to our office. We will update your chart for your provider to review before your next annual wellness visit.     Thank you for choosing [facility]!      Sincerely,    Your [facility] Care Team

## 2021-07-19 NOTE — TELEPHONE ENCOUNTER
FYI to provider - Patient is lost to pap tracking follow-up. Attempts to contact pt have been made per reminder process and there has been no reply and/or no appt scheduled.       7/06 LSIL pap -- [place] Bx: MERRY 1  5/07 NIL pap  5/08 LSIL pap   6/08 [place] ECC: neg  1/09 ASCUS pap, + HR HPV (type unknown)   7/09, 9/11, 1/13, 7/14 - all NIL paps  8/25/17 ASCUS pap, + HR HPV (not 16/18). Plan colp due by 11/25/17.  10/13/17: [place] Bx atypical squamous epithelium of undetermined significance. Plan cotest in 1 year.  11/07/18: ASCUS Pap,+ HR HPV (not 16 or 18) result. Plan cotest in 1 year per provider.   12/2/19 NIL, +HR HPV, not 16/18. Plan [place]  05/06/20 3mo colp not done, chart and tracking updated for 6mo colp/pap.   08/07/20: Plan cotest due by 12/2/20. See mychart encounter from 08/6/20. Provider advised the pt.   11/20/20 My Chart Reminder Sent.  1/6/21 NIL Pap, + HR HPV (not 16 or 18) Plan [place] due bef 04/6/21.  01/12/21 Msg left and results and recommendations also released thru [facility]. Pt viewed.   03/5/21 Reminder Mychart, Letter  04/6/21 [place] not done. Tracking updated for 6 mo colp/pap due bef 07/6/21.   06/22/21 Reminder Mychart, Letter / mychart viewed  7/19/21 Lost to follow-up for pap tracking

## 2021-08-31 ENCOUNTER — OFFICE VISIT (OUTPATIENT)
Dept: FAMILY MEDICINE | Facility: CLINIC | Age: 36
End: 2021-08-31
Payer: COMMERCIAL

## 2021-08-31 VITALS
OXYGEN SATURATION: 99 % | HEIGHT: 65 IN | TEMPERATURE: 97.9 F | DIASTOLIC BLOOD PRESSURE: 73 MMHG | BODY MASS INDEX: 22.73 KG/M2 | WEIGHT: 136.4 LBS | SYSTOLIC BLOOD PRESSURE: 108 MMHG | HEART RATE: 69 BPM

## 2021-08-31 DIAGNOSIS — E61.1 IRON DEFICIENCY: ICD-10-CM

## 2021-08-31 DIAGNOSIS — Z11.1 SCREENING EXAMINATION FOR PULMONARY TUBERCULOSIS: ICD-10-CM

## 2021-08-31 DIAGNOSIS — Z00.00 ROUTINE GENERAL MEDICAL EXAMINATION AT A HEALTH CARE FACILITY: Primary | ICD-10-CM

## 2021-08-31 DIAGNOSIS — N87.0 DYSPLASIA OF CERVIX, LOW GRADE (CIN 1): ICD-10-CM

## 2021-08-31 DIAGNOSIS — J45.20 MILD INTERMITTENT ASTHMA WITHOUT COMPLICATION: ICD-10-CM

## 2021-08-31 LAB
ERYTHROCYTE [DISTWIDTH] IN BLOOD BY AUTOMATED COUNT: 11.7 % (ref 10–15)
FASTING STATUS PATIENT QL REPORTED: NO
FERRITIN SERPL-MCNC: 23 NG/ML (ref 12–150)
GLUCOSE BLD-MCNC: 93 MG/DL (ref 70–99)
HCT VFR BLD AUTO: 37.2 % (ref 35–47)
HGB BLD-MCNC: 12.6 G/DL (ref 11.7–15.7)
IRON SATN MFR SERPL: 42 % (ref 15–46)
IRON SERPL-MCNC: 157 UG/DL (ref 35–180)
MCH RBC QN AUTO: 29 PG (ref 26.5–33)
MCHC RBC AUTO-ENTMCNC: 33.9 G/DL (ref 31.5–36.5)
MCV RBC AUTO: 86 FL (ref 78–100)
PLATELET # BLD AUTO: 271 10E3/UL (ref 150–450)
RBC # BLD AUTO: 4.35 10E6/UL (ref 3.8–5.2)
TIBC SERPL-MCNC: 373 UG/DL (ref 240–430)
WBC # BLD AUTO: 6.3 10E3/UL (ref 4–11)

## 2021-08-31 PROCEDURE — 83550 IRON BINDING TEST: CPT | Performed by: NURSE PRACTITIONER

## 2021-08-31 PROCEDURE — 85027 COMPLETE CBC AUTOMATED: CPT | Performed by: NURSE PRACTITIONER

## 2021-08-31 PROCEDURE — 99214 OFFICE O/P EST MOD 30 MIN: CPT | Mod: 25 | Performed by: NURSE PRACTITIONER

## 2021-08-31 PROCEDURE — 82947 ASSAY GLUCOSE BLOOD QUANT: CPT | Performed by: NURSE PRACTITIONER

## 2021-08-31 PROCEDURE — 36415 COLL VENOUS BLD VENIPUNCTURE: CPT | Performed by: NURSE PRACTITIONER

## 2021-08-31 PROCEDURE — 99395 PREV VISIT EST AGE 18-39: CPT | Performed by: NURSE PRACTITIONER

## 2021-08-31 PROCEDURE — 82728 ASSAY OF FERRITIN: CPT | Performed by: NURSE PRACTITIONER

## 2021-08-31 RX ORDER — ALBUTEROL SULFATE 90 UG/1
2-4 AEROSOL, METERED RESPIRATORY (INHALATION) EVERY 4 HOURS PRN
Qty: 8 G | Refills: 4 | Status: SHIPPED | OUTPATIENT
Start: 2021-08-31

## 2021-08-31 ASSESSMENT — ENCOUNTER SYMPTOMS
ABDOMINAL PAIN: 0
HEMATOCHEZIA: 0
FREQUENCY: 0
JOINT SWELLING: 0
NERVOUS/ANXIOUS: 0
COUGH: 0
DIARRHEA: 0
BREAST MASS: 0
NAUSEA: 0
DIZZINESS: 0
HEARTBURN: 0
HEADACHES: 0
PALPITATIONS: 0
DYSURIA: 0
HEMATURIA: 0
PARESTHESIAS: 0
CONSTIPATION: 0
MYALGIAS: 0
SORE THROAT: 0
EYE PAIN: 0
ARTHRALGIAS: 0
FEVER: 0
SHORTNESS OF BREATH: 0
CHILLS: 0
WEAKNESS: 0

## 2021-08-31 ASSESSMENT — MIFFLIN-ST. JEOR: SCORE: 1306.65

## 2021-08-31 ASSESSMENT — PAIN SCALES - GENERAL: PAINLEVEL: NO PAIN (0)

## 2021-08-31 NOTE — LETTER
[address] [address]  [phone]          September 13, 2021    Cuca Jung                                                                                                                     [id number] [facility] [place] [facility] [address]            To whom it may concern,    Re:  Cuca Thurman was seen in clinic for a physical on 8/31/21 and has no restrictions for work.        Sincerely,         Natty ROSS, CNP

## 2021-08-31 NOTE — PROGRESS NOTES
SUBJECTIVE:   CC: Cuca Jung is an 35 year old woman who presents for preventive health visit.       Patient has been advised of split billing requirements and indicates understanding: Yes  Healthy Habits:     Getting at least 3 servings of Calcium per day:  Yes    Bi-annual eye exam:  Yes    Dental care twice a year:  NO    Sleep apnea or symptoms of sleep apnea:  None    Diet:  Vegetarian/vegan    Frequency of exercise:  2-3 days/week    Duration of exercise:  45-60 minutes    Taking medications regularly:  Yes    Medication side effects:  None    PHQ-2 Total Score: 0    Additional concerns today:  Yes      Asthma Follow-Up    Was ACT completed today?    Yes    ACT Total Scores 3/15/2019   ACT TOTAL SCORE (Goal Greater than or Equal to 20) 23   In the past 12 months, how many times did you visit the emergency room for your asthma without being admitted to the hospital? 1   In the past 12 months, how many times were you hospitalized overnight because of your asthma? 0          How many days per week do you miss taking your asthma controller medication?  0    Please describe any recent triggers for your asthma: smoke, pollens, humidity and exercise or sports    Have you had any Emergency Room Visits, Urgent Care Visits, or Hospital Admissions since your last office visit?  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   No unexplained hoarseness  No loss of appetite  No unexplained weight loss  No productive or prolonged cough  No bloody sputum  Np persistent fever over 100 F  No night sweats  No hemoptysis  No shortness of breath  No unexplained fatigue or weakness  No chest pain  No recurrent pneumonia  No unprotected exposure to a known TB patient    Today's PHQ-2 Score:   PHQ-2 ( 1999 [employer]) 11/2/2020   Q1: Little interest or pleasure in doing things 0   Q2: Feeling down, depressed or hopeless 0   PHQ-2 Score 0       Abuse: Current or Past (Physical,  Sexual or Emotional) - No  Do you feel safe in your environment? Yes    Have you ever done Advance Care Planning? (For example, a Health Directive, POLST, or a discussion with a medical provider or your loved ones about your wishes): No, advance care planning information given to patient to review.  Patient declined advance care planning discussion at this time.    Social History     Tobacco Use     Smoking status: Never Smoker     Smokeless tobacco: Never Used   Substance Use Topics     Alcohol use: Yes     Alcohol/week: 0.0 standard drinks     Comment: once per month     If you drink alcohol do you typically have >3 drinks per day or >7 drinks per week? No      Reviewed orders with patient.  Reviewed health maintenance and updated orders accordingly - Yes  Labs reviewed in EPIC  BP Readings from Last 3 Encounters:   08/31/21 108/73   03/18/21 114/75   01/06/21 102/61    Wt Readings from Last 3 Encounters:   08/31/21 61.9 kg (136 lb 6.4 oz)   03/18/21 59.4 kg (131 lb)   01/06/21 59.8 kg (131 lb 12.8 oz)                  Patient Active Problem List   Diagnosis     Encounter for surveillance of other contraceptive     Family history of malignant neoplasm of breast     CARDIOVASCULAR SCREENING; LDL GOAL LESS THAN 160     Mild intermittent asthma, uncomplicated     Dysplasia of cervix, low grade (MERRY 1)     Skin cancer (melanoma) (H)     S/P cervical disc replacement     Iron deficiency     Vegan diet     Past Surgical History:   Procedure Laterality Date     ORTHOPEDIC SURGERY       PE TUBES  age 2     REPLACE DISK CERVICAL ANTERIOR N/A 4/9/2019    Procedure: 1.  C5-C6 anterior cervical diskectomy and disk replacement using a Mobi-C device 5 x 15 mm.  2.  Fluoroscopy.  3.  Microscopy.  ;  Surgeon: Aleksandar Culp MD;  Location: [facility] OR     skin cancer Left 06/2018    maligant melenoma     TONSILLECTOMY  age 23     ZZHC CREATE EARDRUM OPENING,GEN ANESTH  1991    P.E. Tubes       Social History     Tobacco Use     Smoking  status: Never Smoker     Smokeless tobacco: Never Used   Substance Use Topics     Alcohol use: Yes     Alcohol/week: 0.0 standard drinks     Comment: once per month     Family History   Problem Relation Age of Onset     Breast Cancer Mother 40        Mastectomy in Early 40s     Hypertension Mother      Cerebrovascular Disease Mother         TIA     Blood Disease Mother         high platelets      Cancer Mother      Thyroid Disease Mother      Hypertension Father      Lipids Father      Hypertension Paternal Grandfather      Hypertension Paternal Grandmother      Neurologic Disorder Paternal Grandmother         Parkinson's     Thyroid Disease Maternal Uncle         thyroid cancer     Neurologic Disorder Other         MS     Multiple Sclerosis Paternal Aunt      Rheumatoid Arthritis Cousin      Lupus Cousin      Hashimoto's thyroiditis Cousin      Graves' disease Paternal Aunt      Diabetes No family hx of      Glaucoma No family hx of      Macular Degeneration No family hx of            Breast Cancer Screening:  Any new diagnosis of family breast, ovarian, or bowel cancer? No    FHS-7: No flowsheet data found.  click delete button to remove this line now  Patient under 40 years of age: Routine Mammogram Screening not recommended.   Pertinent mammograms are reviewed under the imaging tab.    History of abnormal Pap smear:   Last 3 Pap and HPV Results:   PAP / HPV Latest Ref Rng & Units 1/6/2021 12/2/2019 11/7/2018   PAP (Historical) - NIL NIL ASC-US(A)   HPV16 NEG:Negative Negative Negative Negative   HPV18 NEG:Negative Negative Negative Negative   HRHPV NEG:Negative Positive(A) Positive(A) Positive(A)     Reviewed and updated as needed this visit by clinical staff                 Reviewed and updated as needed this visit by Provider                Past Medical History:   Diagnosis Date     ASCUS with positive high risk HPV cervical 1/15/09, 08/25/2017, 11/07/18    type unknown; not 16/18. 11/07/18: See problem list.       Borderline high cholesterol      Cervical high risk HPV (human papillomavirus) test positive 01/06/2021 01/6/21     History of colposcopy 7/19/2006, 06/10/2008    2006 - MERRY 1, 2008 - ECC: neg     Mild intermittent asthma, uncomplicated     exercise or smoke brings it on     Papanicolaou smear of cervix with low grade squamous intraepithelial lesion (LGSIL) 7/12/2006, 05/13/2008      Past Surgical History:   Procedure Laterality Date     ORTHOPEDIC SURGERY       PE TUBES  age 2     REPLACE DISK CERVICAL ANTERIOR N/A 4/9/2019    Procedure: 1.  C5-C6 anterior cervical diskectomy and disk replacement using a Mobi-C device 5 x 15 mm.  2.  Fluoroscopy.  3.  Microscopy.  ;  Surgeon: Aleksandar Culp MD;  Location: [facility] OR     skin cancer Left 06/2018    maligant melenoma     TONSILLECTOMY  age 23     ZZHC CREATE EARDRUM OPENING,GEN ANESTH  1991    P.E. Tubes       Review of Systems   Constitutional: Negative for chills and fever.   HENT: Negative for congestion, ear pain, hearing loss and sore throat.    Eyes: Negative for pain and visual disturbance.   Respiratory: Negative for cough and shortness of breath.    Cardiovascular: Negative for chest pain, palpitations and peripheral edema.   Gastrointestinal: Negative for abdominal pain, constipation, diarrhea, heartburn, hematochezia and nausea.   Breasts:  Positive for tenderness. Negative for breast mass and discharge.   Genitourinary: Negative for dysuria, frequency, genital sores, hematuria, pelvic pain, urgency, vaginal bleeding and vaginal discharge.   Musculoskeletal: Negative for arthralgias, joint swelling and myalgias.   Skin: Negative for rash.   Neurological: Negative for dizziness, weakness, headaches and paresthesias.   Psychiatric/Behavioral: Negative for mood changes. The patient is not nervous/anxious.      CONSTITUTIONAL: NEGATIVE for fever, chills, change in weight  INTEGUMENTARU/SKIN: NEGATIVE for worrisome rashes, moles or lesions  EYES: NEGATIVE for  vision changes or irritation  ENT: NEGATIVE for ear, mouth and throat problems  RESP: NEGATIVE for significant cough or SOB  BREAST: NEGATIVE for masses, tenderness or discharge  CV: NEGATIVE for chest pain, palpitations or peripheral edema  GI: NEGATIVE for nausea, abdominal pain, heartburn, or change in bowel habits  : NEGATIVE for unusual urinary or vaginal symptoms. Periods are regular.  MUSCULOSKELETAL: NEGATIVE for significant arthralgias or myalgia  NEURO: NEGATIVE for weakness, dizziness or paresthesias  ENDOCRINE: NEGATIVE for temperature intolerance, skin/hair changes  PSYCHIATRIC: NEGATIVE for changes in mood or affect     OBJECTIVE:   There were no vitals taken for this visit.  Physical Exam  GENERAL: healthy, alert and no distress  EYES: Eyes grossly normal to inspection, PERRL and conjunctivae and sclerae normal  HENT: ear canals and TM's normal, nose and mouth without ulcers or lesions  NECK: no adenopathy, no asymmetry, masses, or scars and thyroid normal to palpation  RESP: lungs clear to auscultation - no rales, rhonchi or wheezes  BREAST: normal without masses, tenderness or nipple discharge and no palpable axillary masses or adenopathy  CV: regular rate and rhythm, normal S1 S2, no S3 or S4, no murmur, click or rub, no peripheral edema and peripheral pulses strong  ABDOMEN: soft, nontender, no hepatosplenomegaly, no masses and bowel sounds normal   (female): deferred  MS: no gross musculoskeletal defects noted, no edema  SKIN: no suspicious lesions or rashes  NEURO: Normal strength and tone, mentation intact and speech normal  PSYCH: mentation appears normal, affect normal/bright  LYMPH: no cervical, supraclavicular, axillary, or inguinal adenopathy    Diagnostic Test Results:  Labs reviewed in Epic  No results found for this or any previous visit (from the past 24 hour(s)).    ASSESSMENT/PLAN:   (Z00.00) Routine general medical examination at a health care facility  (primary encounter  "diagnosis)  Comment:   Plan: REVIEW OF HEALTH MAINTENANCE PROTOCOL ORDERS,         Glucose            (J45.20) Mild intermittent asthma without complication  Comment: Stable, refille albuterol fo rprn use  Plan: albuterol (PROAIR HFA/PROVENTIL HFA/VENTOLIN         HFA) 108 (90 Base) MCG/ACT inhaler            (E61.1) Iron deficiency  Comment: checking labs, treat as indicated, reviewed high iron containing foods  Plan: CBC with platelets, Ferritin, Iron and iron         binding capacity            (N87.0) Dysplasia of cervix, low grade (MERRY 1)  Comment: due for colposcopy which has been ordered- she will call to schedule this  Plan: Schedule colposcopy    Patient has been advised of split billing requirements and indicates understanding: Yes  COUNSELING:  Reviewed preventive health counseling, as reflected in patient instructions    Estimated body mass index is 22.49 kg/m  as calculated from the following:    Height as of 1/6/21: 1.626 m (5' 4\").    Weight as of 3/18/21: 59.4 kg (131 lb).        She reports that she has never smoked. She has never used smokeless tobacco.      Counseling Resources:  ATP IV Guidelines  Pooled Cohorts Equation Calculator  Breast Cancer Risk Calculator  BRCA-Related Cancer Risk Assessment: FHS-7 Tool  FRAX Risk Assessment  ICSI Preventive Guidelines  Dietary Guidelines for Americans, 2010  USDA's MyPlate  ASA Prophylaxis  Lung CA Screening    Natty Daly, CODY [facility]    "

## 2021-09-02 ASSESSMENT — ASTHMA QUESTIONNAIRES: ACT_TOTALSCORE: 24

## 2021-09-05 ENCOUNTER — HEALTH MAINTENANCE LETTER (OUTPATIENT)
Age: 36
End: 2021-09-05

## 2021-09-08 ENCOUNTER — LAB (OUTPATIENT)
Dept: LAB | Facility: CLINIC | Age: 36
End: 2021-09-08
Payer: COMMERCIAL

## 2021-09-08 DIAGNOSIS — Z11.1 SCREENING EXAMINATION FOR PULMONARY TUBERCULOSIS: ICD-10-CM

## 2021-09-08 PROCEDURE — 86481 TB AG RESPONSE T-CELL SUSP: CPT

## 2021-09-08 PROCEDURE — 36415 COLL VENOUS BLD VENIPUNCTURE: CPT

## 2021-09-10 LAB
GAMMA INTERFERON BACKGROUND BLD IA-ACNC: 0.05 IU/ML
M TB IFN-G BLD-IMP: NEGATIVE
M TB IFN-G CD4+ BCKGRND COR BLD-ACNC: 9.95 IU/ML
MITOGEN IGNF BCKGRD COR BLD-ACNC: 0.04 IU/ML
MITOGEN IGNF BCKGRD COR BLD-ACNC: 0.09 IU/ML
QUANTIFERON MITOGEN: 10 IU/ML
QUANTIFERON NIL TUBE: 0.05 IU/ML
QUANTIFERON TB1 TUBE: 0.09 IU/ML
QUANTIFERON TB2 TUBE: 0.14

## 2021-09-12 ENCOUNTER — MYC MEDICAL ADVICE (OUTPATIENT)
Dept: FAMILY MEDICINE | Facility: CLINIC | Age: 36
End: 2021-09-12

## 2021-09-13 NOTE — TELEPHONE ENCOUNTER
Letter written at patient request. [facility] mail letter to patient to give to her employer.    Natty ROSS, CNP

## 2021-09-13 NOTE — TELEPHONE ENCOUNTER
Printed letter and mailing to patient's home address. Sent patient a My Chart message.  Ayala Glover [facility]  2nd Floor  Primary Care

## 2021-09-13 NOTE — TELEPHONE ENCOUNTER
Routing to provider to review and advise    Patient was last seen with you on 8/31     Umu Walker RN

## 2022-10-23 ENCOUNTER — HEALTH MAINTENANCE LETTER (OUTPATIENT)
Age: 37
End: 2022-10-23

## 2023-01-31 NOTE — NURSING NOTE
"Chief Complaint   Patient presents with     URI       Initial /76 (BP Location: Left arm, Patient Position: Chair, Cuff Size: Adult Regular)  Pulse 85  Temp 98.8  F (37.1  C) (Oral)  Wt 123 lb (55.8 kg)  SpO2 99%  BMI 21.11 kg/m2 Estimated body mass index is 21.11 kg/(m^2) as calculated from the following:    Height as of 8/11/16: 5' 4\" (1.626 m).    Weight as of this encounter: 123 lb (55.8 kg).  Medication Reconciliation: complete     Roxana Dangelo MA    " None

## 2023-11-05 ENCOUNTER — HEALTH MAINTENANCE LETTER (OUTPATIENT)
Age: 38
End: 2023-11-05

## (undated) DEVICE — SU ETHILON 3-0 PS-2 18" 1669H

## (undated) DEVICE — PACK SMALL SPINE RIDGES

## (undated) DEVICE — SPONGE SURGIFOAM 01GM POWDER 1978

## (undated) DEVICE — DRAPE C-ARMOR 5 SIDED 5523

## (undated) DEVICE — GLOVE PROTEXIS W/NEU-THERA 8.0  2D73TE80

## (undated) DEVICE — DRSG TEGADERM 4X4 3/4" 1626W

## (undated) DEVICE — BAG CLEAR TRASH 1.3M 39X33" P4040C

## (undated) DEVICE — SYR 20ML LL W/O NDL 302830

## (undated) DEVICE — LINEN POUCH DBL 5427

## (undated) DEVICE — SU DERMABOND MINI DHVM12

## (undated) DEVICE — SU VICRYL 3-0 CT-2 27" UND J232H

## (undated) DEVICE — SPINAL CORD MONITORING SUPPLIES

## (undated) DEVICE — DRAPE IOBAN INCISE 23X17" 6650EZ

## (undated) DEVICE — GLOVE PROTEXIS POWDER FREE 8.5 ORTHOPEDIC 2D73ET85

## (undated) DEVICE — LINEN ORTHO ACL PACK 5447

## (undated) DEVICE — SPONGE SURGIFOAM 12 1972

## (undated) DEVICE — PIN DISTRACTION ANCHOR FOR SCR 12MM MDS9091212 909-12

## (undated) DEVICE — CATH IV ANGIO INTRO 12GA 382277

## (undated) DEVICE — GLOVE PROTEXIS BLUE W/NEU-THERA 8.5  2D73EB85

## (undated) DEVICE — DRAIN JACKSON PRATT 10FR ROUND SU130-1321

## (undated) DEVICE — DRSG STERI STRIP 1/2X4" R1547

## (undated) DEVICE — SYR 10ML LL W/O NDL 302995

## (undated) DEVICE — LINEN FULL SHEET 5511

## (undated) DEVICE — SUCTION MANIFOLD NEPTUNE 2 SYS 4 PORT 0702-020-000

## (undated) DEVICE — DRAPE STERI TOWEL LG 1010

## (undated) DEVICE — SOL NACL 0.9% IRRIG 1000ML BOTTLE 2F7124

## (undated) DEVICE — SPONGE KITTNER 30-101

## (undated) DEVICE — SPONGE COTTONOID 1/2X1/2" 80-1400

## (undated) DEVICE — SOL WATER IRRIG 1000ML BOTTLE 2F7114

## (undated) DEVICE — LINEN HALF SHEET 5512

## (undated) DEVICE — MIDAS REX DISSECTING TOOL  14MH30

## (undated) DEVICE — SUCTION FRAZIER 12FR W/OBTURATOR 33120

## (undated) DEVICE — TAPE DURAPORE 3" SILK 1538-3

## (undated) DEVICE — DRAPE MICROSCOPE OPMI ZEISS 48X118" 306071-0000-000

## (undated) DEVICE — DRAPE X-RAY TUBE 00-901169-01-OEC

## (undated) DEVICE — ESU ELEC BLADE 2.75" COATED/INSULATED E1455

## (undated) DEVICE — IMM COLLAR CERVICAL MED UNIVERSAL 3X24" 79-83500

## (undated) DEVICE — DRSG ADAPTIC 3X3" 6112

## (undated) DEVICE — SOL NACL 0.9% 20ML VIAL

## (undated) DEVICE — DRAIN JACKSON PRATT RESERVOIR 100ML SU130-1305

## (undated) DEVICE — SU MONOCRYL 4-0 PS-2 27" UND Y426H

## (undated) DEVICE — NDL 25GA 1.5" 305127

## (undated) DEVICE — ESU GROUND PAD ADULT W/CORD E7507

## (undated) DEVICE — Device

## (undated) DEVICE — NDL SPINAL 25GA 3.5" QUINCKE 405180

## (undated) RX ORDER — FENTANYL CITRATE 50 UG/ML
INJECTION, SOLUTION INTRAMUSCULAR; INTRAVENOUS
Status: DISPENSED
Start: 2019-04-10

## (undated) RX ORDER — ACETAMINOPHEN 500 MG
TABLET ORAL
Status: DISPENSED
Start: 2019-04-09

## (undated) RX ORDER — PROPOFOL 10 MG/ML
INJECTION, EMULSION INTRAVENOUS
Status: DISPENSED
Start: 2019-04-09

## (undated) RX ORDER — FENTANYL CITRATE 50 UG/ML
INJECTION, SOLUTION INTRAMUSCULAR; INTRAVENOUS
Status: DISPENSED
Start: 2019-04-09

## (undated) RX ORDER — HYDROXYZINE HYDROCHLORIDE 25 MG/1
TABLET, FILM COATED ORAL
Status: DISPENSED
Start: 2019-04-10

## (undated) RX ORDER — BUPIVACAINE HYDROCHLORIDE AND EPINEPHRINE 5; 5 MG/ML; UG/ML
INJECTION, SOLUTION EPIDURAL; INTRACAUDAL; PERINEURAL
Status: DISPENSED
Start: 2019-04-09

## (undated) RX ORDER — CEFAZOLIN SODIUM 1 G/3ML
INJECTION, POWDER, FOR SOLUTION INTRAMUSCULAR; INTRAVENOUS
Status: DISPENSED
Start: 2019-04-09

## (undated) RX ORDER — CEFAZOLIN SODIUM 2 G/100ML
INJECTION, SOLUTION INTRAVENOUS
Status: DISPENSED
Start: 2019-04-09